# Patient Record
Sex: FEMALE | Race: WHITE | NOT HISPANIC OR LATINO | Employment: UNEMPLOYED | URBAN - METROPOLITAN AREA
[De-identification: names, ages, dates, MRNs, and addresses within clinical notes are randomized per-mention and may not be internally consistent; named-entity substitution may affect disease eponyms.]

---

## 2017-07-25 PROBLEM — D72.828 OTHER ELEVATED WHITE BLOOD CELL COUNT: Chronic | Status: ACTIVE | Noted: 2017-07-25

## 2018-10-26 ENCOUNTER — HOSPITAL ENCOUNTER (INPATIENT)
Facility: HOSPITAL | Age: 68
LOS: 18 days | Discharge: HOSPICE | DRG: 166 | End: 2018-11-14
Attending: EMERGENCY MEDICINE | Admitting: INTERNAL MEDICINE
Payer: COMMERCIAL

## 2018-10-26 DIAGNOSIS — D72.828 OTHER ELEVATED WHITE BLOOD CELL COUNT: Chronic | ICD-10-CM

## 2018-10-26 DIAGNOSIS — I10 ESSENTIAL HYPERTENSION: ICD-10-CM

## 2018-10-26 DIAGNOSIS — J90 PLEURAL EFFUSION: ICD-10-CM

## 2018-10-26 DIAGNOSIS — Z72.0 TOBACCO ABUSE: ICD-10-CM

## 2018-10-26 DIAGNOSIS — R06.2 WHEEZE: ICD-10-CM

## 2018-10-26 DIAGNOSIS — F41.1 GENERALIZED ANXIETY DISORDER: ICD-10-CM

## 2018-10-26 DIAGNOSIS — J18.9 PNEUMONIA OF LEFT LOWER LOBE DUE TO INFECTIOUS ORGANISM: Primary | ICD-10-CM

## 2018-10-26 DIAGNOSIS — E03.8 HYPOTHYROIDISM DUE TO HASHIMOTO'S THYROIDITIS: ICD-10-CM

## 2018-10-26 DIAGNOSIS — R93.89 ABNORMAL CXR: ICD-10-CM

## 2018-10-26 DIAGNOSIS — R06.02 SOB (SHORTNESS OF BREATH): ICD-10-CM

## 2018-10-26 DIAGNOSIS — E06.3 HYPOTHYROIDISM DUE TO HASHIMOTO'S THYROIDITIS: ICD-10-CM

## 2018-10-26 DIAGNOSIS — J96.01 ACUTE RESPIRATORY FAILURE WITH HYPOXIA: ICD-10-CM

## 2018-10-26 DIAGNOSIS — R63.0 ANOREXIA: ICD-10-CM

## 2018-10-26 LAB
BASOPHILS # BLD AUTO: 0 K/UL
BASOPHILS NFR BLD: 0 %
DIFFERENTIAL METHOD: ABNORMAL
EOSINOPHIL # BLD AUTO: 0 K/UL
EOSINOPHIL NFR BLD: 0.3 %
ERYTHROCYTE [DISTWIDTH] IN BLOOD BY AUTOMATED COUNT: 13.8 %
HCT VFR BLD AUTO: 51.7 %
HGB BLD-MCNC: 16.9 G/DL
LYMPHOCYTES # BLD AUTO: 2.1 K/UL
LYMPHOCYTES NFR BLD: 15 %
MCH RBC QN AUTO: 29.6 PG
MCHC RBC AUTO-ENTMCNC: 32.8 G/DL
MCV RBC AUTO: 90 FL
MONOCYTES # BLD AUTO: 0.6 K/UL
MONOCYTES NFR BLD: 4.4 %
NEUTROPHILS # BLD AUTO: 11.5 K/UL
NEUTROPHILS NFR BLD: 80.3 %
PLATELET # BLD AUTO: 347 K/UL
PMV BLD AUTO: 9 FL
RBC # BLD AUTO: 5.73 M/UL
WBC # BLD AUTO: 14.3 K/UL

## 2018-10-26 PROCEDURE — 25000242 PHARM REV CODE 250 ALT 637 W/ HCPCS: Performed by: EMERGENCY MEDICINE

## 2018-10-26 PROCEDURE — 27000221 HC OXYGEN, UP TO 24 HOURS

## 2018-10-26 PROCEDURE — 80053 COMPREHEN METABOLIC PANEL: CPT

## 2018-10-26 PROCEDURE — 96375 TX/PRO/DX INJ NEW DRUG ADDON: CPT

## 2018-10-26 PROCEDURE — 87040 BLOOD CULTURE FOR BACTERIA: CPT

## 2018-10-26 PROCEDURE — 93005 ELECTROCARDIOGRAM TRACING: CPT

## 2018-10-26 PROCEDURE — 85025 COMPLETE CBC W/AUTO DIFF WBC: CPT

## 2018-10-26 PROCEDURE — 93010 ELECTROCARDIOGRAM REPORT: CPT | Mod: ,,, | Performed by: INTERNAL MEDICINE

## 2018-10-26 PROCEDURE — 94640 AIRWAY INHALATION TREATMENT: CPT

## 2018-10-26 PROCEDURE — 36415 COLL VENOUS BLD VENIPUNCTURE: CPT

## 2018-10-26 PROCEDURE — 96365 THER/PROPH/DIAG IV INF INIT: CPT

## 2018-10-26 PROCEDURE — 99285 EMERGENCY DEPT VISIT HI MDM: CPT | Mod: 25

## 2018-10-26 PROCEDURE — 83605 ASSAY OF LACTIC ACID: CPT

## 2018-10-26 RX ORDER — IPRATROPIUM BROMIDE AND ALBUTEROL SULFATE 2.5; .5 MG/3ML; MG/3ML
3 SOLUTION RESPIRATORY (INHALATION)
Status: COMPLETED | OUTPATIENT
Start: 2018-10-26 | End: 2018-10-26

## 2018-10-26 RX ADMIN — IPRATROPIUM BROMIDE AND ALBUTEROL SULFATE 3 ML: .5; 3 SOLUTION RESPIRATORY (INHALATION) at 11:10

## 2018-10-27 PROBLEM — E03.8 HYPOTHYROIDISM DUE TO HASHIMOTO'S THYROIDITIS: Status: ACTIVE | Noted: 2018-10-27

## 2018-10-27 PROBLEM — F41.1 GENERALIZED ANXIETY DISORDER: Status: ACTIVE | Noted: 2018-10-27

## 2018-10-27 PROBLEM — I10 ESSENTIAL HYPERTENSION: Status: ACTIVE | Noted: 2018-10-27

## 2018-10-27 PROBLEM — E06.3 HYPOTHYROIDISM DUE TO HASHIMOTO'S THYROIDITIS: Status: ACTIVE | Noted: 2018-10-27

## 2018-10-27 PROBLEM — Z72.0 TOBACCO ABUSE: Status: ACTIVE | Noted: 2018-10-27

## 2018-10-27 PROBLEM — J96.01 ACUTE RESPIRATORY FAILURE WITH HYPOXIA: Status: ACTIVE | Noted: 2018-10-27

## 2018-10-27 PROBLEM — J18.9 PNEUMONIA OF LEFT LOWER LOBE DUE TO INFECTIOUS ORGANISM: Status: ACTIVE | Noted: 2018-10-27

## 2018-10-27 LAB
ALBUMIN SERPL BCP-MCNC: 3.1 G/DL
ALBUMIN SERPL BCP-MCNC: 3.8 G/DL
ALP SERPL-CCNC: 73 U/L
ALP SERPL-CCNC: 94 U/L
ALT SERPL W/O P-5'-P-CCNC: 23 U/L
ALT SERPL W/O P-5'-P-CCNC: 30 U/L
ANION GAP SERPL CALC-SCNC: 11 MMOL/L
ANION GAP SERPL CALC-SCNC: 13 MMOL/L
AST SERPL-CCNC: 23 U/L
AST SERPL-CCNC: 30 U/L
BACTERIA #/AREA URNS HPF: ABNORMAL /HPF
BASOPHILS # BLD AUTO: 0 K/UL
BASOPHILS NFR BLD: 0.2 %
BILIRUB SERPL-MCNC: 0.3 MG/DL
BILIRUB SERPL-MCNC: 0.5 MG/DL
BILIRUB UR QL STRIP: NEGATIVE
BUN SERPL-MCNC: 19 MG/DL
BUN SERPL-MCNC: 19 MG/DL
CALCIUM SERPL-MCNC: 10.2 MG/DL
CALCIUM SERPL-MCNC: 9.2 MG/DL
CHLORIDE SERPL-SCNC: 100 MMOL/L
CHLORIDE SERPL-SCNC: 96 MMOL/L
CLARITY UR: ABNORMAL
CO2 SERPL-SCNC: 25 MMOL/L
CO2 SERPL-SCNC: 30 MMOL/L
COLOR UR: YELLOW
CREAT SERPL-MCNC: 0.9 MG/DL
CREAT SERPL-MCNC: 1 MG/DL
DIFFERENTIAL METHOD: ABNORMAL
EOSINOPHIL # BLD AUTO: 0.1 K/UL
EOSINOPHIL NFR BLD: 0.6 %
ERYTHROCYTE [DISTWIDTH] IN BLOOD BY AUTOMATED COUNT: 13.6 %
EST. GFR  (AFRICAN AMERICAN): >60 ML/MIN/1.73 M^2
EST. GFR  (AFRICAN AMERICAN): >60 ML/MIN/1.73 M^2
EST. GFR  (NON AFRICAN AMERICAN): 57 ML/MIN/1.73 M^2
EST. GFR  (NON AFRICAN AMERICAN): >60 ML/MIN/1.73 M^2
ESTIMATED AVG GLUCOSE: 111 MG/DL
GLUCOSE SERPL-MCNC: 126 MG/DL
GLUCOSE SERPL-MCNC: 163 MG/DL
GLUCOSE UR QL STRIP: NEGATIVE
HBA1C MFR BLD HPLC: 5.5 %
HCT VFR BLD AUTO: 46 %
HGB BLD-MCNC: 15 G/DL
HGB UR QL STRIP: NEGATIVE
KETONES UR QL STRIP: NEGATIVE
LACTATE SERPL-SCNC: 1.7 MMOL/L
LACTATE SERPL-SCNC: 1.9 MMOL/L
LEUKOCYTE ESTERASE UR QL STRIP: ABNORMAL
LYMPHOCYTES # BLD AUTO: 2.4 K/UL
LYMPHOCYTES NFR BLD: 17.5 %
MAGNESIUM SERPL-MCNC: 1.7 MG/DL
MCH RBC QN AUTO: 29.4 PG
MCHC RBC AUTO-ENTMCNC: 32.5 G/DL
MCV RBC AUTO: 90 FL
MICROSCOPIC COMMENT: ABNORMAL
MONOCYTES # BLD AUTO: 0.9 K/UL
MONOCYTES NFR BLD: 6.5 %
NEUTROPHILS # BLD AUTO: 10.1 K/UL
NEUTROPHILS NFR BLD: 75.2 %
NITRITE UR QL STRIP: NEGATIVE
PH UR STRIP: 6 [PH] (ref 5–8)
PHOSPHATE SERPL-MCNC: 4.2 MG/DL
PLATELET # BLD AUTO: 305 K/UL
PMV BLD AUTO: 9 FL
POTASSIUM SERPL-SCNC: 3.7 MMOL/L
POTASSIUM SERPL-SCNC: 4.2 MMOL/L
PROCALCITONIN SERPL IA-MCNC: 0.03 NG/ML
PROT SERPL-MCNC: 6.3 G/DL
PROT SERPL-MCNC: 7.9 G/DL
PROT UR QL STRIP: NEGATIVE
RBC # BLD AUTO: 5.09 M/UL
RBC #/AREA URNS HPF: 1 /HPF (ref 0–4)
SODIUM SERPL-SCNC: 136 MMOL/L
SODIUM SERPL-SCNC: 139 MMOL/L
SP GR UR STRIP: 1.02 (ref 1–1.03)
SQUAMOUS #/AREA URNS HPF: 22 /HPF
URN SPEC COLLECT METH UR: ABNORMAL
UROBILINOGEN UR STRIP-ACNC: NEGATIVE EU/DL
WBC # BLD AUTO: 13.4 K/UL
WBC #/AREA URNS HPF: >100 /HPF (ref 0–5)

## 2018-10-27 PROCEDURE — S4991 NICOTINE PATCH NONLEGEND: HCPCS | Performed by: NURSE PRACTITIONER

## 2018-10-27 PROCEDURE — 63600175 PHARM REV CODE 636 W HCPCS: Performed by: NURSE PRACTITIONER

## 2018-10-27 PROCEDURE — 87088 URINE BACTERIA CULTURE: CPT

## 2018-10-27 PROCEDURE — 84100 ASSAY OF PHOSPHORUS: CPT

## 2018-10-27 PROCEDURE — 12000002 HC ACUTE/MED SURGE SEMI-PRIVATE ROOM

## 2018-10-27 PROCEDURE — 27000221 HC OXYGEN, UP TO 24 HOURS

## 2018-10-27 PROCEDURE — 36415 COLL VENOUS BLD VENIPUNCTURE: CPT

## 2018-10-27 PROCEDURE — 94640 AIRWAY INHALATION TREATMENT: CPT

## 2018-10-27 PROCEDURE — 25000003 PHARM REV CODE 250: Performed by: NURSE PRACTITIONER

## 2018-10-27 PROCEDURE — 25000242 PHARM REV CODE 250 ALT 637 W/ HCPCS: Performed by: INTERNAL MEDICINE

## 2018-10-27 PROCEDURE — 83735 ASSAY OF MAGNESIUM: CPT

## 2018-10-27 PROCEDURE — 25000003 PHARM REV CODE 250: Performed by: INTERNAL MEDICINE

## 2018-10-27 PROCEDURE — 83605 ASSAY OF LACTIC ACID: CPT

## 2018-10-27 PROCEDURE — 87086 URINE CULTURE/COLONY COUNT: CPT

## 2018-10-27 PROCEDURE — 85025 COMPLETE CBC W/AUTO DIFF WBC: CPT

## 2018-10-27 PROCEDURE — 83036 HEMOGLOBIN GLYCOSYLATED A1C: CPT

## 2018-10-27 PROCEDURE — 63600175 PHARM REV CODE 636 W HCPCS: Performed by: EMERGENCY MEDICINE

## 2018-10-27 PROCEDURE — 84145 PROCALCITONIN (PCT): CPT

## 2018-10-27 PROCEDURE — 80053 COMPREHEN METABOLIC PANEL: CPT

## 2018-10-27 PROCEDURE — 99223 1ST HOSP IP/OBS HIGH 75: CPT | Mod: ,,, | Performed by: INTERNAL MEDICINE

## 2018-10-27 PROCEDURE — 81000 URINALYSIS NONAUTO W/SCOPE: CPT

## 2018-10-27 RX ORDER — IPRATROPIUM BROMIDE AND ALBUTEROL SULFATE 2.5; .5 MG/3ML; MG/3ML
3 SOLUTION RESPIRATORY (INHALATION) EVERY 4 HOURS
Status: DISCONTINUED | OUTPATIENT
Start: 2018-10-27 | End: 2018-10-29

## 2018-10-27 RX ORDER — ENOXAPARIN SODIUM 100 MG/ML
40 INJECTION SUBCUTANEOUS EVERY 24 HOURS
Status: DISCONTINUED | OUTPATIENT
Start: 2018-10-27 | End: 2018-10-29

## 2018-10-27 RX ORDER — CEFTRIAXONE 1 G/50ML
1 INJECTION, SOLUTION INTRAVENOUS
Status: DISCONTINUED | OUTPATIENT
Start: 2018-10-28 | End: 2018-11-01

## 2018-10-27 RX ORDER — LEVOTHYROXINE SODIUM 50 UG/1
50 TABLET ORAL
Status: DISCONTINUED | OUTPATIENT
Start: 2018-10-27 | End: 2018-11-14 | Stop reason: HOSPADM

## 2018-10-27 RX ORDER — SODIUM CHLORIDE 0.9 % (FLUSH) 0.9 %
5 SYRINGE (ML) INJECTION
Status: DISCONTINUED | OUTPATIENT
Start: 2018-10-27 | End: 2018-11-14 | Stop reason: HOSPADM

## 2018-10-27 RX ORDER — IBUPROFEN 200 MG
24 TABLET ORAL
Status: DISCONTINUED | OUTPATIENT
Start: 2018-10-27 | End: 2018-11-14 | Stop reason: HOSPADM

## 2018-10-27 RX ORDER — PROPRANOLOL HYDROCHLORIDE 80 MG/1
80 TABLET ORAL 2 TIMES DAILY
Status: DISCONTINUED | OUTPATIENT
Start: 2018-10-27 | End: 2018-10-29

## 2018-10-27 RX ORDER — ONDANSETRON 2 MG/ML
4 INJECTION INTRAMUSCULAR; INTRAVENOUS EVERY 6 HOURS PRN
Status: DISCONTINUED | OUTPATIENT
Start: 2018-10-27 | End: 2018-11-14 | Stop reason: HOSPADM

## 2018-10-27 RX ORDER — GLUCAGON 1 MG
1 KIT INJECTION
Status: DISCONTINUED | OUTPATIENT
Start: 2018-10-27 | End: 2018-11-14 | Stop reason: HOSPADM

## 2018-10-27 RX ORDER — PANTOPRAZOLE SODIUM 40 MG/1
40 TABLET, DELAYED RELEASE ORAL DAILY
Status: DISCONTINUED | OUTPATIENT
Start: 2018-10-27 | End: 2018-11-14 | Stop reason: HOSPADM

## 2018-10-27 RX ORDER — IBUPROFEN 200 MG
1 TABLET ORAL DAILY
Status: DISCONTINUED | OUTPATIENT
Start: 2018-10-27 | End: 2018-11-14 | Stop reason: HOSPADM

## 2018-10-27 RX ORDER — IBUPROFEN 200 MG
16 TABLET ORAL
Status: DISCONTINUED | OUTPATIENT
Start: 2018-10-27 | End: 2018-11-14 | Stop reason: HOSPADM

## 2018-10-27 RX ORDER — AMITRIPTYLINE HYDROCHLORIDE 25 MG/1
25 TABLET, FILM COATED ORAL NIGHTLY
Status: DISCONTINUED | OUTPATIENT
Start: 2018-10-27 | End: 2018-11-14 | Stop reason: HOSPADM

## 2018-10-27 RX ORDER — SODIUM CHLORIDE 9 MG/ML
INJECTION, SOLUTION INTRAVENOUS CONTINUOUS
Status: DISCONTINUED | OUTPATIENT
Start: 2018-10-27 | End: 2018-10-27

## 2018-10-27 RX ORDER — ESCITALOPRAM OXALATE 10 MG/1
20 TABLET ORAL DAILY
Status: DISCONTINUED | OUTPATIENT
Start: 2018-10-27 | End: 2018-11-14 | Stop reason: HOSPADM

## 2018-10-27 RX ORDER — ACETAMINOPHEN 325 MG/1
650 TABLET ORAL EVERY 4 HOURS PRN
Status: DISCONTINUED | OUTPATIENT
Start: 2018-10-27 | End: 2018-11-14 | Stop reason: HOSPADM

## 2018-10-27 RX ORDER — IPRATROPIUM BROMIDE AND ALBUTEROL SULFATE 2.5; .5 MG/3ML; MG/3ML
3 SOLUTION RESPIRATORY (INHALATION) EVERY 4 HOURS
Status: DISCONTINUED | OUTPATIENT
Start: 2018-10-27 | End: 2018-10-27 | Stop reason: SDUPTHER

## 2018-10-27 RX ADMIN — IPRATROPIUM BROMIDE AND ALBUTEROL SULFATE 3 ML: .5; 3 SOLUTION RESPIRATORY (INHALATION) at 08:10

## 2018-10-27 RX ADMIN — PROPRANOLOL HYDROCHLORIDE 80 MG: 80 TABLET ORAL at 09:10

## 2018-10-27 RX ADMIN — AMITRIPTYLINE HYDROCHLORIDE 25 MG: 25 TABLET, FILM COATED ORAL at 10:10

## 2018-10-27 RX ADMIN — PANTOPRAZOLE SODIUM 40 MG: 40 TABLET, DELAYED RELEASE ORAL at 09:10

## 2018-10-27 RX ADMIN — AZITHROMYCIN MONOHYDRATE 500 MG: 500 INJECTION, POWDER, LYOPHILIZED, FOR SOLUTION INTRAVENOUS at 01:10

## 2018-10-27 RX ADMIN — NICOTINE 1 PATCH: 14 PATCH, EXTENDED RELEASE TRANSDERMAL at 09:10

## 2018-10-27 RX ADMIN — ONDANSETRON HYDROCHLORIDE 4 MG: 2 INJECTION INTRAMUSCULAR; INTRAVENOUS at 11:10

## 2018-10-27 RX ADMIN — IPRATROPIUM BROMIDE AND ALBUTEROL SULFATE 3 ML: .5; 3 SOLUTION RESPIRATORY (INHALATION) at 03:10

## 2018-10-27 RX ADMIN — PROPRANOLOL HYDROCHLORIDE 80 MG: 80 TABLET ORAL at 10:10

## 2018-10-27 RX ADMIN — ACETAMINOPHEN 650 MG: 325 TABLET, FILM COATED ORAL at 04:10

## 2018-10-27 RX ADMIN — IPRATROPIUM BROMIDE AND ALBUTEROL SULFATE 3 ML: .5; 3 SOLUTION RESPIRATORY (INHALATION) at 04:10

## 2018-10-27 RX ADMIN — CEFTRIAXONE 1 G: 1 INJECTION, SOLUTION INTRAVENOUS at 01:10

## 2018-10-27 RX ADMIN — LEVOTHYROXINE SODIUM 50 MCG: 50 TABLET ORAL at 08:10

## 2018-10-27 RX ADMIN — IPRATROPIUM BROMIDE AND ALBUTEROL SULFATE 3 ML: .5; 3 SOLUTION RESPIRATORY (INHALATION) at 12:10

## 2018-10-27 RX ADMIN — ENOXAPARIN SODIUM 40 MG: 100 INJECTION SUBCUTANEOUS at 04:10

## 2018-10-27 RX ADMIN — SODIUM CHLORIDE: 0.9 INJECTION, SOLUTION INTRAVENOUS at 02:10

## 2018-10-27 RX ADMIN — ESCITALOPRAM 20 MG: 10 TABLET, FILM COATED ORAL at 09:10

## 2018-10-27 NOTE — ASSESSMENT & PLAN NOTE
Presented with increased SOB x 3 weeks.  Oxygen saturation noted at 89%, recovered well with supplemental oxygen via NC.  Likely due to pneumonia of LLL.  Pt is a daily smoker.  Likely has COPD, although she is not currently treated with chronic meds.  Continue supplemental oxygen as needed.  Nebulizer treatments   Monitor and treat as clinically indicated.

## 2018-10-27 NOTE — ED PROVIDER NOTES
"Encounter Date: 10/26/2018    SCRIBE #1 NOTE: I, Antonino Wilson, am scribing for, and in the presence of, Dr. Huff.       History     Chief Complaint   Patient presents with    Shortness of Breath     worse x 3 days     Cough     x 3 days     Fatigue     feeling bad since pneumonia shot 1 month ago       Time seen by provider: 10:55 PM on 10/26/2018    Bertha Kelly is a 68 y.o. female who presents to the ED with complaints of difficulty breathing that began approximately one month ago and has been worsening in severity. Per patients spouse, she received a pneumonia shot one month ago and two days later she started getting sick. He states that it is now three weeks later and she is becoming weaker and weaker, began losing her voice two weeks ago, and started coughing up phlegm this week. Per patients daughter-in-law, the patient stays in the bed constantly and gets episodes of "the chills." The patients daughter adds that this week the she started having hallucinations and has been talking all night long. The patient denies any vomiting or pain. She adds that she has been having diarrhea, but contributes it to a hot dog at [employer]. The patient denies any other symptoms at this time. Patient has drug allergies to codeine and penicillins.       The history is provided by the patient, the spouse and a relative.     Review of patient's allergies indicates:   Allergen Reactions    Codeine Hives    Penicillins Hives     Past Medical History:   Diagnosis Date    Hypertension     Thyroid disease      Past Surgical History:   Procedure Laterality Date    EYE SURGERY      childhood     Family History   Problem Relation Age of Onset    Hypertension Father      Social History     Tobacco Use    Smoking status: Current Every Day Smoker     Packs/day: 0.50     Types: Cigarettes    Smokeless tobacco: Never Used   Substance Use Topics    Alcohol use: No     Frequency: Never    Drug use: No     Review of Systems "   Constitutional: Positive for fatigue. Negative for fever.   HENT: Positive for voice change. Negative for sore throat.    Respiratory: Positive for cough and shortness of breath.    Cardiovascular: Negative for chest pain.   Gastrointestinal: Positive for diarrhea. Negative for abdominal pain, nausea and vomiting.   Genitourinary: Negative for dysuria.   Musculoskeletal: Negative for back pain.   Skin: Negative for rash.   Neurological: Positive for weakness.   Hematological: Does not bruise/bleed easily.   Psychiatric/Behavioral: Positive for hallucinations.       Physical Exam     Initial Vitals [10/26/18 2105]   BP Pulse Resp Temp SpO2   (!) 151/72 84 20 97.6 °F (36.4 °C) (!) 93 %      MAP       --         Physical Exam    Nursing note and vitals reviewed.  Constitutional: She appears well-developed and well-nourished. She is not diaphoretic. No distress.   Hoarse voice noted.    HENT:   Head: Normocephalic and atraumatic.   Eyes: EOM are normal. Pupils are equal, round, and reactive to light.   Neck: Normal range of motion. Neck supple.   Cardiovascular: Normal rate, regular rhythm, normal heart sounds and intact distal pulses. Exam reveals no gallop and no friction rub.    No murmur heard.  Pulmonary/Chest: Tachypnea noted. No respiratory distress. She has decreased breath sounds. She has wheezes. She has no rhonchi. She has no rales.   Wheezes noted. Diminished lung sounds.   Abdominal: Soft. Bowel sounds are normal. There is no tenderness. There is no rebound and no guarding.   Musculoskeletal: Normal range of motion.   Neurological: She is alert and oriented to person, place, and time.   Skin: Skin is warm and dry. There is pallor.   Psychiatric: She has a normal mood and affect. Her behavior is normal. Judgment and thought content normal.         ED Course   Procedures  Labs Reviewed   CBC W/ AUTO DIFFERENTIAL - Abnormal; Notable for the following components:       Result Value    WBC 14.30 (*)     RBC  5.73 (*)     Hemoglobin 16.9 (*)     Hematocrit 51.7 (*)     MPV 9.0 (*)     Gran # (ANC) 11.5 (*)     Gran% 80.3 (*)     Lymph% 15.0 (*)     All other components within normal limits   COMPREHENSIVE METABOLIC PANEL - Abnormal; Notable for the following components:    CO2 30 (*)     Glucose 126 (*)     eGFR if non  57 (*)     All other components within normal limits   CULTURE, BLOOD   CULTURE, BLOOD   LACTIC ACID, PLASMA   URINALYSIS, REFLEX TO URINE CULTURE          Imaging Results          X-Ray Chest AP Portable (In process)                  Medical Decision Making:   History:   Old Medical Records: I decided to obtain old medical records.  Initial Assessment:   68-year-old female presented with a chief complaint of shortness of breath and a cough.  Differential Diagnosis:   Initial differential diagnosis included but not limited to Pneumonia, sepsis, COPD, and bronchitis.   Clinical Tests:   Lab Tests: Ordered and Reviewed  Radiological Study: Ordered and Reviewed  Medical Tests: Ordered and Reviewed  ED Management:  The patient was emergently evaluated in the emergency department, her evaluation was significant for an elderly female with abnormal lung sounds. The patient was aggressively treated with multiple respiratory nebulizer treatment, with improvement of symptoms.  The patient's chest x-ray was concerning for a left-sided pneumonia and pleural effusion per my independent interpretation.  The patient's labs were significant for an elevated white blood cell count and normal lactic acid level.  The patient was started on IV antibiotics and  because the patient was hypoxic, I believe she will require further IV antibiotic therapy.  I have discussed the case with the APC on call for the hospitalist service.  She has accepted the patient for admission.            Scribe Attestation:   Scribe #1: I performed the above scribed service and the documentation accurately describes the services I  performed. I attest to the accuracy of the note.           I, Dr. Betito Huff, personally performed the services described in this documentation. All medical record entries made by the scribe were at my direction and in my presence.  I have reviewed the chart and agree that the record reflects my personal performance and is accurate and complete. Betito Huff MD.  6:11 AM 10/27/2018       Clinical Impression:   The primary encounter diagnosis was Pneumonia of left lower lobe due to infectious organism. A diagnosis of SOB (shortness of breath) was also pertinent to this visit.                             Betito Huff MD  10/27/18 0613

## 2018-10-27 NOTE — PLAN OF CARE
10/27/18 0335   Patient Assessment/Suction   Level of Consciousness (AVPU) alert   Respiratory Effort Unlabored   Expansion/Accessory Muscles/Retractions expansion symmetric   All Lung Fields Breath Sounds diminished   Rhythm/Pattern, Respiratory tachypnea   Cough Frequency infrequent   Cough Type nonproductive   PRE-TX-O2-ETCO2   O2 Device (Oxygen Therapy) nasal cannula   Flow (L/min) 2   SpO2 (!) 94 %   Pulse 88   Resp 20   Aerosol Therapy   $ Aerosol Therapy Charges Aerosol Treatment   Respiratory Treatment Status given   SVN/Inhaler Treatment Route mask   Position During Treatment Sitting in bed   Patient Tolerance good   Post-Treatment   Post-treatment Heart Rate (beats/min) 88   Post-treatment Resp Rate (breaths/min) 20   All Fields Breath Sounds unchanged

## 2018-10-27 NOTE — SUBJECTIVE & OBJECTIVE
Past Medical History:   Diagnosis Date    Hypertension     Thyroid disease        Past Surgical History:   Procedure Laterality Date    EYE SURGERY      childhood       Review of patient's allergies indicates:   Allergen Reactions    Codeine Hives    Penicillins Hives       No current facility-administered medications on file prior to encounter.      No current outpatient medications on file prior to encounter.     Family History     Problem Relation (Age of Onset)    Hypertension Father        Tobacco Use    Smoking status: Current Every Day Smoker     Packs/day: 0.50     Types: Cigarettes    Smokeless tobacco: Never Used   Substance and Sexual Activity    Alcohol use: No     Frequency: Never    Drug use: No    Sexual activity: Not on file     Review of Systems   Constitutional: Positive for appetite change, chills, fatigue and unexpected weight change. Negative for fever.   HENT: Positive for congestion and sore throat.    Eyes: Negative for photophobia and visual disturbance.   Respiratory: Positive for cough and shortness of breath. Negative for wheezing.    Cardiovascular: Negative for chest pain and palpitations.   Gastrointestinal: Positive for diarrhea. Negative for abdominal pain, nausea and vomiting.   Endocrine: Negative for polyphagia and polyuria.   Genitourinary: Negative for difficulty urinating and dysuria.   Musculoskeletal: Negative for gait problem and joint swelling.   Skin: Negative for rash and wound.   Neurological: Negative for dizziness and light-headedness.   Hematological: Negative for adenopathy.   Psychiatric/Behavioral: Positive for hallucinations. Negative for agitation.   All other systems reviewed and are negative.    Objective:     Vital Signs (Most Recent):  Temp: 97.6 °F (36.4 °C) (10/26/18 2105)  Pulse: 71 (10/27/18 0131)  Resp: 20 (10/26/18 2322)  BP: (!) 134/59 (10/27/18 0131)  SpO2: 99 % (10/27/18 0131) Vital Signs (24h Range):  Temp:  [97.6 °F (36.4 °C)] 97.6 °F  (36.4 °C)  Pulse:  [71-84] 71  Resp:  [20-22] 20  SpO2:  [89 %-100 %] 99 %  BP: (110-184)/(59-77) 134/59     Weight: 54.4 kg (120 lb)  Body mass index is 21.26 kg/m².    Physical Exam   Constitutional: She is oriented to person, place, and time. She appears well-developed and well-nourished.   HENT:   Head: Normocephalic and atraumatic.   Eyes: Conjunctivae and EOM are normal. Pupils are equal, round, and reactive to light.   Neck: Normal range of motion. Neck supple. No JVD present.   Cardiovascular: Normal rate, regular rhythm, normal heart sounds and intact distal pulses.   Pulmonary/Chest: Tachypnea noted. She has decreased breath sounds in the left lower field.   Abdominal: Soft. Bowel sounds are normal. She exhibits no distension. There is no tenderness.   Genitourinary:   Genitourinary Comments: deferred   Musculoskeletal: Normal range of motion. She exhibits no edema.   Neurological: She is alert and oriented to person, place, and time.   Skin: Skin is warm and dry. Capillary refill takes 2 to 3 seconds.   Psychiatric: She has a normal mood and affect. Her behavior is normal. Judgment and thought content normal.   Vitals reviewed.        CRANIAL NERVES     CN III, IV, VI   Pupils are equal, round, and reactive to light.  Extraocular motions are normal.        Significant Labs:   CBC:   Recent Labs   Lab 10/26/18  2348   WBC 14.30*   HGB 16.9*   HCT 51.7*        CMP:   Recent Labs   Lab 10/26/18  2348      K 4.2   CL 96   CO2 30*   *   BUN 19   CREATININE 1.0   CALCIUM 10.2   PROT 7.9   ALBUMIN 3.8   BILITOT 0.5   ALKPHOS 94   AST 30   ALT 30   ANIONGAP 13   EGFRNONAA 57*     Lactic Acid:   Recent Labs   Lab 10/26/18  2348   LACTATE 1.7       Significant Imaging: CXR: I have reviewed all pertinent results/findings within the past 24 hours and my personal findings are:  LLL infiltrate

## 2018-10-27 NOTE — HPI
Bertha Kelly is a 67 y/o female with a PMHx of HTN and thyroid disease who presented to the ED today with c/o SOB which began 3 weeks ago and has progressively gotten worse.  Pt states that she becomes SOB with activity.  She reports that she received a pneumonia vaccine at her doctor's office about a month ago and began having some chest congestion and cough a few days later.  She is a current smoker of about 1/2 pack daily.  Reports increased fatigue and chills, but denies fever, chest pain, N/V, and dysuria.  Reports weight loss of a few pounds in the past 3 weeks due to poor appetite, but states that prior to this illness, weight was stable and she had a very good appetite.  Pt's CXR reveals a LLL pneumonia and was noted to be mildly hypoxic initially requiring supplemental oxygen.  She will be admitted to the service of hospital medicine for further treatment.

## 2018-10-27 NOTE — PLAN OF CARE
Discharge assessment completed by KARTHIK Nunez...WENDI Lund       10/27/18 1285   Discharge Assessment   Assessment Type Discharge Planning Assessment

## 2018-10-27 NOTE — PROGRESS NOTES
"Dr. Sagastume notified that patient reported that " I have not urinated since I've been here." Patient was encouraged to try to void. Patient used  ml output. Patient ambulated around unit 2 times. Patient bladder scanned 202 ml. Dr. Sagastume notified of scan results. No further orders at this time.   "

## 2018-10-27 NOTE — ASSESSMENT & PLAN NOTE
Presented with increased SOB x 3 weeks.  Oxygen saturation noted at 89%, recovered well with supplemental oxygen via NC.  Likely due to pneumonia of LLL.  Pt is a daily smoker.  Likely has COPD, although she is not currently treated with chronic meds.  Continue supplemental oxygen as needed.  Nebulizer treatments   Monitor and treat as clinically indicated.  Imaging as outpatient to exclude underlying neoplasm.

## 2018-10-27 NOTE — ASSESSMENT & PLAN NOTE
Presents with SOB; LLL pneumonia identified on CXR.    Initiate treatment regimen for CAP, which includes a beta lactam and macrolide.   IV Rocephin and IV azithromycin initiated in ED-continue  Nebulizer treatments

## 2018-10-27 NOTE — H&P
[facility] Hospital Medicine  History & Physical    Patient Name: Bertha Kelly  MRN: [id number]  Admission Date: 10/26/2018  Attending Physician: Vicki Suarez MD   Primary Care Provider: James Colon MD         Patient information was obtained from patient, spouse/SO and ER records.     Subjective:     Principal Problem:Acute respiratory failure with hypoxia    Chief Complaint:   Chief Complaint   Patient presents with    Shortness of Breath     worse x 3 days     Cough     x 3 days     Fatigue     feeling bad since pneumonia shot 1 month ago        HPI: Bertha Kelly is a 67 y/o female with a PMHx of HTN and thyroid disease who presented to the ED today with c/o SOB which began 3 weeks ago and has progressively gotten worse.  Pt states that she becomes SOB with activity.  She reports that she received a pneumonia vaccine at her doctor's office about a month ago and began having some chest congestion and cough a few days later.  She is a current smoker of about 1/2 pack daily.  Reports increased fatigue and chills, but denies fever, chest pain, N/V, and dysuria.  Reports weight loss of a few pounds in the past 3 weeks due to poor appetite, but states that prior to this illness, weight was stable and she had a very good appetite.  Pt's CXR reveals a LLL pneumonia and was noted to be mildly hypoxic initially requiring supplemental oxygen.  She will be admitted to the service of hospital medicine for further treatment.    Past Medical History:   Diagnosis Date    Hypertension     Thyroid disease        Past Surgical History:   Procedure Laterality Date    EYE SURGERY      childhood       Review of patient's allergies indicates:   Allergen Reactions    Codeine Hives    Penicillins Hives       No current facility-administered medications on file prior to encounter.      No current outpatient medications on file prior to encounter.     Family History     Problem Relation (Age of Onset)     Hypertension Father        Tobacco Use    Smoking status: Current Every Day Smoker     Packs/day: 0.50     Types: Cigarettes    Smokeless tobacco: Never Used   Substance and Sexual Activity    Alcohol use: No     Frequency: Never    Drug use: No    Sexual activity: Not on file     Review of Systems   Constitutional: Positive for appetite change, chills, fatigue and unexpected weight change. Negative for fever.   HENT: Positive for congestion and sore throat.    Eyes: Negative for photophobia and visual disturbance.   Respiratory: Positive for cough and shortness of breath. Negative for wheezing.    Cardiovascular: Negative for chest pain and palpitations.   Gastrointestinal: Positive for diarrhea. Negative for abdominal pain, nausea and vomiting.   Endocrine: Negative for polyphagia and polyuria.   Genitourinary: Negative for difficulty urinating and dysuria.   Musculoskeletal: Negative for gait problem and joint swelling.   Skin: Negative for rash and wound.   Neurological: Negative for dizziness and light-headedness.   Hematological: Negative for adenopathy.   Psychiatric/Behavioral: Positive for hallucinations. Negative for agitation.   All other systems reviewed and are negative.    Objective:     Vital Signs (Most Recent):  Temp: 97.6 °F (36.4 °C) (10/26/18 2105)  Pulse: 71 (10/27/18 0131)  Resp: 20 (10/26/18 2322)  BP: (!) 134/59 (10/27/18 0131)  SpO2: 99 % (10/27/18 0131) Vital Signs (24h Range):  Temp:  [97.6 °F (36.4 °C)] 97.6 °F (36.4 °C)  Pulse:  [71-84] 71  Resp:  [20-22] 20  SpO2:  [89 %-100 %] 99 %  BP: (110-184)/(59-77) 134/59     Weight: 54.4 kg (120 lb)  Body mass index is 21.26 kg/m².    Physical Exam   Constitutional: She is oriented to person, place, and time. She appears well-developed and well-nourished.   HENT:   Head: Normocephalic and atraumatic.   Eyes: Conjunctivae and EOM are normal. Pupils are equal, round, and reactive to light.   Neck: Normal range of motion. Neck supple. No  JVD present.   Cardiovascular: Normal rate, regular rhythm, normal heart sounds and intact distal pulses.   Pulmonary/Chest: Tachypnea noted. She has decreased breath sounds in the left lower field.   Abdominal: Soft. Bowel sounds are normal. She exhibits no distension. There is no tenderness.   Genitourinary:   Genitourinary Comments: deferred   Musculoskeletal: Normal range of motion. She exhibits no edema.   Neurological: She is alert and oriented to person, place, and time.   Skin: Skin is warm and dry. Capillary refill takes 2 to 3 seconds.   Psychiatric: She has a normal mood and affect. Her behavior is normal. Judgment and thought content normal.   Vitals reviewed.        CRANIAL NERVES     CN III, IV, VI   Pupils are equal, round, and reactive to light.  Extraocular motions are normal.        Significant Labs:   CBC:   Recent Labs   Lab 10/26/18  2348   WBC 14.30*   HGB 16.9*   HCT 51.7*        CMP:   Recent Labs   Lab 10/26/18  2348      K 4.2   CL 96   CO2 30*   *   BUN 19   CREATININE 1.0   CALCIUM 10.2   PROT 7.9   ALBUMIN 3.8   BILITOT 0.5   ALKPHOS 94   AST 30   ALT 30   ANIONGAP 13   EGFRNONAA 57*     Lactic Acid:   Recent Labs   Lab 10/26/18  2348   LACTATE 1.7       Significant Imaging: CXR: I have reviewed all pertinent results/findings within the past 24 hours and my personal findings are:  LLL infiltrate    Assessment/Plan:     * Acute respiratory failure with hypoxia    Presented with increased SOB x 3 weeks.  Oxygen saturation noted at 89%, recovered well with supplemental oxygen via NC.  Likely due to pneumonia of LLL.  Pt is a daily smoker.  Likely has COPD, although she is not currently treated with chronic meds.  Continue supplemental oxygen as needed.  Nebulizer treatments   Monitor and treat as clinically indicated.     Pneumonia of left lower lobe due to infectious organism    Presents with SOB; LLL pneumonia identified on CXR.    Initiate treatment regimen for CAP,  which includes a beta lactam and macrolide.   IV Rocephin and IV azithromycin initiated in ED-continue  Nebulizer treatments  Collect sputum sample.  Procalcitonin  Adjust treatment as per clinical response.         Tobacco abuse    Health hazards associated with cigarette smoking were reviewed with patient and cessation was encouraged. Nicotine replacement and counseling options were discussed.  Spent 3 minutes counseling on cessation, patient not ready to quit-- nicotine patch ordered.         Generalized anxiety disorder    Chronic; currently controlled.  Continue home regimen.       Essential hypertension    Chronic; stable.  Continue chronic meds.     Hypothyroidism due to Hashimoto's thyroiditis    Chronic; pt states controlled.  Continue levothyroxine.         VTE Risk Mitigation (From admission, onward)        Ordered     enoxaparin injection 40 mg  Daily      10/27/18 0207     IP VTE HIGH RISK PATIENT  Once      10/27/18 0207     Place EVELYNE hose  Until discontinued      10/27/18 0207     Place sequential compression device  Until discontinued      10/27/18 0207      Time spent seeing patient( greater than 1/2 spent in direct contact) : 50 minutes       CHUNG Morales  Department of Hospital Medicine   [facility]

## 2018-10-27 NOTE — PLAN OF CARE
SSC met with patient at bedside to complete discharge planning assessment.  Patient verified all demographic information on facesheet is correct.  Patient verified PCP is Dr. James Colon.  Patient verified primary health insurance is [employer].  Patient states she has not been admitted within the last 30 days.  Patient is independent and lives with spouse.  Patient do not drive.  Family and friends drive patient.  Patient with no home health or DME.  Patient with no POA or living will.  Patient uses any [facility] in [place], [place].  Patient is not dialysis nor on medication coumadin.  Patient has no financial issues at this time.              Discharge Planning Assessment    Patient Identification  Name: Bertha Kelly  Age: 68 y.o.   Gender: female.  Admitting Diagnosis: Acute respiratory failure with hypoxia  PCP: James Colon MD   PCP Address: [address] [place] [address] [address] [facility]  PCP Phone Number: [phone]     Telephone Contacts  Extended Emergency Contact Information  Primary Emergency Contact: RobinLuciay  Address: [address]  Home Phone: [phone]  Relation: Spouse    Alert/Orientation: yes xs 4  If patient is unable to sign for self, who is handling affairs?: spouse, Nikolas Kelly  Is this person the HPOA? no  Does patient have a living will? no     Arrived from: home  Lives with: spouse  Support System: family  Who do you want involved in your discharge planning? spouse  Are they available to help you at discharge? yes  Prior Level of Functioning: independent  Were you able to care for yourself at home? yes  Who assists with Medications, Meal Prep, Housekeeping, Laundry, Shopping, MD Appts?: self, family    Have you been in the hospital in the last 30 days?: no  If so, were you admitted for the same reason? n/a  If not, why were you in the hospital? n/a    Services received prior to admit:  none  DME used at home:  none    Capacity for self-care: independent  Services patient will need at discharge: none at this time  DME pt will need at discharge: none    Ph[facility] often [facility] [place] [place]  Able to afford meds?: yes    How do you get to your MD appointments / Do you have transportation or depend on someone else:  Family and friends  Are you taking Coumadin at home?:   no If so, who monitors your INR: n/a  Are you on Dialysis?:  no If so where do you attend dialysis: n/a    Community Resources & Referrals Needed: none      Discharge plan 1:  Home with family  Discharge plan 2:

## 2018-10-27 NOTE — SUBJECTIVE & OBJECTIVE
Interval History: Patient seen and examined. No acute events. Feels better this morning.     Review of Systems   Constitutional: Positive for fatigue. Negative for activity change, appetite change and fever.   HENT: Negative.    Eyes: Negative.    Respiratory: Positive for shortness of breath. Negative for chest tightness and wheezing.    Cardiovascular: Negative for chest pain, palpitations and leg swelling.   Gastrointestinal: Negative for abdominal distention, abdominal pain, blood in stool, diarrhea and vomiting.   Genitourinary: Negative for dysuria and hematuria.   Neurological: Negative for headaches.   Hematological: Negative for adenopathy.   Psychiatric/Behavioral: Negative for confusion.     Objective:     Vital Signs (Most Recent):  Temp: 98.3 °F (36.8 °C) (10/27/18 0213)  Pulse: 64 (10/27/18 0932)  Resp: 19 (10/27/18 0855)  BP: (!) 117/59 (10/27/18 0932)  SpO2: 97 % (10/27/18 0855) Vital Signs (24h Range):  Temp:  [97.6 °F (36.4 °C)-98.3 °F (36.8 °C)] 98.3 °F (36.8 °C)  Pulse:  [64-88] 64  Resp:  [18-22] 19  SpO2:  [89 %-100 %] 97 %  BP: (110-184)/(59-77) 117/59     Weight: 54.4 kg (120 lb)  Body mass index is 21.26 kg/m².  No intake or output data in the 24 hours ending 10/27/18 1020   Physical Exam   Constitutional: She is oriented to person, place, and time. She appears well-developed and well-nourished. No distress.   HENT:   Head: Normocephalic and atraumatic.   Eyes: Pupils are equal, round, and reactive to light.   Neck: Neck supple. No thyromegaly present.   Cardiovascular: Normal rate and regular rhythm. Exam reveals no gallop and no friction rub.   No murmur heard.  Pulmonary/Chest: Effort normal and breath sounds normal. No respiratory distress. She has no wheezes.   Abdominal: Soft. Bowel sounds are normal. She exhibits no distension. There is no tenderness. There is no guarding.   Musculoskeletal: Normal range of motion. She exhibits no edema.   Neurological: She is alert and oriented to  person, place, and time.   Skin: Skin is warm and dry. No erythema.   Psychiatric: She has a normal mood and affect.       Significant Labs:   CBC:   Recent Labs   Lab 10/26/18  2348 10/27/18  0502   WBC 14.30* 13.40*   HGB 16.9* 15.0   HCT 51.7* 46.0    305       Significant Imaging: I have reviewed all pertinent imaging results/findings within the past 24 hours.

## 2018-10-27 NOTE — PLAN OF CARE
Problem: Patient Care Overview  Goal: Plan of Care Review  Outcome: Ongoing (interventions implemented as appropriate)  Pt on 2L NC with 97% sats and Q4 duoneb treatments.

## 2018-10-27 NOTE — ASSESSMENT & PLAN NOTE
Presents with SOB; LLL pneumonia identified on CXR.    Initiate treatment regimen for CAP, which includes a beta lactam and macrolide.   IV Rocephin and IV azithromycin initiated in ED-continue  Nebulizer treatments  Collect sputum sample.  Procalcitonin  Adjust treatment as per clinical response.

## 2018-10-27 NOTE — PROGRESS NOTES
[facility] Hospital Medicine  Progress Note    Patient Name: Bertha Kelly  MRN: [id number]  Patient Class: IP- Inpatient   Admission Date: 10/26/2018  Length of Stay: 0 days  Attending Physician: Vicki Suarez MD  Primary Care Provider: James Colon MD        Subjective:     Principal Problem:Acute respiratory failure with hypoxia    HPI:  Bertha Kelly is a 67 y/o female with a PMHx of HTN and thyroid disease who presented to the ED today with c/o SOB which began 3 weeks ago and has progressively gotten worse.  Pt states that she becomes SOB with activity.  She reports that she received a pneumonia vaccine at her doctor's office about a month ago and began having some chest congestion and cough a few days later.  She is a current smoker of about 1/2 pack daily.  Reports increased fatigue and chills, but denies fever, chest pain, N/V, and dysuria.  Reports weight loss of a few pounds in the past 3 weeks due to poor appetite, but states that prior to this illness, weight was stable and she had a very good appetite.  Pt's CXR reveals a LLL pneumonia and was noted to be mildly hypoxic initially requiring supplemental oxygen.  She will be admitted to the service of hospital medicine for further treatment.    Hospital Course:  No notes on file    Interval History: Patient seen and examined. No acute events. Feels better this morning.     Review of Systems   Constitutional: Positive for fatigue. Negative for activity change, appetite change and fever.   HENT: Negative.    Eyes: Negative.    Respiratory: Positive for shortness of breath. Negative for chest tightness and wheezing.    Cardiovascular: Negative for chest pain, palpitations and leg swelling.   Gastrointestinal: Negative for abdominal distention, abdominal pain, blood in stool, diarrhea and vomiting.   Genitourinary: Negative for dysuria and hematuria.   Neurological: Negative for headaches.   Hematological: Negative for adenopathy.    Psychiatric/Behavioral: Negative for confusion.     Objective:     Vital Signs (Most Recent):  Temp: 98.3 °F (36.8 °C) (10/27/18 0213)  Pulse: 64 (10/27/18 0932)  Resp: 19 (10/27/18 0855)  BP: (!) 117/59 (10/27/18 0932)  SpO2: 97 % (10/27/18 0855) Vital Signs (24h Range):  Temp:  [97.6 °F (36.4 °C)-98.3 °F (36.8 °C)] 98.3 °F (36.8 °C)  Pulse:  [64-88] 64  Resp:  [18-22] 19  SpO2:  [89 %-100 %] 97 %  BP: (110-184)/(59-77) 117/59     Weight: 54.4 kg (120 lb)  Body mass index is 21.26 kg/m².  No intake or output data in the 24 hours ending 10/27/18 1020   Physical Exam   Constitutional: She is oriented to person, place, and time. She appears well-developed and well-nourished. No distress.   HENT:   Head: Normocephalic and atraumatic.   Eyes: Pupils are equal, round, and reactive to light.   Neck: Neck supple. No thyromegaly present.   Cardiovascular: Normal rate and regular rhythm. Exam reveals no gallop and no friction rub.   No murmur heard.  Pulmonary/Chest: Effort normal and breath sounds normal. No respiratory distress. She has no wheezes.   Abdominal: Soft. Bowel sounds are normal. She exhibits no distension. There is no tenderness. There is no guarding.   Musculoskeletal: Normal range of motion. She exhibits no edema.   Neurological: She is alert and oriented to person, place, and time.   Skin: Skin is warm and dry. No erythema.   Psychiatric: She has a normal mood and affect.       Significant Labs:   CBC:   Recent Labs   Lab 10/26/18  2348 10/27/18  0502   WBC 14.30* 13.40*   HGB 16.9* 15.0   HCT 51.7* 46.0    305       Significant Imaging: I have reviewed all pertinent imaging results/findings within the past 24 hours.    Assessment/Plan:      * Acute respiratory failure with hypoxia    Presented with increased SOB x 3 weeks.  Oxygen saturation noted at 89%, recovered well with supplemental oxygen via NC.  Likely due to pneumonia of LLL.  Pt is a daily smoker.  Likely has COPD, although she is not  currently treated with chronic meds.  Continue supplemental oxygen as needed.  Nebulizer treatments   Monitor and treat as clinically indicated.  Imaging as outpatient to exclude underlying neoplasm.      Tobacco abuse    Health hazards associated with cigarette smoking were reviewed with patient and cessation was encouraged. Nicotine replacement and counseling options were discussed.  Spent 3 minutes counseling on cessation, patient not ready to quit-- nicotine patch ordered.         Generalized anxiety disorder    Chronic; currently controlled.  Continue home regimen.       Essential hypertension    Chronic; stable.  Continue chronic meds.     Hypothyroidism due to Hashimoto's thyroiditis    Chronic; pt states controlled.  Continue levothyroxine.       Pneumonia of left lower lobe due to infectious organism    Presents with SOB; LLL pneumonia identified on CXR.    Initiate treatment regimen for CAP, which includes a beta lactam and macrolide.   IV Rocephin and IV azithromycin initiated in ED-continue  Nebulizer treatments             VTE Risk Mitigation (From admission, onward)        Ordered     enoxaparin injection 40 mg  Daily      10/27/18 0207     IP VTE HIGH RISK PATIENT  Once      10/27/18 0207     Place EVELYNE hose  Until discontinued      10/27/18 0207     Place sequential compression device  Until discontinued      10/27/18 0207              Keith Sagastume MD  Department of Hospital Medicine   [facility]

## 2018-10-28 LAB
ALBUMIN SERPL BCP-MCNC: 3 G/DL
ALP SERPL-CCNC: 72 U/L
ALT SERPL W/O P-5'-P-CCNC: 17 U/L
ANION GAP SERPL CALC-SCNC: 8 MMOL/L
AST SERPL-CCNC: 23 U/L
BASOPHILS # BLD AUTO: 0 K/UL
BASOPHILS NFR BLD: 0 %
BILIRUB SERPL-MCNC: 0.2 MG/DL
BUN SERPL-MCNC: 14 MG/DL
CALCIUM SERPL-MCNC: 9.1 MG/DL
CHLORIDE SERPL-SCNC: 99 MMOL/L
CO2 SERPL-SCNC: 26 MMOL/L
CREAT SERPL-MCNC: 0.9 MG/DL
DIFFERENTIAL METHOD: ABNORMAL
EOSINOPHIL # BLD AUTO: 0.2 K/UL
EOSINOPHIL NFR BLD: 1.6 %
ERYTHROCYTE [DISTWIDTH] IN BLOOD BY AUTOMATED COUNT: 13.2 %
EST. GFR  (AFRICAN AMERICAN): >60 ML/MIN/1.73 M^2
EST. GFR  (NON AFRICAN AMERICAN): >60 ML/MIN/1.73 M^2
GLUCOSE SERPL-MCNC: 120 MG/DL
HCT VFR BLD AUTO: 45.1 %
HGB BLD-MCNC: 15 G/DL
LYMPHOCYTES # BLD AUTO: 1.6 K/UL
LYMPHOCYTES NFR BLD: 16.6 %
MAGNESIUM SERPL-MCNC: 1.8 MG/DL
MCH RBC QN AUTO: 30.5 PG
MCHC RBC AUTO-ENTMCNC: 33.2 G/DL
MCV RBC AUTO: 92 FL
MONOCYTES # BLD AUTO: 0.1 K/UL
MONOCYTES NFR BLD: 1.4 %
NEUTROPHILS # BLD AUTO: 8 K/UL
NEUTROPHILS NFR BLD: 80.4 %
PHOSPHATE SERPL-MCNC: 4.3 MG/DL
PLATELET # BLD AUTO: 250 K/UL
PMV BLD AUTO: 9.7 FL
POTASSIUM SERPL-SCNC: 4.5 MMOL/L
PROT SERPL-MCNC: 6.1 G/DL
RBC # BLD AUTO: 4.9 M/UL
SODIUM SERPL-SCNC: 133 MMOL/L
WBC # BLD AUTO: 9.9 K/UL

## 2018-10-28 PROCEDURE — 80053 COMPREHEN METABOLIC PANEL: CPT

## 2018-10-28 PROCEDURE — 36415 COLL VENOUS BLD VENIPUNCTURE: CPT

## 2018-10-28 PROCEDURE — 25000003 PHARM REV CODE 250: Performed by: INTERNAL MEDICINE

## 2018-10-28 PROCEDURE — 94640 AIRWAY INHALATION TREATMENT: CPT

## 2018-10-28 PROCEDURE — 85025 COMPLETE CBC W/AUTO DIFF WBC: CPT

## 2018-10-28 PROCEDURE — 63600175 PHARM REV CODE 636 W HCPCS: Performed by: NURSE PRACTITIONER

## 2018-10-28 PROCEDURE — 25000242 PHARM REV CODE 250 ALT 637 W/ HCPCS: Performed by: INTERNAL MEDICINE

## 2018-10-28 PROCEDURE — 25000003 PHARM REV CODE 250: Performed by: NURSE PRACTITIONER

## 2018-10-28 PROCEDURE — 12000002 HC ACUTE/MED SURGE SEMI-PRIVATE ROOM

## 2018-10-28 PROCEDURE — 83735 ASSAY OF MAGNESIUM: CPT

## 2018-10-28 PROCEDURE — 27000221 HC OXYGEN, UP TO 24 HOURS

## 2018-10-28 PROCEDURE — 84100 ASSAY OF PHOSPHORUS: CPT

## 2018-10-28 PROCEDURE — 94761 N-INVAS EAR/PLS OXIMETRY MLT: CPT

## 2018-10-28 RX ADMIN — LEVOTHYROXINE SODIUM 50 MCG: 50 TABLET ORAL at 07:10

## 2018-10-28 RX ADMIN — IPRATROPIUM BROMIDE AND ALBUTEROL SULFATE 3 ML: .5; 3 SOLUTION RESPIRATORY (INHALATION) at 11:10

## 2018-10-28 RX ADMIN — IPRATROPIUM BROMIDE AND ALBUTEROL SULFATE 3 ML: .5; 3 SOLUTION RESPIRATORY (INHALATION) at 03:10

## 2018-10-28 RX ADMIN — IPRATROPIUM BROMIDE AND ALBUTEROL SULFATE 3 ML: .5; 3 SOLUTION RESPIRATORY (INHALATION) at 12:10

## 2018-10-28 RX ADMIN — ESCITALOPRAM 20 MG: 10 TABLET, FILM COATED ORAL at 08:10

## 2018-10-28 RX ADMIN — IPRATROPIUM BROMIDE AND ALBUTEROL SULFATE 3 ML: .5; 3 SOLUTION RESPIRATORY (INHALATION) at 07:10

## 2018-10-28 RX ADMIN — PROPRANOLOL HYDROCHLORIDE 80 MG: 80 TABLET ORAL at 09:10

## 2018-10-28 RX ADMIN — IPRATROPIUM BROMIDE AND ALBUTEROL SULFATE 3 ML: .5; 3 SOLUTION RESPIRATORY (INHALATION) at 04:10

## 2018-10-28 RX ADMIN — ENOXAPARIN SODIUM 40 MG: 100 INJECTION SUBCUTANEOUS at 04:10

## 2018-10-28 RX ADMIN — AZITHROMYCIN MONOHYDRATE 500 MG: 500 INJECTION, POWDER, LYOPHILIZED, FOR SOLUTION INTRAVENOUS at 02:10

## 2018-10-28 RX ADMIN — ONDANSETRON HYDROCHLORIDE 4 MG: 2 INJECTION INTRAMUSCULAR; INTRAVENOUS at 08:10

## 2018-10-28 RX ADMIN — AMITRIPTYLINE HYDROCHLORIDE 25 MG: 25 TABLET, FILM COATED ORAL at 09:10

## 2018-10-28 RX ADMIN — LORAZEPAM 1.5 MG: 1 TABLET ORAL at 04:10

## 2018-10-28 RX ADMIN — PANTOPRAZOLE SODIUM 40 MG: 40 TABLET, DELAYED RELEASE ORAL at 08:10

## 2018-10-28 RX ADMIN — PROPRANOLOL HYDROCHLORIDE 80 MG: 80 TABLET ORAL at 08:10

## 2018-10-28 RX ADMIN — ONDANSETRON HYDROCHLORIDE 4 MG: 2 INJECTION INTRAMUSCULAR; INTRAVENOUS at 04:10

## 2018-10-28 RX ADMIN — CEFTRIAXONE 1 G: 1 INJECTION, SOLUTION INTRAVENOUS at 12:10

## 2018-10-28 NOTE — SUBJECTIVE & OBJECTIVE
Interval History: patient seen and examined. Persistent hypoxemia    Review of Systems   Constitutional: Positive for fatigue. Negative for activity change, appetite change and fever.   HENT: Negative.    Eyes: Negative.    Respiratory: Positive for cough and shortness of breath. Negative for chest tightness and wheezing.    Cardiovascular: Negative for chest pain, palpitations and leg swelling.   Gastrointestinal: Negative for abdominal distention, abdominal pain, blood in stool, diarrhea and vomiting.   Genitourinary: Negative for dysuria and hematuria.   Neurological: Negative for headaches.   Hematological: Negative for adenopathy.   Psychiatric/Behavioral: Negative for confusion.     Objective:     Vital Signs (Most Recent):  Temp: 97.9 °F (36.6 °C) (10/28/18 0817)  Pulse: 86 (10/28/18 1131)  Resp: 14 (10/28/18 1131)  BP: (!) 125/59 (10/28/18 0817)  SpO2: (!) 68 %(O2 was off) (10/28/18 1131) Vital Signs (24h Range):  Temp:  [97.6 °F (36.4 °C)-98.3 °F (36.8 °C)] 97.9 °F (36.6 °C)  Pulse:  [60-86] 86  Resp:  [14-18] 14  SpO2:  [68 %-97 %] 68 %  BP: (111-145)/(59-70) 125/59     Weight: (137 lb per bed scale)  Body mass index is 21.26 kg/m².    Intake/Output Summary (Last 24 hours) at 10/28/2018 1414  Last data filed at 10/28/2018 0800  Gross per 24 hour   Intake 600 ml   Output 1275 ml   Net -675 ml      Physical Exam   Constitutional: She is oriented to person, place, and time. She appears well-developed and well-nourished. No distress.   HENT:   Head: Normocephalic and atraumatic.   Eyes: Pupils are equal, round, and reactive to light.   Neck: Neck supple. No thyromegaly present.   Cardiovascular: Normal rate and regular rhythm. Exam reveals no gallop and no friction rub.   No murmur heard.  Pulmonary/Chest: No respiratory distress. She has wheezes.   Abdominal: Soft. Bowel sounds are normal. She exhibits no distension. There is no tenderness. There is no guarding.   Musculoskeletal: Normal range of motion. She  exhibits no edema.   Neurological: She is alert and oriented to person, place, and time.   Skin: Skin is warm and dry. No erythema.   Psychiatric: She has a normal mood and affect.       Significant Labs:   CBC:   Recent Labs   Lab 10/26/18  2348 10/27/18  0502 10/28/18  0459   WBC 14.30* 13.40* 9.90   HGB 16.9* 15.0 15.0   HCT 51.7* 46.0 45.1    305 250       Significant Imaging: I have reviewed all pertinent imaging results/findings within the past 24 hours.

## 2018-10-28 NOTE — PROGRESS NOTES
10/28/18 0729   Patient Assessment/Suction   Level of Consciousness (AVPU) alert   All Lung Fields Breath Sounds diminished   Cough Type none   PRE-TX-O2-ETCO2   O2 Device (Oxygen Therapy) nasal cannula   $ Is the patient on Low Flow Oxygen? Yes   Flow (L/min) 2   SpO2 96 %   Pulse Oximetry Type Intermittent   $ Pulse Oximetry - Multiple Charge Pulse Oximetry - Multiple   Pulse 62   Resp 15   Aerosol Therapy   $ Aerosol Therapy Charges Aerosol Treatment   Respiratory Treatment Status given   SVN/Inhaler Treatment Route mask   Position During Treatment HOB at 30 degrees   Patient Tolerance good   Post-Treatment   Post-treatment Heart Rate (beats/min) 65   Post-treatment Resp Rate (breaths/min) 16   All Fields Breath Sounds unchanged

## 2018-10-28 NOTE — PLAN OF CARE
10/27/18 2012   Patient Assessment/Suction   Level of Consciousness (AVPU) alert   Respiratory Effort Normal;Unlabored   Expansion/Accessory Muscles/Retractions expansion symmetric;no retractions;no use of accessory muscles   All Lung Fields Breath Sounds diminished   PRE-TX-O2-ETCO2   O2 Device (Oxygen Therapy) nasal cannula   Flow (L/min) 2   Oxygen Concentration (%) 28   SpO2 (!) 94 %   Pulse Oximetry Type Intermittent   Pulse 64   Resp 18   Aerosol Therapy   $ Aerosol Therapy Charges Aerosol Treatment   Respiratory Treatment Status given   SVN/Inhaler Treatment Route mask;with oxygen   Position During Treatment HOB at 45 degrees   Patient Tolerance good   Post-Treatment   Post-treatment Heart Rate (beats/min) 71   Post-treatment Resp Rate (breaths/min) 18   All Fields Breath Sounds unchanged   Ready to Wean/Extubation Screen   FIO2<=50 (chart decimal) 0.28

## 2018-10-28 NOTE — PROGRESS NOTES
[facility] Hospital Medicine  Progress Note    Patient Name: Bertha Kelly  MRN: [id number]  Patient Class: IP- Inpatient   Admission Date: 10/26/2018  Length of Stay: 1 days  Attending Physician: Vicki Suarez MD  Primary Care Provider: James Colon MD        Subjective:     Principal Problem:Acute respiratory failure with hypoxia    HPI:  Bertha Kelly is a 69 y/o female with a PMHx of HTN and thyroid disease who presented to the ED today with c/o SOB which began 3 weeks ago and has progressively gotten worse.  Pt states that she becomes SOB with activity.  She reports that she received a pneumonia vaccine at her doctor's office about a month ago and began having some chest congestion and cough a few days later.  She is a current smoker of about 1/2 pack daily.  Reports increased fatigue and chills, but denies fever, chest pain, N/V, and dysuria.  Reports weight loss of a few pounds in the past 3 weeks due to poor appetite, but states that prior to this illness, weight was stable and she had a very good appetite.  Pt's CXR reveals a LLL pneumonia and was noted to be mildly hypoxic initially requiring supplemental oxygen.  She will be admitted to the service of hospital medicine for further treatment.    Hospital Course:  No notes on file    Interval History: patient seen and examined. Persistent hypoxemia    Review of Systems   Constitutional: Positive for fatigue. Negative for activity change, appetite change and fever.   HENT: Negative.    Eyes: Negative.    Respiratory: Positive for cough and shortness of breath. Negative for chest tightness and wheezing.    Cardiovascular: Negative for chest pain, palpitations and leg swelling.   Gastrointestinal: Negative for abdominal distention, abdominal pain, blood in stool, diarrhea and vomiting.   Genitourinary: Negative for dysuria and hematuria.   Neurological: Negative for headaches.   Hematological: Negative for adenopathy.    Psychiatric/Behavioral: Negative for confusion.     Objective:     Vital Signs (Most Recent):  Temp: 97.9 °F (36.6 °C) (10/28/18 0817)  Pulse: 86 (10/28/18 1131)  Resp: 14 (10/28/18 1131)  BP: (!) 125/59 (10/28/18 0817)  SpO2: (!) 68 %(O2 was off) (10/28/18 1131) Vital Signs (24h Range):  Temp:  [97.6 °F (36.4 °C)-98.3 °F (36.8 °C)] 97.9 °F (36.6 °C)  Pulse:  [60-86] 86  Resp:  [14-18] 14  SpO2:  [68 %-97 %] 68 %  BP: (111-145)/(59-70) 125/59     Weight: (137 lb per bed scale)  Body mass index is 21.26 kg/m².    Intake/Output Summary (Last 24 hours) at 10/28/2018 1414  Last data filed at 10/28/2018 0800  Gross per 24 hour   Intake 600 ml   Output 1275 ml   Net -675 ml      Physical Exam   Constitutional: She is oriented to person, place, and time. She appears well-developed and well-nourished. No distress.   HENT:   Head: Normocephalic and atraumatic.   Eyes: Pupils are equal, round, and reactive to light.   Neck: Neck supple. No thyromegaly present.   Cardiovascular: Normal rate and regular rhythm. Exam reveals no gallop and no friction rub.   No murmur heard.  Pulmonary/Chest: No respiratory distress. She has wheezes.   Abdominal: Soft. Bowel sounds are normal. She exhibits no distension. There is no tenderness. There is no guarding.   Musculoskeletal: Normal range of motion. She exhibits no edema.   Neurological: She is alert and oriented to person, place, and time.   Skin: Skin is warm and dry. No erythema.   Psychiatric: She has a normal mood and affect.       Significant Labs:   CBC:   Recent Labs   Lab 10/26/18  2348 10/27/18  0502 10/28/18  0459   WBC 14.30* 13.40* 9.90   HGB 16.9* 15.0 15.0   HCT 51.7* 46.0 45.1    305 250       Significant Imaging: I have reviewed all pertinent imaging results/findings within the past 24 hours.    Assessment/Plan:      * Acute respiratory failure with hypoxia    Presented with increased SOB x 3 weeks.  Oxygen saturation noted at 89%, recovered well with  supplemental oxygen via NC.  Likely due to pneumonia of LLL.  Pt is a daily smoker.  Likely has COPD, although she is not currently treated with chronic meds.  Continue supplemental oxygen as needed.  Nebulizer treatments   Monitor and treat as clinically indicated.  Imaging as outpatient to exclude underlying neoplasm.      Tobacco abuse    Health hazards associated with cigarette smoking were reviewed with patient and cessation was encouraged. Nicotine replacement and counseling options were discussed.  Spent 3 minutes counseling on cessation, patient not ready to quit-- nicotine patch ordered.         Generalized anxiety disorder    Chronic; currently controlled.  Continue home regimen.       Essential hypertension    Chronic; stable.  Continue chronic meds.     Hypothyroidism due to Hashimoto's thyroiditis    Chronic; pt states controlled.  Continue levothyroxine.       Pneumonia of left lower lobe due to infectious organism    Presents with SOB; LLL pneumonia identified on CXR.    Initiate treatment regimen for CAP, which includes a beta lactam and macrolide.   IV Rocephin and IV azithromycin initiated in ED-continue  Nebulizer treatments             VTE Risk Mitigation (From admission, onward)        Ordered     enoxaparin injection 40 mg  Daily      10/27/18 0207     IP VTE HIGH RISK PATIENT  Once      10/27/18 0207     Place EVELYNE hose  Until discontinued      10/27/18 0207     Place sequential compression device  Until discontinued      10/27/18 0207              Keith Sagastume MD  Department of Hospital Medicine   [facility]

## 2018-10-28 NOTE — PROGRESS NOTES
Pt states she feels her breathing has much improved, Pt remains free of falls, VS stable, RR even and unlabored, Abd non-distended, BS in all four quadrants, clear speech, makes needs known, bed in low position with wheels locked call light in reach, daughter at bedside, patient using bedside commode, able to transfer with asssitance

## 2018-10-28 NOTE — PLAN OF CARE
Problem: Patient Care Overview  Goal: Plan of Care Review  Outcome: Ongoing (interventions implemented as appropriate)  Patient AAOx4, VSS, afebrile this shift. PIV redressed, CDI, no redness or edema noted, flushed, infusing IV abx as scheduled. 02 PRN @ 2L. Lungs clear and diminished, no SOB, or difficulty breathing. POC reviewed with patient, open  Discussion facilitated, pt verbalized understanding. Comfort maintained throughout shift, no complaints of pain. Pt had good urine output last night. Ambulates to bathroom with stand by assist. SCDs and TEDs in place. Telemetry monitoring. Hourly rounding per unit protocol. Patient has remained free of falls, trauma, and injury this shift. Bed locked, in lowest position, bed alarm on, SR up x 2, call light within reach. Will continue to monitor, POC in progress.

## 2018-10-29 PROBLEM — J90 PLEURAL EFFUSION: Status: ACTIVE | Noted: 2018-10-29

## 2018-10-29 PROBLEM — R63.0 ANOREXIA: Status: ACTIVE | Noted: 2018-10-29

## 2018-10-29 PROBLEM — R06.02 SOB (SHORTNESS OF BREATH): Status: ACTIVE | Noted: 2018-10-29

## 2018-10-29 PROBLEM — R06.2 WHEEZE: Status: ACTIVE | Noted: 2018-10-29

## 2018-10-29 LAB
ALBUMIN SERPL BCP-MCNC: 2.7 G/DL
ALP SERPL-CCNC: 76 U/L
ALT SERPL W/O P-5'-P-CCNC: 18 U/L
ANION GAP SERPL CALC-SCNC: 9 MMOL/L
APPEARANCE FLD: NORMAL
AST SERPL-CCNC: 26 U/L
BACTERIA UR CULT: NORMAL
BASOPHILS # BLD AUTO: 0.1 K/UL
BASOPHILS NFR BLD: 0.6 %
BILIRUB SERPL-MCNC: 0.2 MG/DL
BODY FLD TYPE: NORMAL
BUN SERPL-MCNC: 8 MG/DL
CALCIUM SERPL-MCNC: 9.1 MG/DL
CHLORIDE SERPL-SCNC: 99 MMOL/L
CO2 SERPL-SCNC: 28 MMOL/L
COLOR FLD: YELLOW
CREAT SERPL-MCNC: 0.8 MG/DL
DIFFERENTIAL METHOD: ABNORMAL
EOSINOPHIL # BLD AUTO: 0.1 K/UL
EOSINOPHIL NFR BLD: 1.4 %
ERYTHROCYTE [DISTWIDTH] IN BLOOD BY AUTOMATED COUNT: 13.8 %
EST. GFR  (AFRICAN AMERICAN): >60 ML/MIN/1.73 M^2
EST. GFR  (NON AFRICAN AMERICAN): >60 ML/MIN/1.73 M^2
GLUCOSE FLD-MCNC: 107 MG/DL
GLUCOSE SERPL-MCNC: 131 MG/DL
HCT VFR BLD AUTO: 40.9 %
HGB BLD-MCNC: 13.3 G/DL
LYMPHOCYTES # BLD AUTO: 1.6 K/UL
LYMPHOCYTES NFR BLD: 16.6 %
LYMPHOCYTES NFR FLD MANUAL: 90 %
MAGNESIUM SERPL-MCNC: 1.9 MG/DL
MCH RBC QN AUTO: 29.9 PG
MCHC RBC AUTO-ENTMCNC: 32.5 G/DL
MCV RBC AUTO: 92 FL
MONOCYTES # BLD AUTO: 0.8 K/UL
MONOCYTES NFR BLD: 8.6 %
MONOS+MACROS NFR FLD MANUAL: 5 %
NEUTROPHILS # BLD AUTO: 6.8 K/UL
NEUTROPHILS NFR BLD: 72.8 %
NEUTROPHILS NFR FLD MANUAL: 5 %
PHOSPHATE SERPL-MCNC: 4.3 MG/DL
PLATELET # BLD AUTO: 255 K/UL
PMV BLD AUTO: 9.2 FL
POTASSIUM SERPL-SCNC: 4.2 MMOL/L
PROT FLD-MCNC: 3.1 G/DL
PROT SERPL-MCNC: 5.9 G/DL
RBC # BLD AUTO: 4.44 M/UL
SODIUM SERPL-SCNC: 136 MMOL/L
SPECIMEN SOURCE: NORMAL
SPECIMEN SOURCE: NORMAL
WBC # BLD AUTO: 9.4 K/UL
WBC # FLD: 1377 /CU MM

## 2018-10-29 PROCEDURE — 99900035 HC TECH TIME PER 15 MIN (STAT)

## 2018-10-29 PROCEDURE — 0W9B3ZX DRAINAGE OF LEFT PLEURAL CAVITY, PERCUTANEOUS APPROACH, DIAGNOSTIC: ICD-10-PCS | Performed by: INTERNAL MEDICINE

## 2018-10-29 PROCEDURE — 27000221 HC OXYGEN, UP TO 24 HOURS

## 2018-10-29 PROCEDURE — 27000646 HC AEROBIKA DEVICE

## 2018-10-29 PROCEDURE — 25000242 PHARM REV CODE 250 ALT 637 W/ HCPCS: Performed by: INTERNAL MEDICINE

## 2018-10-29 PROCEDURE — 99232 SBSQ HOSP IP/OBS MODERATE 35: CPT | Mod: ,,, | Performed by: INTERNAL MEDICINE

## 2018-10-29 PROCEDURE — 87106 FUNGI IDENTIFICATION YEAST: CPT

## 2018-10-29 PROCEDURE — 89051 BODY FLUID CELL COUNT: CPT

## 2018-10-29 PROCEDURE — 97162 PT EVAL MOD COMPLEX 30 MIN: CPT

## 2018-10-29 PROCEDURE — 32554 ASPIRATE PLEURA W/O IMAGING: CPT | Mod: LT,,, | Performed by: INTERNAL MEDICINE

## 2018-10-29 PROCEDURE — G8979 MOBILITY GOAL STATUS: HCPCS | Mod: CJ

## 2018-10-29 PROCEDURE — 88342 IMHCHEM/IMCYTCHM 1ST ANTB: CPT | Mod: 26,,, | Performed by: PATHOLOGY

## 2018-10-29 PROCEDURE — 87070 CULTURE OTHR SPECIMN AEROBIC: CPT | Mod: 59

## 2018-10-29 PROCEDURE — 36415 COLL VENOUS BLD VENIPUNCTURE: CPT

## 2018-10-29 PROCEDURE — 88112 CYTOPATH CELL ENHANCE TECH: CPT | Performed by: PATHOLOGY

## 2018-10-29 PROCEDURE — 83735 ASSAY OF MAGNESIUM: CPT

## 2018-10-29 PROCEDURE — G8978 MOBILITY CURRENT STATUS: HCPCS | Mod: CK

## 2018-10-29 PROCEDURE — 12000002 HC ACUTE/MED SURGE SEMI-PRIVATE ROOM

## 2018-10-29 PROCEDURE — 88104 CYTOPATH FL NONGYN SMEARS: CPT | Mod: 26,,, | Performed by: PATHOLOGY

## 2018-10-29 PROCEDURE — 88305 TISSUE EXAM BY PATHOLOGIST: CPT | Mod: 26,,, | Performed by: PATHOLOGY

## 2018-10-29 PROCEDURE — 99223 1ST HOSP IP/OBS HIGH 75: CPT | Mod: ,,, | Performed by: INTERNAL MEDICINE

## 2018-10-29 PROCEDURE — 80053 COMPREHEN METABOLIC PANEL: CPT

## 2018-10-29 PROCEDURE — 63600175 PHARM REV CODE 636 W HCPCS: Performed by: NURSE PRACTITIONER

## 2018-10-29 PROCEDURE — 85025 COMPLETE CBC W/AUTO DIFF WBC: CPT

## 2018-10-29 PROCEDURE — 87077 CULTURE AEROBIC IDENTIFY: CPT

## 2018-10-29 PROCEDURE — 94640 AIRWAY INHALATION TREATMENT: CPT

## 2018-10-29 PROCEDURE — 88341 IMHCHEM/IMCYTCHM EA ADD ANTB: CPT | Mod: 26,,, | Performed by: PATHOLOGY

## 2018-10-29 PROCEDURE — 97116 GAIT TRAINING THERAPY: CPT

## 2018-10-29 PROCEDURE — 84100 ASSAY OF PHOSPHORUS: CPT

## 2018-10-29 PROCEDURE — 63600175 PHARM REV CODE 636 W HCPCS: Performed by: INTERNAL MEDICINE

## 2018-10-29 PROCEDURE — 82945 GLUCOSE OTHER FLUID: CPT

## 2018-10-29 PROCEDURE — 87070 CULTURE OTHR SPECIMN AEROBIC: CPT

## 2018-10-29 PROCEDURE — 25000003 PHARM REV CODE 250: Performed by: NURSE PRACTITIONER

## 2018-10-29 PROCEDURE — 87186 SC STD MICRODIL/AGAR DIL: CPT

## 2018-10-29 PROCEDURE — 94761 N-INVAS EAR/PLS OXIMETRY MLT: CPT

## 2018-10-29 PROCEDURE — 94664 DEMO&/EVAL PT USE INHALER: CPT

## 2018-10-29 PROCEDURE — 25000003 PHARM REV CODE 250: Performed by: INTERNAL MEDICINE

## 2018-10-29 PROCEDURE — 84157 ASSAY OF PROTEIN OTHER: CPT

## 2018-10-29 PROCEDURE — 87205 SMEAR GRAM STAIN: CPT

## 2018-10-29 RX ORDER — KETOROLAC TROMETHAMINE 30 MG/ML
15 INJECTION, SOLUTION INTRAMUSCULAR; INTRAVENOUS EVERY 6 HOURS PRN
Status: DISCONTINUED | OUTPATIENT
Start: 2018-10-29 | End: 2018-10-31

## 2018-10-29 RX ORDER — IPRATROPIUM BROMIDE AND ALBUTEROL SULFATE 2.5; .5 MG/3ML; MG/3ML
3 SOLUTION RESPIRATORY (INHALATION) EVERY 8 HOURS
Status: DISCONTINUED | OUTPATIENT
Start: 2018-10-30 | End: 2018-11-10

## 2018-10-29 RX ORDER — METOPROLOL TARTRATE 50 MG/1
50 TABLET ORAL 2 TIMES DAILY
Status: DISCONTINUED | OUTPATIENT
Start: 2018-10-29 | End: 2018-11-14 | Stop reason: HOSPADM

## 2018-10-29 RX ORDER — ENOXAPARIN SODIUM 100 MG/ML
40 INJECTION SUBCUTANEOUS EVERY 24 HOURS
Status: DISCONTINUED | OUTPATIENT
Start: 2018-10-29 | End: 2018-11-04

## 2018-10-29 RX ADMIN — AZITHROMYCIN MONOHYDRATE 500 MG: 500 INJECTION, POWDER, LYOPHILIZED, FOR SOLUTION INTRAVENOUS at 02:10

## 2018-10-29 RX ADMIN — IPRATROPIUM BROMIDE AND ALBUTEROL SULFATE 3 ML: .5; 3 SOLUTION RESPIRATORY (INHALATION) at 03:10

## 2018-10-29 RX ADMIN — PANTOPRAZOLE SODIUM 40 MG: 40 TABLET, DELAYED RELEASE ORAL at 08:10

## 2018-10-29 RX ADMIN — IPRATROPIUM BROMIDE AND ALBUTEROL SULFATE 3 ML: .5; 3 SOLUTION RESPIRATORY (INHALATION) at 11:10

## 2018-10-29 RX ADMIN — IPRATROPIUM BROMIDE AND ALBUTEROL SULFATE 3 ML: .5; 3 SOLUTION RESPIRATORY (INHALATION) at 07:10

## 2018-10-29 RX ADMIN — CEFTRIAXONE 1 G: 1 INJECTION, SOLUTION INTRAVENOUS at 12:10

## 2018-10-29 RX ADMIN — IPRATROPIUM BROMIDE AND ALBUTEROL SULFATE 3 ML: .5; 3 SOLUTION RESPIRATORY (INHALATION) at 04:10

## 2018-10-29 RX ADMIN — IPRATROPIUM BROMIDE AND ALBUTEROL SULFATE 3 ML: .5; 3 SOLUTION RESPIRATORY (INHALATION) at 12:10

## 2018-10-29 RX ADMIN — ENOXAPARIN SODIUM 40 MG: 100 INJECTION SUBCUTANEOUS at 08:10

## 2018-10-29 RX ADMIN — LEVOTHYROXINE SODIUM 50 MCG: 50 TABLET ORAL at 06:10

## 2018-10-29 RX ADMIN — ACETAMINOPHEN 650 MG: 325 TABLET, FILM COATED ORAL at 06:10

## 2018-10-29 RX ADMIN — ESCITALOPRAM 20 MG: 10 TABLET, FILM COATED ORAL at 08:10

## 2018-10-29 RX ADMIN — PROPRANOLOL HYDROCHLORIDE 80 MG: 80 TABLET ORAL at 08:10

## 2018-10-29 RX ADMIN — ONDANSETRON HYDROCHLORIDE 4 MG: 2 INJECTION INTRAMUSCULAR; INTRAVENOUS at 04:10

## 2018-10-29 NOTE — PLAN OF CARE
Problem: Patient Care Overview  Goal: Plan of Care Review  Outcome: Outcome(s) achieved Date Met: 10/29/18  Patient AAOx4 for majority of the time, if O2 comes off in sleep or speaking with pt after she awakes she gets slightly confused and takes a little while to refocus. 02 PRN @ 3L, O2 sats dropped when removed, extension placed on BNC to reach the bathroom/bedside commode. All  Breath sounds diminished, LLL RLL expiratory wheezes, pt winded when speaking. Slightly hypotensive but what she has been running. Scattered BUE bruising, pale. New IV insertion, CDI, no redness or edema noted, flushed, infusing IV abx as scheduled. POC reviewed with patient, daughter, and , open  Discussion facilitated, pt verbalized understanding. Comfort maintained throughout shift, no complaints of pain. Nausea relief with PRN meds. Pt had good urine output last night.  SCDs and TEDs in place. Telemetry monitoring. Hourly rounding per unit protocol. Patient has remained free of falls, trauma, and injury this shift. Bed locked, in lowest position, bed alarm on, SR up x 2, call light within reach. Will continue to monitor, POC in progress.

## 2018-10-29 NOTE — PROCEDURES
10/29/2018    After review of cxr, exam, informed consent, time out- pt underwent left thoracentesis.  1500 cc clear jered fluid removed.  Pt c/o 4/10 chest pain at end of procedure - 30 cc air injected and pain resolved.      Fluid for protein, glucose, culture, cytology , and cell count.    Post cxr ordered.  Other than above pain, no complications and ebl 0.

## 2018-10-29 NOTE — PROGRESS NOTES
Progress Note  Hospital Medicine  Patient Name:Bertha Kelly  MRN:  [id number]  Patient Class: IP- Inpatient  Admit Date: 10/26/2018  Length of Stay: 2 days  Expected Discharge Date:   Attending Physician: Vicki Suarez MD  Primary Care Provider:  James Colon MD    SUBJECTIVE:     Principal Problem: Acute respiratory failure with hypoxia  Initial history of present illness: Bertha Kelly is a 67 y/o female with a PMHx of HTN and thyroid disease who presented to the ED today with c/o SOB which began 3 weeks ago and has progressively gotten worse.  Pt states that she becomes SOB with activity.  She reports that she received a pneumonia vaccine at her doctor's office about a month ago and began having some chest congestion and cough a few days later.  She is a current smoker of about 1/2 pack daily.  Reports increased fatigue and chills, but denies fever, chest pain, N/V, and dysuria.  Reports weight loss of a few pounds in the past 3 weeks due to poor appetite, but states that prior to this illness, weight was stable and she had a very good appetite.  Pt's CXR reveals a LLL pneumonia and was noted to be mildly hypoxic initially requiring supplemental oxygen.  She will be admitted to the service of hospital medicine for further treatment.    PMH/PSH/SH/FH/Meds: reviewed.    Symptoms/Review of Systems: Afebrile.  Patient is still with significant SOB, wheezing and yellow brown productive cough. No chest pain or headache, fever or abdominal pain.     Diet:  Adequate intake.    Activity level: Normal.    Pain:  Patient reports no pain.       OBJECTIVE:   Vital Signs (Most Recent):      Temp: 98.4 °F (36.9 °C) (10/29/18 0750)  Pulse: (!) 59 (10/29/18 0750)  Resp: 14 (10/29/18 0750)  BP: (!) 106/54 (10/29/18 0750)  SpO2: 97 % (10/29/18 0750)       Vital Signs Range (Last 24H):  Temp:  [97.2 °F (36.2 °C)-98.4 °F (36.9 °C)]   Pulse:  [54-88]   Resp:  [14-24]   BP: (104-116)/(52-59)   SpO2:  [68 %-100 %]     Weight: 59.5  kg (131 lb 4 oz)  Body mass index is 23.25 kg/m².    Intake/Output Summary (Last 24 hours) at 10/29/2018 0902  Last data filed at 10/29/2018 0600  Gross per 24 hour   Intake 450 ml   Output 350 ml   Net 100 ml     Physical Examination:  Constitutional: She is oriented to person, place, and time. She appears well-developed and well-nourished. No distress.   HENT:   Head: Normocephalic and atraumatic.   Eyes: Pupils are equal, round, and reactive to light.   Neck: Neck supple. No thyromegaly present.   Cardiovascular: Normal rate and regular rhythm. Exam reveals no gallop and no friction rub.   No murmur heard.  Pulmonary/Chest: No respiratory distress. She has wheezes.   Abdominal: Soft. Bowel sounds are normal. She exhibits no distension. There is no tenderness. There is no guarding.   Musculoskeletal: Normal range of motion. She exhibits no edema.   Neurological: She is alert and oriented to person, place, and time.   Skin: Skin is warm and dry. No erythema.   Psychiatric: She has a normal mood and affect.     CBC:  Recent Labs   Lab 10/27/18  0502 10/28/18  0459 10/29/18  0413   WBC 13.40* 9.90 9.40   RBC 5.09 4.90 4.44   HGB 15.0 15.0 13.3   HCT 46.0 45.1 40.9    250 255   MCV 90 92 92   MCH 29.4 30.5 29.9   MCHC 32.5 33.2 32.5   BMP  Recent Labs   Lab 10/27/18  0502 10/28/18  0459 10/29/18  0413   * 120* 131*    133* 136   K 3.7 4.5 4.2    99 99   CO2 25 26 28   BUN 19 14 8   CREATININE 0.9 0.9 0.8   CALCIUM 9.2 9.1 9.1   MG 1.7 1.8 1.9      Diagnostic Results:  Microbiology Results (last 7 days)     Procedure Component Value Units Date/Time    Blood culture x two cultures. Draw prior to antibiotics. [[id number]] Collected:  10/26/18 8731    Order Status:  Completed Specimen:  Blood from Peripheral, Right  Wrist Updated:  10/28/18 1212     Blood Culture, Routine No Growth to date     Blood Culture, Routine No Growth to date    Narrative:       Aerobic and anaerobic    Blood culture x two  cultures. Draw prior to antibiotics. [[id number]] Collected:  10/26/18 2348    Order Status:  Completed Specimen:  Blood from Peripheral, Left  Arm Updated:  10/28/18 1212     Blood Culture, Routine No Growth to date     Blood Culture, Routine No Growth to date    Narrative:       Aerobic and anaerobic    Urine culture [[id number]] Collected:  10/27/18 2330    Order Status:  No result Specimen:  Urine Updated:  10/27/18 2343    Culture, Respiratory with Gram Stain [[id number]]     Order Status:  No result Specimen:  Respiratory          CXR: The cardiomediastinal silhouette is normal in appearance.  No pulmonary vascular congestion appreciated. There is dense airspace consolidative change present at the left lung base and there is patchy segmental airspace opacity in the left mid and upper lung zones.  Differential considerations include pneumonia and aspiration.  Underlying pleural fluid cannot be excluded.  An underlying mass would be difficult to exclude.  Serial radiography will be necessary to ensure resolution and exclude an underlying lesion.  There is an age-indeterminate, possibly remote, right posterior 10th rib fracture.  Assessment/Plan:     * Acute respiratory failure with hypoxia     Presented with increased SOB x 3 weeks.  Oxygen saturation noted at 89%, recovered well with supplemental oxygen via NC.  Likely due to pneumonia of LLL.  Pt is a daily smoker.  Likely has COPD, although she is not currently treated with chronic meds.  Continue supplemental oxygen as needed.  Nebulizer treatments   Monitor and treat as clinically indicated.  Likely needs CT chest and pulmonologist evaluation; will proceed with Dr. Abdi's evaluation. May need left thoracentesis for empyema evaluation.      Tobacco abuse     Smoking cessation counseling performed. Dangers of cigarette smoking were reviewed with patient in detail and patient was encouraged to quit. Nicotine replacement options were discussed for > 3 minutes.       Generalized anxiety disorder     Chronic; currently controlled.  Continue home regimen.      Essential hypertension     Chronic; stable.  Continue chronic meds.      Hypothyroidism due to Hashimoto's thyroiditis     Chronic; pt states controlled.  Continue levothyroxine.      Pneumonia of left lower lobe due to infectious organism     Presents with SOB; LLL pneumonia identified on CXR.    Initiate treatment regimen for CAP, which includes a beta lactam and macrolide.   IV Rocephin and IV azithromycin.  Nebulizer treatments     Discussed with patient's .   VTE Risk Mitigation (From admission, onward)        Ordered     enoxaparin injection 40 mg  Daily      10/27/18 0207     IP VTE HIGH RISK PATIENT  Once      10/27/18 0207     Place EVELYNE hose  Until discontinued      10/27/18 0207     Place sequential compression device  Until discontinued      10/27/18 0207        Vicki Suarez MD  Department of Hospital Medicine   [facility]

## 2018-10-29 NOTE — PLAN OF CARE
10/29/18 0721   Patient Assessment/Suction   Level of Consciousness (AVPU) alert   Respiratory Effort Normal;Unlabored   ROCIO Breath Sounds diminished   LLL Breath Sounds diminished   RUL Breath Sounds diminished;crackles fine   RML Breath Sounds diminished;crackles fine   RLL Breath Sounds diminished;crackles fine   PRE-TX-O2-ETCO2   O2 Device (Oxygen Therapy) nasal cannula   $ Is the patient on Low Flow Oxygen? Yes   Flow (L/min) 3   Oxygen Concentration (%) 32   SpO2 (!) 93 %   Pulse Oximetry Type Intermittent   $ Pulse Oximetry - Multiple Charge Pulse Oximetry - Multiple   Pulse 60   Resp (!) 24   Aerosol Therapy   $ Aerosol Therapy Charges Aerosol Treatment   Respiratory Treatment Status given   SVN/Inhaler Treatment Route in-line   Position During Treatment HOB at 30 degrees   Patient Tolerance good   Post-Treatment   Post-treatment Heart Rate (beats/min) 64   Post-treatment Resp Rate (breaths/min) 16   All Fields Breath Sounds unchanged   Ready to Wean/Extubation Screen   FIO2<=50 (chart decimal) 0.32

## 2018-10-29 NOTE — PLAN OF CARE
10/29/18 1132   Patient Assessment/Suction   Cough Type good;loose;nonproductive   Vibratory PEP Therapy   $ Vibratory PEP Charges Aerobika Therapy   $ Vibratory PEP Equipment Aerobika Equipment   $ Vibratory PEP Tech Time Charges 15 min   Type flutter   Administration done with encouragement   Number of Repetitions 15   ISU and instruct with Aerobika device. Pt had some difficutly understanding how to use.

## 2018-10-29 NOTE — PLAN OF CARE
Problem: Physical Therapy Goal  Goal: Physical Therapy Goal  Goals to be met by: 2018     Patient will increase functional independence with mobility by performin. Supine to sit with Modified [place]  2. Sit to stand transfer with Contact Guard Assistance  3. Bed to chair transfer with Contact Guard Assistance using Rolling Walker  4. Gait  x 250 feet with Contact Guard Assistance using Rolling Walker.   5. Lower extremity exercise program x20 reps per handout, with assistance as needed    Outcome: Ongoing (interventions implemented as appropriate)  PT eval and treat completed. Pt ambulated short distance in room with O2. C/o fatigue

## 2018-10-29 NOTE — CONSULTS
"  10/29/2018      Admit Date: 10/26/2018  Bertha Kelly  New Patient Consult    Chief Complaint   Patient presents with    Shortness of Breath     worse x 3 days     Cough     x 3 days     Fatigue     feeling bad since pneumonia shot 1 month ago       History of Present Illness:  1/2 ppd smoker, uses lexapro/xanax/amitriptyline/thyroid -pt seen a month ago Dr Colon.  Go penumovax.  Then loss appetite,sob,vague weakness.  No fever/night sweats.  No resp meds nor 03 use.  Pt has had ,muffled voice/hoaresemess.   No pain.  No cough/hemoptysis.     Pt had similar symptoms 6 months ago prior to a cruise lasted a wk.  Resolved?      PFSH:  Past Medical History:   Diagnosis Date    Hypertension     Thyroid disease      Past Surgical History:   Procedure Laterality Date    EYE SURGERY      childhood     Social History     Tobacco Use    Smoking status: Current Every Day Smoker     Packs/day: 0.50     Types: Cigarettes    Smokeless tobacco: Never Used   Substance Use Topics    Alcohol use: No     Frequency: Never    Drug use: No     Family History   Problem Relation Age of Onset    Hypertension Father      Review of patient's allergies indicates:   Allergen Reactions    Codeine Hives    Penicillins Hives       Performance Status:Performance Status:The patient's activity level is no limits with regular activity.    Review of Systems:  a review of eleven systems covering constitutional, Psych, Eye, HEENT, Respiratory, Cardiac, GI, , Musculoskeletal, Endocrine, Dermatologicwas negative except the above mentioned abnormalities and for any pertinent findings as listed below: pertinent positive as above, rest is good         Exam:Comprehensive exam done. /81   Pulse 68   Temp 98.9 °F (37.2 °C)   Resp (!) 24   Ht 5' 3" (1.6 m)   Wt 59.5 kg (131 lb 4 oz)   SpO2 (!) 94%   Breastfeeding? No   BMI 23.25 kg/m²   Exam included Vitals as listed, and patient's appearance and affect and alertness and mood, " oral exam for yeast and hygiene and pharynx lesions and Mallapatti (M) score, neck with inspection for jvd and masses and thyroid abnormalities and lymph nodes (supraclavicular and infraclavicular nodes also examined and noted if abn), chest exam included symmetry and effort and fremitus and percussion and auscultation, cardiac exam included rhythm and gallops and murmur and rubs and jvd and edema, abdominal exam for mass and hepatosplenomegaly and tenderness and hernias and bowel sounds, Musculoskeletal exam with muscle tone and posture and mobility/gait and  strenght, and skin for rashes and cyanosis and pallor and turgor, extremity for clubbing.  Findings were normal except as listed below:  M2, slender, dull 2/3 up left, decreased fremitus left, faint insp exp wheeze, rest good, no clubbijng.    Radiographs reviewed: view by direct vision  lg left effusion.  No results found for this or any previous visit.]    Labs     Recent Labs   Lab 10/29/18  0413   WBC 9.40   HGB 13.3   HCT 40.9        Recent Labs   Lab 10/29/18  0413      K 4.2   CL 99   CO2 28   BUN 8   CREATININE 0.8   *   CALCIUM 9.1   MG 1.9   PHOS 4.3   AST 26   ALT 18   ALKPHOS 76   BILITOT 0.2   PROT 5.9*   ALBUMIN 2.7*   No results for input(s): PH, PCO2, PO2, HCO3 in the last 24 hours.  Microbiology Results (last 7 days)     Procedure Component Value Units Date/Time    Culture, Body Fluid (Aerobic) w/ GS [[id number]]     Order Status:  No result Specimen:  Body Fluid from Pleural Fluid     Culture, Respiratory with Gram Stain [[id number]] Collected:  10/29/18 1401    Order Status:  Sent Specimen:  Respiratory from Sputum, Expectorated Updated:  10/29/18 1401    Urine culture [[id number]] Collected:  10/27/18 2330    Order Status:  Completed Specimen:  Urine Updated:  10/29/18 1221     Urine Culture, Routine --     COAGULASE-NEGATIVE STAPHYLOCOCCUS SPECIES  10,000 - 49,999 cfu/ml  Susceptibility testing not routinely  performed.  No other significant isolate      Narrative:       Preferred Collection Type->Urine, Clean Catch    Blood culture x two cultures. Draw prior to antibiotics. [[id number]] Collected:  10/26/18 2348    Order Status:  Completed Specimen:  Blood from Peripheral, Left  Arm Updated:  10/29/18 1212     Blood Culture, Routine No Growth to date     Blood Culture, Routine No Growth to date     Blood Culture, Routine No Growth to date    Narrative:       Aerobic and anaerobic    Blood culture x two cultures. Draw prior to antibiotics. [[id number]] Collected:  10/26/18 2348    Order Status:  Completed Specimen:  Blood from Peripheral, Right  Wrist Updated:  10/29/18 1212     Blood Culture, Routine No Growth to date     Blood Culture, Routine No Growth to date     Blood Culture, Routine No Growth to date    Narrative:       Aerobic and anaerobic          Impression:  Active Hospital Problems    Diagnosis  POA    *Acute respiratory failure with hypoxia [J96.01]  Yes    Pleural effusion [J90]  Yes    Wheeze [R06.2]  Yes    Anorexia [R63.0]  Yes    SOB (shortness of breath) [R06.02]  Yes    Pneumonia of left lower lobe due to infectious organism [J18.1]  Yes    Hypothyroidism due to Hashimoto's thyroiditis [E03.8, E06.3]  Yes    Essential hypertension [I10]  Yes    Generalized anxiety disorder [F41.1]  Yes    Tobacco abuse [Z72.0]  Yes      Resolved Hospital Problems   No resolved problems to display.     Plan:   Needs thoracentesis, needs ct chest later.  Effusion large for para pneumonic alone.    Change inderal to propranolol.

## 2018-10-29 NOTE — PT/OT/SLP EVAL
Physical Therapy Evaluation    Patient Name:  Bertha Kelly   MRN:  [id number]    Recommendations:     Discharge Recommendations:  home health PT   Discharge Equipment Recommendations:     Barriers to discharge: None    Assessment:     Bertha Kelly is a 68 y.o. female admitted with a medical diagnosis of Acute respiratory failure with hypoxia.  She presents with the following impairments/functional limitations:  weakness, impaired endurance, impaired cardiopulmonary response to activity, impaired self care skills, gait instability . Pt presents with fatigue  With no energy. Pt ambulated short distance in room and bathroom. Pt to benefit from continued therapies .    Rehab Prognosis:  fair; patient would benefit from acute skilled PT services to address these deficits and reach maximum level of function.      Recent Surgery: * No surgery found *      Plan:     During this hospitalization, patient to be seen 6 x/week to address the above listed problems via gait training, therapeutic activities, therapeutic exercises  · Plan of Care Expires:  11/30/18   Plan of Care Reviewed with: patient    Subjective     Communicated with nurse Muñiz prior to session.  Patient found up to bathroom upon PT entry to room, agreeable to evaluation.    Pt had voiding problems this pm asking PT to turn on faucet  Stated not feeling well and not wanting to walk outside of room    Chief Complaint: stated of being tired  Patient comments/goals: walk tomorrow  Pain/Comfort:  · Pain Rating 1: 0/10    Patients cultural, spiritual, Adventist conflicts given the current situation:      Living Environment:  Home with family  Prior to admission, patients level of function was ambulatory but with increasing difficulty.  Patient has the following equipment: none.  DME owned (not currently used): .  Upon discharge, patient will have assistance from family.    Objective:     Patient found with: oxygen     General Precautions: Standard, fall,  respiratory   Orthopedic Precautions:N/A   Braces: N/A     Exams:  · RLE ROM: WFL  · RLE Strength: WFL  · LLE ROM: WFL  · LLE Strength: WFL    Functional Mobility:  · Bed Mobility:     · Scooting: stand by assistance  · Sit to Supine: stand by assistance  · Transfers:     · Sit to Stand:  contact guard assistance with no AD  · Toilet Transfer: contact guard assistance with  hand-held assist  using  Stand Pivot  · Gait: 30ft within room and to sink, has long O2 tubing    AM-PAC 6 CLICK MOBILITY  Total Score:16       Therapeutic Activities and Exercises:   bathroom use to void with set up help for hygiene care  To sink to wash hands  Back to bed post PT- educated to call for all OOB activities- RT at bedside for sputum sample    Patient left HOB elevated with all lines intact and call button in reach.    GOALS:   Multidisciplinary Problems     Physical Therapy Goals        Problem: Physical Therapy Goal    Goal Priority Disciplines Outcome Goal Variances Interventions   Physical Therapy Goal     PT, PT/OT Ongoing (interventions implemented as appropriate)     Description:  Goals to be met by: 2018     Patient will increase functional independence with mobility by performin. Supine to sit with Modified [place]  2. Sit to stand transfer with Contact Guard Assistance  3. Bed to chair transfer with Contact Guard Assistance using Rolling Walker  4. Gait  x 250 feet with Contact Guard Assistance using Rolling Walker.   5. Lower extremity exercise program x20 reps per handout, with assistance as needed                      History:     Past Medical History:   Diagnosis Date    Hypertension     Thyroid disease        Past Surgical History:   Procedure Laterality Date    EYE SURGERY      childhood       Clinical Decision Making:     History  Co-morbidities and personal factors that may impact the plan of care Examination  Body Structures and Functions, activity limitations and participation restrictions  that may impact the plan of care Clinical Presentation   Decision Making/ Complexity Score   Co-morbidities:   [] Time since onset of injury / illness / exacerbation  [] Status of current condition  []Patient's cognitive status and safety concerns    [] Multiple Medical Problems (see med hx)  Personal Factors:   [] Patient's age  [] Prior Level of function   [] Patient's home situation (environment and family support)  [] Patient's level of motivation  [] Expected progression of patient      HISTORY:(criteria)    [] 63386 - no personal factors/history    [] 25253 - has 1-2 personal factor/comorbidity     [] 86340 - has >3 personal factor/comorbidity     Body Regions:  [] Objective examination findings  [] Head     []  Neck  [] Trunk   [] Upper Extremity  [] Lower Extremity    Body Systems:  [] For communication ability, affect, cognition, language, and learning style: the assessment of the ability to make needs known, consciousness, orientation (person, place, and time), expected emotional /behavioral responses, and learning preferences (eg, learning barriers, education  needs)  [] For the neuromuscular system: a general assessment of gross coordinated movement (eg, balance, gait, locomotion, transfers, and transitions) and motor function  (motor control and motor learning)  [] For the musculoskeletal system: the assessment of gross symmetry, gross range of motion, gross strength, height, and weight  [] For the integumentary system: the assessment of pliability(texture), presence of scar formation, skin color, and skin integrity  [] For cardiovascular/pulmonary system: the assessment of heart rate, respiratory rate, blood pressure, and edema     Activity limitations:    [] Patient's cognitive status and saf ety concerns          [] Status of current condition      [] Weight bearing restriction  [] Cardiopulmunary Restriction    Participation Restrictions:   [] Goals and goal agreement with the patient     [] Rehab  potential (prognosis) and probable outcome      Examination of Body System: (criteria)    [] 12373 - addressing 1-2 elements    [] 74930 - addressing a total of 3 or more elements     [] 76148 -  Addressing a total of 4 or more elements         Clinical Presentation: (criteria)  Choose one     On examination of body system using standardized tests and measures patient presents with (CHOOSE ONE) elements from any of the following: body structures and functions, activity limitations, and/or participation restrictions.  Leading to a clinical presentation that is considered (CHOOSE ONE)                              Clinical Decision Making  (Eval Complexity):  Choose One     Time Tracking:     PT Received On: 10/29/18  PT Start Time: 1349     PT Stop Time: 1405  PT Total Time (min): 16 min     Billable Minutes: Evaluation 8 and Gait Training 8      Lora Fischer, PT  10/29/2018

## 2018-10-29 NOTE — PLAN OF CARE
10/28/18 1958   Patient Assessment/Suction   Level of Consciousness (AVPU) alert   Respiratory Effort Normal;Unlabored   Expansion/Accessory Muscles/Retractions expansion symmetric;no retractions;no use of accessory muscles   All Lung Fields Breath Sounds diminished   Cough Type nonproductive   PRE-TX-O2-ETCO2   O2 Device (Oxygen Therapy) nasal cannula   SpO2 (!) 92 %   Pulse Oximetry Type Intermittent   Pulse 70   Resp 20   Aerosol Therapy   $ Aerosol Therapy Charges Aerosol Treatment   Respiratory Treatment Status given   SVN/Inhaler Treatment Route mask   Position During Treatment HOB at 45 degrees   Patient Tolerance good   Post-Treatment   Post-treatment Heart Rate (beats/min) 75   Post-treatment Resp Rate (breaths/min) 18   All Fields Breath Sounds unchanged

## 2018-10-30 ENCOUNTER — CLINICAL SUPPORT (OUTPATIENT)
Dept: SMOKING CESSATION | Facility: CLINIC | Age: 68
End: 2018-10-30
Payer: COMMERCIAL

## 2018-10-30 DIAGNOSIS — F17.200 NICOTINE DEPENDENCE: Primary | ICD-10-CM

## 2018-10-30 LAB
ALBUMIN SERPL BCP-MCNC: 2.6 G/DL
ALP SERPL-CCNC: 80 U/L
ALT SERPL W/O P-5'-P-CCNC: 34 U/L
ANION GAP SERPL CALC-SCNC: 6 MMOL/L
AST SERPL-CCNC: 44 U/L
BASOPHILS # BLD AUTO: 0 K/UL
BASOPHILS NFR BLD: 0.1 %
BILIRUB SERPL-MCNC: 0.2 MG/DL
BUN SERPL-MCNC: 8 MG/DL
CALCIUM SERPL-MCNC: 9.1 MG/DL
CHLORIDE SERPL-SCNC: 95 MMOL/L
CO2 SERPL-SCNC: 36 MMOL/L
CREAT SERPL-MCNC: 0.7 MG/DL
DIFFERENTIAL METHOD: ABNORMAL
EOSINOPHIL # BLD AUTO: 0.1 K/UL
EOSINOPHIL NFR BLD: 0.9 %
ERYTHROCYTE [DISTWIDTH] IN BLOOD BY AUTOMATED COUNT: 13.9 %
EST. GFR  (AFRICAN AMERICAN): >60 ML/MIN/1.73 M^2
EST. GFR  (NON AFRICAN AMERICAN): >60 ML/MIN/1.73 M^2
GLUCOSE SERPL-MCNC: 88 MG/DL
HCT VFR BLD AUTO: 42.3 %
HGB BLD-MCNC: 13.5 G/DL
LYMPHOCYTES # BLD AUTO: 1.2 K/UL
LYMPHOCYTES NFR BLD: 10.7 %
MAGNESIUM SERPL-MCNC: 1.7 MG/DL
MCH RBC QN AUTO: 29.5 PG
MCHC RBC AUTO-ENTMCNC: 31.9 G/DL
MCV RBC AUTO: 93 FL
MONOCYTES # BLD AUTO: 1.1 K/UL
MONOCYTES NFR BLD: 9.6 %
NEUTROPHILS # BLD AUTO: 8.7 K/UL
NEUTROPHILS NFR BLD: 78.7 %
PATH INTERP FLD-IMP: NORMAL
PHOSPHATE SERPL-MCNC: 2.8 MG/DL
PLATELET # BLD AUTO: 229 K/UL
PMV BLD AUTO: 9.8 FL
POCT GLUCOSE: 140 MG/DL (ref 70–110)
POTASSIUM SERPL-SCNC: 3.8 MMOL/L
PROT SERPL-MCNC: 5.8 G/DL
RBC # BLD AUTO: 4.57 M/UL
SODIUM SERPL-SCNC: 137 MMOL/L
WBC # BLD AUTO: 11.1 K/UL

## 2018-10-30 PROCEDURE — 99232 SBSQ HOSP IP/OBS MODERATE 35: CPT | Mod: ,,, | Performed by: INTERNAL MEDICINE

## 2018-10-30 PROCEDURE — 63600175 PHARM REV CODE 636 W HCPCS: Performed by: NURSE PRACTITIONER

## 2018-10-30 PROCEDURE — 94761 N-INVAS EAR/PLS OXIMETRY MLT: CPT

## 2018-10-30 PROCEDURE — 25000003 PHARM REV CODE 250: Performed by: NURSE PRACTITIONER

## 2018-10-30 PROCEDURE — 12000002 HC ACUTE/MED SURGE SEMI-PRIVATE ROOM

## 2018-10-30 PROCEDURE — 80053 COMPREHEN METABOLIC PANEL: CPT

## 2018-10-30 PROCEDURE — 99900035 HC TECH TIME PER 15 MIN (STAT)

## 2018-10-30 PROCEDURE — 97530 THERAPEUTIC ACTIVITIES: CPT

## 2018-10-30 PROCEDURE — 25000003 PHARM REV CODE 250: Performed by: INTERNAL MEDICINE

## 2018-10-30 PROCEDURE — 97116 GAIT TRAINING THERAPY: CPT

## 2018-10-30 PROCEDURE — 85025 COMPLETE CBC W/AUTO DIFF WBC: CPT

## 2018-10-30 PROCEDURE — 94640 AIRWAY INHALATION TREATMENT: CPT

## 2018-10-30 PROCEDURE — 36415 COLL VENOUS BLD VENIPUNCTURE: CPT

## 2018-10-30 PROCEDURE — 94664 DEMO&/EVAL PT USE INHALER: CPT

## 2018-10-30 PROCEDURE — 25000242 PHARM REV CODE 250 ALT 637 W/ HCPCS: Performed by: INTERNAL MEDICINE

## 2018-10-30 PROCEDURE — 84100 ASSAY OF PHOSPHORUS: CPT

## 2018-10-30 PROCEDURE — 27000646 HC AEROBIKA DEVICE

## 2018-10-30 PROCEDURE — 99407 BEHAV CHNG SMOKING > 10 MIN: CPT | Mod: S$GLB,,, | Performed by: HOSPITALIST

## 2018-10-30 PROCEDURE — 27000221 HC OXYGEN, UP TO 24 HOURS

## 2018-10-30 PROCEDURE — 83735 ASSAY OF MAGNESIUM: CPT

## 2018-10-30 PROCEDURE — 63600175 PHARM REV CODE 636 W HCPCS: Performed by: INTERNAL MEDICINE

## 2018-10-30 RX ADMIN — ENOXAPARIN SODIUM 40 MG: 100 INJECTION SUBCUTANEOUS at 10:10

## 2018-10-30 RX ADMIN — LEVOTHYROXINE SODIUM 50 MCG: 50 TABLET ORAL at 06:10

## 2018-10-30 RX ADMIN — PANTOPRAZOLE SODIUM 40 MG: 40 TABLET, DELAYED RELEASE ORAL at 09:10

## 2018-10-30 RX ADMIN — IPRATROPIUM BROMIDE AND ALBUTEROL SULFATE 3 ML: .5; 3 SOLUTION RESPIRATORY (INHALATION) at 07:10

## 2018-10-30 RX ADMIN — METOPROLOL TARTRATE 50 MG: 50 TABLET ORAL at 09:10

## 2018-10-30 RX ADMIN — AMITRIPTYLINE HYDROCHLORIDE 25 MG: 25 TABLET, FILM COATED ORAL at 10:10

## 2018-10-30 RX ADMIN — ESCITALOPRAM 20 MG: 10 TABLET, FILM COATED ORAL at 09:10

## 2018-10-30 RX ADMIN — AZITHROMYCIN MONOHYDRATE 500 MG: 500 INJECTION, POWDER, LYOPHILIZED, FOR SOLUTION INTRAVENOUS at 02:10

## 2018-10-30 RX ADMIN — IPRATROPIUM BROMIDE AND ALBUTEROL SULFATE 3 ML: .5; 3 SOLUTION RESPIRATORY (INHALATION) at 03:10

## 2018-10-30 RX ADMIN — CEFTRIAXONE 1 G: 1 INJECTION, SOLUTION INTRAVENOUS at 01:10

## 2018-10-30 NOTE — PROGRESS NOTES
Progress Note  Hospital Medicine  Patient Name:Bertha Kelly  MRN:  [id number]  Patient Class: IP- Inpatient  Admit Date: 10/26/2018  Length of Stay: 3 days  Expected Discharge Date:   Attending Physician: Vicki Suarez MD  Primary Care Provider:  James Colon MD    SUBJECTIVE:     Principal Problem: Acute respiratory failure with hypoxia  Initial history of present illness: Bertha Kelly is a 69 y/o female with a PMHx of HTN and thyroid disease who presented to the ED today with c/o SOB which began 3 weeks ago and has progressively gotten worse.  Pt states that she becomes SOB with activity.  She reports that she received a pneumonia vaccine at her doctor's office about a month ago and began having some chest congestion and cough a few days later.  She is a current smoker of about 1/2 pack daily.  Reports increased fatigue and chills, but denies fever, chest pain, N/V, and dysuria.  Reports weight loss of a few pounds in the past 3 weeks due to poor appetite, but states that prior to this illness, weight was stable and she had a very good appetite.  Pt's CXR reveals a LLL pneumonia and was noted to be mildly hypoxic initially requiring supplemental oxygen.  She will be admitted to the service of hospital medicine for further treatment.    PMH/PSH/SH/FH/Meds: reviewed.    Symptoms/Review of Systems: Afebrile. Feeling better after 1500 cc thoracentesis. Reports improving cough and SOB. No chest pain or headache, fever or abdominal pain.     Diet:  Adequate intake.    Activity level: Normal.    Pain:  Patient reports no pain.       OBJECTIVE:   Vital Signs (Most Recent):      Temp: 98.3 °F (36.8 °C) (10/30/18 0816)  Pulse: 69 (10/30/18 0816)  Resp: 18 (10/30/18 0816)  BP: (!) 114/58 (10/30/18 0816)  SpO2: (!) 92 % (10/30/18 0816)       Vital Signs Range (Last 24H):  Temp:  [97.6 °F (36.4 °C)-98.9 °F (37.2 °C)]   Pulse:  [60-84]   Resp:  [14-24]   BP: ()/(43-81)   SpO2:  [71 %-96 %]     Weight: 58.6 kg (129  lb 4 oz)  Body mass index is 22.9 kg/m².    Intake/Output Summary (Last 24 hours) at 10/30/2018 0929  Last data filed at 10/30/2018 0600  Gross per 24 hour   Intake 855 ml   Output 1100 ml   Net -245 ml     Physical Examination:  Constitutional: She is oriented to person, place, and time. She appears well-developed and well-nourished. No distress.   HENT:   Head: Normocephalic and atraumatic.   Eyes: Pupils are equal, round, and reactive to light.   Neck: Neck supple. No thyromegaly present.   Cardiovascular: Normal rate and regular rhythm. Exam reveals no gallop and no friction rub.   No murmur heard.  Pulmonary/Chest: No respiratory distress. She has wheezes.   Abdominal: Soft. Bowel sounds are normal. She exhibits no distension. There is no tenderness. There is no guarding.   Musculoskeletal: Normal range of motion. She exhibits no edema.   Neurological: She is alert and oriented to person, place, and time.   Skin: Skin is warm and dry. No erythema.   Psychiatric: She has a normal mood and affect.     CBC:  Recent Labs   Lab 10/28/18  0459 10/29/18  0413 10/30/18  0723   WBC 9.90 9.40 11.10   RBC 4.90 4.44 4.57   HGB 15.0 13.3 13.5   HCT 45.1 40.9 42.3    255 229   MCV 92 92 93   MCH 30.5 29.9 29.5   MCHC 33.2 32.5 31.9*   BMP  Recent Labs   Lab 10/27/18  0502 10/28/18  0459 10/29/18  0413   * 120* 131*    133* 136   K 3.7 4.5 4.2    99 99   CO2 25 26 28   BUN 19 14 8   CREATININE 0.9 0.9 0.8   CALCIUM 9.2 9.1 9.1   MG 1.7 1.8 1.9      Diagnostic Results:  Microbiology Results (last 7 days)     Procedure Component Value Units Date/Time    Culture, Respiratory with Gram Stain [[id number]] Collected:  10/29/18 1401    Order Status:  Completed Specimen:  Respiratory from Sputum, Expectorated Updated:  10/30/18 0220     Gram Stain (Respiratory) <10 epithelial cells per low power field.     Gram Stain (Respiratory) Few WBC's     Gram Stain (Respiratory) Moderate yeast     Gram Stain  (Respiratory) Rare Gram positive rods    Culture, Body Fluid (Aerobic) w/ GS [[id number]] Collected:  10/29/18 1815    Order Status:  Completed Specimen:  Body Fluid from Pleural Fluid Updated:  10/30/18 0020     Gram Stain Result Cytospin indicates:      Many WBC's      No organisms seen    Urine culture [[id number]] Collected:  10/27/18 2330    Order Status:  Completed Specimen:  Urine Updated:  10/29/18 1221     Urine Culture, Routine --     COAGULASE-NEGATIVE STAPHYLOCOCCUS SPECIES  10,000 - 49,999 cfu/ml  Susceptibility testing not routinely performed.  No other significant isolate      Narrative:       Preferred Collection Type->Urine, Clean Catch    Blood culture x two cultures. Draw prior to antibiotics. [[id number]] Collected:  10/26/18 2348    Order Status:  Completed Specimen:  Blood from Peripheral, Left  Arm Updated:  10/29/18 1212     Blood Culture, Routine No Growth to date     Blood Culture, Routine No Growth to date     Blood Culture, Routine No Growth to date    Narrative:       Aerobic and anaerobic    Blood culture x two cultures. Draw prior to antibiotics. [[id number]] Collected:  10/26/18 2348    Order Status:  Completed Specimen:  Blood from Peripheral, Right  Wrist Updated:  10/29/18 1212     Blood Culture, Routine No Growth to date     Blood Culture, Routine No Growth to date     Blood Culture, Routine No Growth to date    Narrative:       Aerobic and anaerobic         CXR: The cardiomediastinal silhouette is normal in appearance.  No pulmonary vascular congestion appreciated. There is dense airspace consolidative change present at the left lung base and there is patchy segmental airspace opacity in the left mid and upper lung zones.  Differential considerations include pneumonia and aspiration.  Underlying pleural fluid cannot be excluded.  An underlying mass would be difficult to exclude.  Serial radiography will be necessary to ensure resolution and exclude an underlying lesion.  There is an  age-indeterminate, possibly remote, right posterior 10th rib fracture.  Assessment/Plan:     * Acute respiratory failure with hypoxia  Large left para-pneumonic effusion s/p thoracentesis     Presented with increased SOB x 3 weeks.  Oxygen saturation noted at 89%, recovered well with supplemental oxygen via NC.  Likely due to pneumonia of LLL.  Pt is a daily smoker.  Likely has COPD, although she is not currently treated with chronic meds.  Continue supplemental oxygen as needed.  Nebulizer treatments   Monitor and treat as clinically indicated.  Follow CT chest and Dr. Abdi's recommendations.      Tobacco abuse     Smoking cessation counseling performed. Dangers of cigarette smoking were reviewed with patient in detail and patient was encouraged to quit.      Generalized anxiety disorder     Chronic; currently controlled.  Continue home regimen.      Essential hypertension     Chronic; stable.  Continue chronic meds.      Hypothyroidism due to Hashimoto's thyroiditis     Chronic; pt states controlled.  Continue levothyroxine.      Pneumonia of left lower lobe due to infectious organism     Presents with SOB; LLL pneumonia identified on CXR.    Initiate treatment regimen for CAP, which includes a beta lactam and macrolide.   IV Rocephin and IV azithromycin.  Nebulizer treatments     Discussed with patient's .   VTE Risk Mitigation (From admission, onward)        Ordered     enoxaparin injection 40 mg  Daily      10/29/18 1231     IP VTE HIGH RISK PATIENT  Once      10/27/18 0207     Place EVELYNE hose  Until discontinued      10/27/18 0207     Place sequential compression device  Until discontinued      10/27/18 0207        Vicki Suarez MD  Department of Hospital Medicine   [facility]

## 2018-10-30 NOTE — PLAN OF CARE
Problem: Physical Therapy Goal  Goal: Physical Therapy Goal  Goals to be met by: 2018     Patient will increase functional independence with mobility by performin. Supine to sit with Modified [place]  2. Sit to stand transfer with Contact Guard Assistance  3. Bed to chair transfer with Contact Guard Assistance using Rolling Walker  4. Gait  x 250 feet with Contact Guard Assistance using Rolling Walker.   5. Lower extremity exercise program x20 reps per handout, with assistance as needed     Outcome: Ongoing (interventions implemented as appropriate)  PT for bed mobility, transfer training, gait training and therex

## 2018-10-30 NOTE — PLAN OF CARE
Problem: Patient Care Overview  Goal: Plan of Care Review  Outcome: Ongoing (interventions implemented as appropriate)  Patient AAOx4 for majority of the time, if O2 comes off in sleep or speaking with pt after she awakes she gets slightly confused and takes a little while to refocus. 02 PRN @ 3L, O2 sats dropped when removed. All  Breath sounds diminished, pt winded when speaking, mumbles when speaking, hard to understand. Pt hypotensive beginning of shift, notified on call NP/Chelsea Suit. Held meds, NP aware. Scattered BUE bruising, pale. PIV flushed, CDI, no redness or edema noted, infusing IV abx as scheduled. POC reviewed with patient and son, open  Discussion facilitated, pt verbalized understanding. Comfort maintained throughout shift, mild complaints of pain, relief with PO PRN meds. Pt up to bedside commode, unable to urinate, bladder scan showed 400 mL, notified NP/Chelsea Suit, new orders for [place]. Insertions of [place], 1100 clear yellow, sediment UO. SCDs and TEDs in place. Telemetry monitoring. Hourly rounding per unit protocol. Patient has remained free of falls, trauma, and injury this shift. Bed locked, in lowest position, bed alarm on, SR up x 2, call light within reach. Will continue to monitor, POC in progress.

## 2018-10-30 NOTE — PROGRESS NOTES
Patient educated on smoking cessation trust patient signed up at this time patient smokes 2 packs a day

## 2018-10-30 NOTE — PT/OT/SLP PROGRESS
Physical Therapy Treatment    Patient Name:  Bertha Kelly   MRN:  [id number]    Recommendations:     Discharge Recommendations:  home health PT       Assessment:     Bertha Kelly is a 68 y.o. female admitted with a medical diagnosis of Acute respiratory failure with hypoxia.  She presents with the following impairments/functional limitations:  weakness, impaired endurance, impaired functional mobilty, gait instability, impaired balance, decreased safety awareness, impaired cardiopulmonary response to activity .    Rehab Prognosis:  good; patient would benefit from acute skilled PT services to address these deficits and reach maximum level of function.      Recent Surgery: * No surgery found *      Plan:     During this hospitalization, patient to be seen 6 x/week to address the above listed problems via gait training, therapeutic activities, therapeutic exercises  · Plan of Care Expires:  11/30/18   Plan of Care Reviewed with: patient, spouse    Subjective     Communicated with nurse Muñiz prior to session.  Patient found supine upon PT entry to room, agreeable to treatment.      Chief Complaint: fatigue  Patient comments/goals: wants to get well, but also wants to rest  Pain/Comfort:  · Pain Rating 1: (did not rate, reported pain likely 2' laying in bed)  · Location - Orientation 1: generalized  · Location 1: back  · Pain Addressed 1: Reposition, Distraction, Cessation of Activity    Patients cultural, spiritual, [employer]ian conflicts given the current situation:      Objective:     Patient found with: oxygen, SCD, telemetry, bed alarm     General Precautions: Standard, fall, respiratory   Orthopedic Precautions:N/A   Braces: N/A     Functional Mobility:  · Bed Mobility:     · Scooting: contact guard assistance  · Supine to Sit: minimum assistance    · Transfers:     · Sit to Stand:  minimum assistance with rolling walker  · Bed to Chair: minimum assistance with  rolling walker  using  Stand Pivot and cueing  for tech/safety    · Gait: 65' with CGA-Min A using RW. Pt ambulated on RA with no c/o SOB. Notable mild LOB posteriorly but pt able to recover with min A      Therapeutic Activities and Exercises:   pt assisted to bedside chair post gait. O2 donned    Seated AP, LAQ. Pt instructed to perform throughout the day.      Patient left up in chair with all lines intact, call button in reach and spouse present and nursing notified..    GOALS:   Multidisciplinary Problems     Physical Therapy Goals        Problem: Physical Therapy Goal    Goal Priority Disciplines Outcome Goal Variances Interventions   Physical Therapy Goal     PT, PT/OT Ongoing (interventions implemented as appropriate)     Description:  Goals to be met by: 2018     Patient will increase functional independence with mobility by performin. Supine to sit with Modified [place]  2. Sit to stand transfer with Contact Guard Assistance  3. Bed to chair transfer with Contact Guard Assistance using Rolling Walker  4. Gait  x 250 feet with Contact Guard Assistance using Rolling Walker.   5. Lower extremity exercise program x20 reps per handout, with assistance as needed                      Time Tracking:     PT Received On: 10/30/18  PT Start Time: 1437     PT Stop Time: 1456  PT Total Time (min): 19 min     Billable Minutes: Gait Training 9 and Therapeutic Activity 10    Treatment Type: Treatment  PT/PTA: PTA     PTA Visit Number: 1     Kyra Houston, CE  10/30/2018

## 2018-10-30 NOTE — PROGRESS NOTES
Progress Note  PULMONARY    Admit Date: 10/26/2018   10/30/2018      SUBJECTIVE:     Oct 30, breathing better, some appetite.      PFSH and Allergies reviewed.    OBJECTIVE:     Vitals (Most recent):  Vitals:    10/30/18 1600   BP:    Pulse: 67   Resp:    Temp:        Vitals (24 hour range):  Temp:  [97.6 °F (36.4 °C)-99.5 °F (37.5 °C)]   Pulse:  [60-84]   Resp:  [14-20]   BP: ()/(43-65)   SpO2:  [71 %-95 %]       Intake/Output Summary (Last 24 hours) at 10/30/2018 1800  Last data filed at 10/30/2018 0600  Gross per 24 hour   Intake 375 ml   Output 1100 ml   Net -725 ml          Physical Exam:  The patient's neuro status (alertness,orientation,cognitive function,motor skills,), pharyngeal exam (oral lesions, hygiene, abn dentition,), Neck (jvd,mass,thyroid,nodes in neck and above/below clavicle),RESPIRATORY(symmetry,effort,fremitus,percussion,auscultation),  Cor(rhythm,heart tones including gallops,perfusion,edema)ABD(distention,hepatic&splenomegaly,tenderness,masses), Skin(rash,cyanosis),Psyc(affect,judgement,).  Exam negative except for these pertinent findings:    Left dullness nearly 2/3up now, good bs, no edema nor distress    Radiographs reviewed: view by direct vision  Ct bilat effusion, dense left lower lung.    Results for orders placed during the hospital encounter of 10/26/18   X-Ray Chest 1 View    Narrative EXAMINATION:  XR CHEST 1 VIEW    CLINICAL HISTORY:  post thoracentesis;    TECHNIQUE:  Single frontal view of the chest was performed.    COMPARISON:  Chest radiograph 10/29/2018; CT dated 10/30/2018    FINDINGS:  Bibasilar airspace disease.  Normal size heart. Indistinct pulmonary vasculature. Blunted costophrenic angles.  Left apical pneumothorax.      Impression 1. Post thoracentesis with reduction in size of left pleural effusion and development of apical pneumothorax.  This is more conspicuous on subsequent CT.  2. Small right pleural effusion.  3. Bibasilar airspace disease which could  reflect atelectasis, pneumonia, edema.      Electronically signed by: Demar Ann  Date:    10/30/2018  Time:    10:33   ]    Labs     Recent Labs   Lab 10/30/18  0723   WBC 11.10   HGB 13.5   HCT 42.3        Recent Labs   Lab 10/30/18  0723      K 3.8   CL 95   CO2 36*   BUN 8   CREATININE 0.7   GLU 88   CALCIUM 9.1   MG 1.7   PHOS 2.8   AST 44*   ALT 34   ALKPHOS 80   BILITOT 0.2   PROT 5.8*   ALBUMIN 2.6*   No results for input(s): PH, PCO2, PO2, HCO3 in the last 24 hours.  Microbiology Results (last 7 days)     Procedure Component Value Units Date/Time    Blood culture x two cultures. Draw prior to antibiotics. [[id number]] Collected:  10/26/18 2348    Order Status:  Completed Specimen:  Blood from Peripheral, Left  Arm Updated:  10/30/18 1212     Blood Culture, Routine No Growth to date     Blood Culture, Routine No Growth to date     Blood Culture, Routine No Growth to date     Blood Culture, Routine No Growth to date    Narrative:       Aerobic and anaerobic    Blood culture x two cultures. Draw prior to antibiotics. [[id number]] Collected:  10/26/18 2348    Order Status:  Completed Specimen:  Blood from Peripheral, Right  Wrist Updated:  10/30/18 1212     Blood Culture, Routine No Growth to date     Blood Culture, Routine No Growth to date     Blood Culture, Routine No Growth to date     Blood Culture, Routine No Growth to date    Narrative:       Aerobic and anaerobic    Culture, Respiratory with Gram Stain [[id number]] Collected:  10/29/18 1401    Order Status:  Completed Specimen:  Respiratory from Sputum, Expectorated Updated:  10/30/18 0220     Gram Stain (Respiratory) <10 epithelial cells per low power field.     Gram Stain (Respiratory) Few WBC's     Gram Stain (Respiratory) Moderate yeast     Gram Stain (Respiratory) Rare Gram positive rods    Culture, Body Fluid (Aerobic) w/ GS [[id number]] Collected:  10/29/18 1815    Order Status:  Completed Specimen:  Body Fluid from Pleural Fluid  Updated:  10/30/18 0020     Gram Stain Result Cytospin indicates:      Many WBC's      No organisms seen    Urine culture [[id number]] Collected:  10/27/18 2330    Order Status:  Completed Specimen:  Urine Updated:  10/29/18 1221     Urine Culture, Routine --     COAGULASE-NEGATIVE STAPHYLOCOCCUS SPECIES  10,000 - 49,999 cfu/ml  Susceptibility testing not routinely performed.  No other significant isolate      Narrative:       Preferred Collection Type->Urine, Clean Catch          Impression:  Active Hospital Problems    Diagnosis  POA    *Acute respiratory failure with hypoxia [J96.01]  Yes    Pleural effusion [J90]  Yes    Wheeze [R06.2]  Yes    Anorexia [R63.0]  Yes    SOB (shortness of breath) [R06.02]  Yes    Pneumonia of left lower lobe due to infectious organism [J18.1]  Yes    Hypothyroidism due to Hashimoto's thyroiditis [E03.8, E06.3]  Yes    Essential hypertension [I10]  Yes    Generalized anxiety disorder [F41.1]  Yes    Tobacco abuse [Z72.0]  Yes      Resolved Hospital Problems   No resolved problems to display.               Plan:     Oct 30, fluid was lymph predominate exudate.  Cancer concerning.  F/u cxr am - if fluid building up would recommend thoracoscopic procedure with Dr Jones.  Cytology pending.  procalcitonin was very low.  Check d dimer and bnp am.  Discussed with pt and family.                                    .

## 2018-10-31 LAB
ALBUMIN SERPL BCP-MCNC: 2.5 G/DL
ALP SERPL-CCNC: 85 U/L
ALT SERPL W/O P-5'-P-CCNC: 59 U/L
ANION GAP SERPL CALC-SCNC: 9 MMOL/L
AST SERPL-CCNC: 60 U/L
BASOPHILS # BLD AUTO: 0.1 K/UL
BASOPHILS NFR BLD: 0.7 %
BILIRUB SERPL-MCNC: 0.3 MG/DL
BNP SERPL-MCNC: 287 PG/ML
BUN SERPL-MCNC: 6 MG/DL
CALCIUM SERPL-MCNC: 9.3 MG/DL
CHLORIDE SERPL-SCNC: 95 MMOL/L
CO2 SERPL-SCNC: 33 MMOL/L
CREAT SERPL-MCNC: 0.8 MG/DL
D DIMER PPP IA.FEU-MCNC: 0.39 MG/L FEU
DIFFERENTIAL METHOD: ABNORMAL
EOSINOPHIL # BLD AUTO: 0.3 K/UL
EOSINOPHIL NFR BLD: 3 %
ERYTHROCYTE [DISTWIDTH] IN BLOOD BY AUTOMATED COUNT: 13.7 %
EST. GFR  (AFRICAN AMERICAN): >60 ML/MIN/1.73 M^2
EST. GFR  (NON AFRICAN AMERICAN): >60 ML/MIN/1.73 M^2
GLUCOSE SERPL-MCNC: 99 MG/DL
HCT VFR BLD AUTO: 42.4 %
HGB BLD-MCNC: 13.8 G/DL
LYMPHOCYTES # BLD AUTO: 1.5 K/UL
LYMPHOCYTES NFR BLD: 15.4 %
MAGNESIUM SERPL-MCNC: 1.8 MG/DL
MCH RBC QN AUTO: 29.5 PG
MCHC RBC AUTO-ENTMCNC: 32.5 G/DL
MCV RBC AUTO: 91 FL
MONOCYTES # BLD AUTO: 1 K/UL
MONOCYTES NFR BLD: 10.4 %
NEUTROPHILS # BLD AUTO: 6.8 K/UL
NEUTROPHILS NFR BLD: 70.5 %
PHOSPHATE SERPL-MCNC: 3 MG/DL
PLATELET # BLD AUTO: 210 K/UL
PMV BLD AUTO: 9.1 FL
POTASSIUM SERPL-SCNC: 4.3 MMOL/L
PROT SERPL-MCNC: 6.1 G/DL
RBC # BLD AUTO: 4.67 M/UL
SODIUM SERPL-SCNC: 137 MMOL/L
WBC # BLD AUTO: 9.6 K/UL

## 2018-10-31 PROCEDURE — 25000003 PHARM REV CODE 250: Performed by: INTERNAL MEDICINE

## 2018-10-31 PROCEDURE — 85025 COMPLETE CBC W/AUTO DIFF WBC: CPT

## 2018-10-31 PROCEDURE — 94640 AIRWAY INHALATION TREATMENT: CPT

## 2018-10-31 PROCEDURE — 85379 FIBRIN DEGRADATION QUANT: CPT

## 2018-10-31 PROCEDURE — 84100 ASSAY OF PHOSPHORUS: CPT

## 2018-10-31 PROCEDURE — 63600175 PHARM REV CODE 636 W HCPCS: Performed by: INTERNAL MEDICINE

## 2018-10-31 PROCEDURE — 94664 DEMO&/EVAL PT USE INHALER: CPT

## 2018-10-31 PROCEDURE — 63600175 PHARM REV CODE 636 W HCPCS: Performed by: NURSE PRACTITIONER

## 2018-10-31 PROCEDURE — 94761 N-INVAS EAR/PLS OXIMETRY MLT: CPT

## 2018-10-31 PROCEDURE — 99232 SBSQ HOSP IP/OBS MODERATE 35: CPT | Mod: ,,, | Performed by: INTERNAL MEDICINE

## 2018-10-31 PROCEDURE — 36415 COLL VENOUS BLD VENIPUNCTURE: CPT

## 2018-10-31 PROCEDURE — 25500020 PHARM REV CODE 255: Performed by: SPECIALIST

## 2018-10-31 PROCEDURE — 25000003 PHARM REV CODE 250: Performed by: NURSE PRACTITIONER

## 2018-10-31 PROCEDURE — 80053 COMPREHEN METABOLIC PANEL: CPT

## 2018-10-31 PROCEDURE — 25000242 PHARM REV CODE 250 ALT 637 W/ HCPCS: Performed by: INTERNAL MEDICINE

## 2018-10-31 PROCEDURE — 83735 ASSAY OF MAGNESIUM: CPT

## 2018-10-31 PROCEDURE — 83880 ASSAY OF NATRIURETIC PEPTIDE: CPT

## 2018-10-31 PROCEDURE — 12000002 HC ACUTE/MED SURGE SEMI-PRIVATE ROOM

## 2018-10-31 PROCEDURE — 99900035 HC TECH TIME PER 15 MIN (STAT)

## 2018-10-31 PROCEDURE — 27000221 HC OXYGEN, UP TO 24 HOURS

## 2018-10-31 RX ORDER — FUROSEMIDE 10 MG/ML
20 INJECTION INTRAMUSCULAR; INTRAVENOUS ONCE
Status: COMPLETED | OUTPATIENT
Start: 2018-10-31 | End: 2018-10-31

## 2018-10-31 RX ORDER — HYDROCODONE BITARTRATE AND ACETAMINOPHEN 7.5; 325 MG/1; MG/1
1 TABLET ORAL ONCE
Status: COMPLETED | OUTPATIENT
Start: 2018-10-31 | End: 2018-10-31

## 2018-10-31 RX ADMIN — IPRATROPIUM BROMIDE AND ALBUTEROL SULFATE 3 ML: .5; 3 SOLUTION RESPIRATORY (INHALATION) at 11:10

## 2018-10-31 RX ADMIN — IPRATROPIUM BROMIDE AND ALBUTEROL SULFATE 3 ML: .5; 3 SOLUTION RESPIRATORY (INHALATION) at 07:10

## 2018-10-31 RX ADMIN — METOPROLOL TARTRATE 50 MG: 50 TABLET ORAL at 08:10

## 2018-10-31 RX ADMIN — ENOXAPARIN SODIUM 40 MG: 100 INJECTION SUBCUTANEOUS at 09:10

## 2018-10-31 RX ADMIN — KETOROLAC TROMETHAMINE 15 MG: 30 INJECTION, SOLUTION INTRAMUSCULAR at 12:10

## 2018-10-31 RX ADMIN — FUROSEMIDE: 10 INJECTION, SOLUTION INTRAMUSCULAR; INTRAVENOUS at 02:10

## 2018-10-31 RX ADMIN — ACETAMINOPHEN 650 MG: 325 TABLET, FILM COATED ORAL at 08:10

## 2018-10-31 RX ADMIN — LEVOTHYROXINE SODIUM 50 MCG: 50 TABLET ORAL at 05:10

## 2018-10-31 RX ADMIN — CEFTRIAXONE 1 G: 1 INJECTION, SOLUTION INTRAVENOUS at 12:10

## 2018-10-31 RX ADMIN — SULFUR HEXAFLUORIDE 2.4 ML: KIT at 02:10

## 2018-10-31 RX ADMIN — METOPROLOL TARTRATE 50 MG: 50 TABLET ORAL at 09:10

## 2018-10-31 RX ADMIN — IPRATROPIUM BROMIDE AND ALBUTEROL SULFATE 3 ML: .5; 3 SOLUTION RESPIRATORY (INHALATION) at 03:10

## 2018-10-31 RX ADMIN — PANTOPRAZOLE SODIUM 40 MG: 40 TABLET, DELAYED RELEASE ORAL at 08:10

## 2018-10-31 RX ADMIN — AMITRIPTYLINE HYDROCHLORIDE 25 MG: 25 TABLET, FILM COATED ORAL at 09:10

## 2018-10-31 RX ADMIN — HYDROCODONE BITARTRATE AND ACETAMINOPHEN 1 TABLET: 7.5; 325 TABLET ORAL at 10:10

## 2018-10-31 RX ADMIN — IPRATROPIUM BROMIDE AND ALBUTEROL SULFATE 3 ML: .5; 3 SOLUTION RESPIRATORY (INHALATION) at 12:10

## 2018-10-31 RX ADMIN — ESCITALOPRAM 20 MG: 10 TABLET, FILM COATED ORAL at 08:10

## 2018-10-31 NOTE — PT/OT/SLP PROGRESS
Physical Therapy      Patient Name:  Bertha Kelly   MRN:  [id number]    Patient not seen today secondary to Patient fatigue (sleeping for 1st attempt, spouse present requesting to let pt rest), Nursing care, Unavailable (getting echo). Will follow-up .    Kyra Houston, PTA

## 2018-10-31 NOTE — PT/OT/SLP PROGRESS
Occupational Therapy      Patient Name:  Bertha Kelly   MRN:  [id number]    Patient not seen today secondary to Patient fatigue, Patient unwilling to participate(Family requested OT wait till tomorrow and pt was asleep and then later pt requested to wait until tomorrow for OT evaluation.). Will follow-up 11/1/18.    ROLANDO Alvarado  10/31/2018

## 2018-10-31 NOTE — PROGRESS NOTES
10/31/18 0724   Patient Assessment/Suction   Level of Consciousness (AVPU) alert   Respiratory Effort Normal;Unlabored   All Lung Fields Breath Sounds diminished   PRE-TX-O2-ETCO2   O2 Device (Oxygen Therapy) nasal cannula w/ humidification   Flow (L/min) 4   SpO2 97 %   Pulse Oximetry Type Intermittent   Pulse 79   Resp 18   Aerosol Therapy   $ Aerosol Therapy Charges Aerosol Treatment   Respiratory Treatment Status given   SVN/Inhaler Treatment Route mask   Position During Treatment HOB at 90 degrees   Patient Tolerance good   Post-Treatment   Post-treatment Heart Rate (beats/min) 80   Post-treatment Resp Rate (breaths/min) 18   All Fields Breath Sounds aeration increased   Vibratory PEP Therapy   $ Vibratory PEP Charges Aerobika Therapy   $ Vibratory PEP Tech Time Charges 15 min   Type Oscillating PEP Therapy   Administration done with encouragement   Number of Repetitions 10   Duo Q8, Acapella Q4w/a, NC 4L, vitals as charted, tolerated tx well.

## 2018-10-31 NOTE — PROGRESS NOTES
Progress Note  PULMONARY    Admit Date: 10/26/2018   10/31/2018      SUBJECTIVE:     Oct 30, breathing better, some appetite.  Oct 31, no c/o, tried pull out lines last pm, denies sob.        PFSH and Allergies reviewed.    OBJECTIVE:     Vitals (Most recent):  Vitals:    10/31/18 1130   BP: (!) 101/52   Pulse: 61   Resp: 18   Temp: 97.6 °F (36.4 °C)       Vitals (24 hour range):  Temp:  [9.3 °F (-12.6 °C)-99.7 °F (37.6 °C)]   Pulse:  [61-88]   Resp:  [14-20]   BP: (101-128)/(52-65)   SpO2:  [91 %-97 %]       Intake/Output Summary (Last 24 hours) at 10/31/2018 1217  Last data filed at 10/31/2018 0557  Gross per 24 hour   Intake 590 ml   Output 1225 ml   Net -635 ml          Physical Exam:  The patient's neuro status (alertness,orientation,cognitive function,motor skills,), pharyngeal exam (oral lesions, hygiene, abn dentition,), Neck (jvd,mass,thyroid,nodes in neck and above/below clavicle),RESPIRATORY(symmetry,effort,fremitus,percussion,auscultation),  Cor(rhythm,heart tones including gallops,perfusion,edema)ABD(distention,hepatic&splenomegaly,tenderness,masses), Skin(rash,cyanosis),Psyc(affect,judgement,).  Exam negative except for these pertinent findings:    Left dullness  2/3up now, good bs, no edema nor distress    Radiographs reviewed: view by direct vision  Ct bilat effusion, dense left lower lung.  cxr looks like approaching pre tap left effusion    Results for orders placed during the hospital encounter of 10/26/18   X-Ray Chest 1 View    Narrative EXAMINATION:  XR CHEST 1 VIEW    CLINICAL HISTORY:  post thoracentesis;    TECHNIQUE:  Single frontal view of the chest was performed.    COMPARISON:  Chest radiograph 10/29/2018; CT dated 10/30/2018    FINDINGS:  Bibasilar airspace disease.  Normal size heart. Indistinct pulmonary vasculature. Blunted costophrenic angles.  Left apical pneumothorax.      Impression 1. Post thoracentesis with reduction in size of left pleural effusion and development of apical  pneumothorax.  This is more conspicuous on subsequent CT.  2. Small right pleural effusion.  3. Bibasilar airspace disease which could reflect atelectasis, pneumonia, edema.      Electronically signed by: Demar Ann  Date:    10/30/2018  Time:    10:33   ]    Labs     Recent Labs   Lab 10/31/18  0609   WBC 9.60   HGB 13.8   HCT 42.4      Electronically reviewed and signed by:   Ursula Chow MD   Signed on 10/30/18 at 13:20   Pathologist review of pleural fluid cell count:   The cytospin shows a predominance of mature appearing lymphocytes   with rare neutrophils and eosinophils seen in the background as well   as an occasional atypical mesothelial cell, favor reactive.     Correlate clinically.  Recent Labs   Lab 10/31/18  0609      K 4.3   CL 95   CO2 33*   BUN 6*   CREATININE 0.8   GLU 99   CALCIUM 9.3   MG 1.8   PHOS 3.0   AST 60*   ALT 59*   ALKPHOS 85   BILITOT 0.3   PROT 6.1   ALBUMIN 2.5*   *   No results for input(s): PH, PCO2, PO2, HCO3 in the last 24 hours.  Microbiology Results (last 7 days)     Procedure Component Value Units Date/Time    Blood culture x two cultures. Draw prior to antibiotics. [[id number]] Collected:  10/26/18 2348    Order Status:  Completed Specimen:  Blood from Peripheral, Left  Arm Updated:  10/31/18 1212     Blood Culture, Routine No Growth to date     Blood Culture, Routine No Growth to date     Blood Culture, Routine No Growth to date     Blood Culture, Routine No Growth to date     Blood Culture, Routine No Growth to date    Narrative:       Aerobic and anaerobic    Blood culture x two cultures. Draw prior to antibiotics. [[id number]] Collected:  10/26/18 2348    Order Status:  Completed Specimen:  Blood from Peripheral, Right  Wrist Updated:  10/31/18 1212     Blood Culture, Routine No Growth to date     Blood Culture, Routine No Growth to date     Blood Culture, Routine No Growth to date     Blood Culture, Routine No Growth to date     Blood  Culture, Routine No Growth to date    Narrative:       Aerobic and anaerobic    Culture, Respiratory with Gram Stain [[id number]] Collected:  10/29/18 1401    Order Status:  Completed Specimen:  Respiratory from Sputum, Expectorated Updated:  10/31/18 1116     Respiratory Culture --     YEAST   Moderate  Identification pending       Respiratory Culture --     GRAM NEGATIVE [place]  Rare  Identification and susceptibility pending       Gram Stain (Respiratory) <10 epithelial cells per low power field.     Gram Stain (Respiratory) Few WBC's     Gram Stain (Respiratory) Moderate yeast     Gram Stain (Respiratory) Rare Gram positive rods    Culture, Body Fluid (Aerobic) w/ GS [[id number]] Collected:  10/29/18 1815    Order Status:  Completed Specimen:  Body Fluid from Pleural Fluid Updated:  10/31/18 0730     AEROBIC CULTURE - FLUID No growth     Gram Stain Result Cytospin indicates:      Many WBC's      No organisms seen    Urine culture [[id number]] Collected:  10/27/18 2330    Order Status:  Completed Specimen:  Urine Updated:  10/29/18 1221     Urine Culture, Routine --     COAGULASE-NEGATIVE STAPHYLOCOCCUS SPECIES  10,000 - 49,999 cfu/ml  Susceptibility testing not routinely performed.  No other significant isolate      Narrative:       Preferred Collection Type->Urine, Clean Catch          Impression:  Active Hospital Problems    Diagnosis  POA    *Acute respiratory failure with hypoxia [J96.01]  Yes    Pleural effusion [J90]  Yes    Wheeze [R06.2]  Yes    Anorexia [R63.0]  Yes    SOB (shortness of breath) [R06.02]  Yes    Pneumonia of left lower lobe due to infectious organism [J18.1]  Yes    Hypothyroidism due to Hashimoto's thyroiditis [E03.8, E06.3]  Yes    Essential hypertension [I10]  Yes    Generalized anxiety disorder [F41.1]  Yes    Tobacco abuse [Z72.0]  Yes      Resolved Hospital Problems   No resolved problems to display.               Plan:     Oct 30, fluid was lymph predominate exudate.  Cancer  concerning.  F/u cxr am - if fluid building up would recommend thoracoscopic procedure with Dr Jones.  Cytology pending.  procalcitonin was very low.  Check d dimer and bnp am.  Discussed with pt and family.  Oct 31, bnp up, cytospin cells not felt malignant - cytology pending.  D dimer good.  cxr left effusion increasing. Dose lasix, check echo.  May need Dr Jones for procedure?  F/u cxr am to decide.                                      .

## 2018-10-31 NOTE — PLAN OF CARE
Problem: Patient Care Overview  Goal: Plan of Care Review  Outcome: Ongoing (interventions implemented as appropriate)  Pt has remained free from injury this shift, she has avasys at bedside for added safety and family stays with her most of the time.   Pt is AAO to self and place.  RT giving treatments as needed.  No elevated temps today, labs are being monitored for changes.  She is assisted with adl's and repositioned for safety.  Encouraged to call for assistance or needs and call light is in reach.

## 2018-10-31 NOTE — PLAN OF CARE
Problem: Patient Care Overview  Goal: Plan of Care Review  Outcome: Revised  Awake, alert, and oriented. De Anda catheter patent, draining clear, yellow urine. PIV clean dry and intact. VS stable. Remains afebrile throughout shift. Comfort level established. Patient ambulated w/ PT today; 30 minutes up in chair. Telemetry monitored. Bed in low position, brakes locked, side rails up x 2, call light w/in reach. Verbalized understanding of POC. Open communication facilitated. Will continue to monitor.

## 2018-10-31 NOTE — PLAN OF CARE
10/31/18 0006   Patient Assessment/Suction   Level of Consciousness (AVPU) alert   Respiratory Effort Normal;Unlabored   Expansion/Accessory Muscles/Retractions no use of accessory muscles   All Lung Fields Breath Sounds diminished   Rhythm/Pattern, Respiratory depth regular;pattern regular;unlabored   PRE-TX-O2-ETCO2   O2 Device (Oxygen Therapy) nasal cannula   $ Is the patient on Low Flow Oxygen? Yes   Flow (L/min) 4   SpO2 (!) 94 %   Pulse Oximetry Type Intermittent   $ Pulse Oximetry - Multiple Charge Pulse Oximetry - Multiple   Pulse 73   Resp 18   Aerosol Therapy   $ Aerosol Therapy Charges Aerosol Treatment   Respiratory Treatment Status given   SVN/Inhaler Treatment Route mask   Position During Treatment HOB at 45 degrees   Patient Tolerance good   Post-Treatment   Post-treatment Heart Rate (beats/min) 78   Post-treatment Resp Rate (breaths/min) 20   All Fields Breath Sounds aeration increased   Vibratory PEP Therapy   $ Vibratory PEP Charges Aerobika Therapy   $ Vibratory PEP Tech Time Charges 15 min   Type Oscillating PEP Therapy   Administration done with encouragement   Number of Repetitions 10

## 2018-10-31 NOTE — PLAN OF CARE
Problem: Patient Care Overview  Goal: Plan of Care Review  Outcome: Ongoing (interventions implemented as appropriate)  Patient AAOx4 for majority of the time, if O2 comes off in sleep or speaking with pt after she awakes she gets slightly confused and takes a little while to refocus. 02 PRN @ 4L, O2 sats dropped when removed. All  Breath sounds diminished with inspiratory and expiratory breathing, pt winded when speaking, mumbles when speaking, hard to understand. Breathing treatments received, and acapella breathing technique performed, cough is weak and loose, nonproductive.  Pt hypotensive, held BP med. Scattered BUE bruising, pale. New PIV inserted, CDI, no redness or edema noted, infusing IV abx as scheduled. POC reviewed with patient and daughter, open  Discussion facilitated, pt verbalized understanding. Comfort maintained throughout shift, complaints of pain, relief with PRN meds. De Anda draining, clear yellow UO. SCDs and TEDs in place, removed/replaced. Telemetry monitoring-NSR. Hourly rounding per unit protocol. Patient has remained free of falls, trauma, and injury this shift. Bed locked, in lowest position, bed alarm on, SR up x 2, call light within reach. Will continue to monitor, POC in progress.

## 2018-10-31 NOTE — PROGRESS NOTES
Progress Note  Hospital Medicine  Patient Name:Bertha Kelly  MRN:  [id number]  Patient Class: IP- Inpatient  Admit Date: 10/26/2018  Length of Stay: 4 days  Expected Discharge Date:   Attending Physician: Vicki Suarez MD  Primary Care Provider:  James Colon MD    SUBJECTIVE:     Principal Problem: Acute respiratory failure with hypoxia  Initial history of present illness: Bertha Kelly is a 69 y/o female with a PMHx of HTN and thyroid disease who presented to the ED today with c/o SOB which began 3 weeks ago and has progressively gotten worse.  Pt states that she becomes SOB with activity.  She reports that she received a pneumonia vaccine at her doctor's office about a month ago and began having some chest congestion and cough a few days later.  She is a current smoker of about 1/2 pack daily.  Reports increased fatigue and chills, but denies fever, chest pain, N/V, and dysuria.  Reports weight loss of a few pounds in the past 3 weeks due to poor appetite, but states that prior to this illness, weight was stable and she had a very good appetite.  Pt's CXR reveals a LLL pneumonia and was noted to be mildly hypoxic initially requiring supplemental oxygen.  She will be admitted to the service of hospital medicine for further treatment.    PMH/PSH/SH/FH/Meds: reviewed.    Symptoms/Review of Systems: Afebrile. Feeling better after 1500 cc thoracentesis. Reports improving cough and SOB. No chest pain or headache, fever or abdominal pain.     Diet:  Adequate intake.    Activity level: Normal.    Pain:  Patient reports no pain.       OBJECTIVE:   Vital Signs (Most Recent):      Temp: 99.7 °F (37.6 °C) (10/31/18 0837)  Pulse: 88 (10/31/18 0759)  Resp: 18 (10/31/18 0759)  BP: (!) 128/59 (10/31/18 0759)  SpO2: (!) 91 % (10/31/18 0759)       Vital Signs Range (Last 24H):  Temp:  [9.3 °F (-12.6 °C)-99.7 °F (37.6 °C)]   Pulse:  [67-88]   Resp:  [14-20]   BP: (104-128)/(52-65)   SpO2:  [91 %-97 %]     Weight: 59 kg (130  lb 1 oz)  Body mass index is 23.04 kg/m².    Intake/Output Summary (Last 24 hours) at 10/31/2018 0953  Last data filed at 10/31/2018 0557  Gross per 24 hour   Intake 980 ml   Output 1225 ml   Net -245 ml     Physical Examination:  Constitutional: She is oriented to person, place, and time. She appears well-developed and well-nourished. No distress.   HENT:   Head: Normocephalic and atraumatic.   Eyes: Pupils are equal, round, and reactive to light.   Neck: Neck supple. No thyromegaly present.   Cardiovascular: Normal rate and regular rhythm. Exam reveals no gallop and no friction rub.   No murmur heard.  Pulmonary/Chest: No respiratory distress. She has wheezes.   Abdominal: Soft. Bowel sounds are normal. She exhibits no distension. There is no tenderness. There is no guarding.   Musculoskeletal: Normal range of motion. She exhibits no edema.   Neurological: She is alert and oriented to person, place, and time.   Skin: Skin is warm and dry. No erythema.   Psychiatric: She has a normal mood and affect.     CBC:  Recent Labs   Lab 10/29/18  0413 10/30/18  0723 10/31/18  0609   WBC 9.40 11.10 9.60   RBC 4.44 4.57 4.67   HGB 13.3 13.5 13.8   HCT 40.9 42.3 42.4    229 210   MCV 92 93 91   MCH 29.9 29.5 29.5   MCHC 32.5 31.9* 32.5   BMP  Recent Labs   Lab 10/29/18  0413 10/30/18  0723 10/31/18  0609   * 88 99    137 137   K 4.2 3.8 4.3   CL 99 95 95   CO2 28 36* 33*   BUN 8 8 6*   CREATININE 0.8 0.7 0.8   CALCIUM 9.1 9.1 9.3   MG 1.9 1.7 1.8      Diagnostic Results:  Microbiology Results (last 7 days)     Procedure Component Value Units Date/Time    Culture, Body Fluid (Aerobic) w/ GS [[id number]] Collected:  10/29/18 1815    Order Status:  Completed Specimen:  Body Fluid from Pleural Fluid Updated:  10/31/18 0730     AEROBIC CULTURE - FLUID No growth     Gram Stain Result Cytospin indicates:      Many WBC's      No organisms seen    Blood culture x two cultures. Draw prior to antibiotics. [[id number]]  Collected:  10/26/18 2348    Order Status:  Completed Specimen:  Blood from Peripheral, Left  Arm Updated:  10/30/18 1212     Blood Culture, Routine No Growth to date     Blood Culture, Routine No Growth to date     Blood Culture, Routine No Growth to date     Blood Culture, Routine No Growth to date    Narrative:       Aerobic and anaerobic    Blood culture x two cultures. Draw prior to antibiotics. [[id number]] Collected:  10/26/18 2348    Order Status:  Completed Specimen:  Blood from Peripheral, Right  Wrist Updated:  10/30/18 1212     Blood Culture, Routine No Growth to date     Blood Culture, Routine No Growth to date     Blood Culture, Routine No Growth to date     Blood Culture, Routine No Growth to date    Narrative:       Aerobic and anaerobic    Culture, Respiratory with Gram Stain [[id number]] Collected:  10/29/18 1401    Order Status:  Completed Specimen:  Respiratory from Sputum, Expectorated Updated:  10/30/18 0220     Gram Stain (Respiratory) <10 epithelial cells per low power field.     Gram Stain (Respiratory) Few WBC's     Gram Stain (Respiratory) Moderate yeast     Gram Stain (Respiratory) Rare Gram positive rods    Urine culture [[id number]] Collected:  10/27/18 2330    Order Status:  Completed Specimen:  Urine Updated:  10/29/18 1221     Urine Culture, Routine --     COAGULASE-NEGATIVE STAPHYLOCOCCUS SPECIES  10,000 - 49,999 cfu/ml  Susceptibility testing not routinely performed.  No other significant isolate      Narrative:       Preferred Collection Type->Urine, Clean Catch         CXR: The cardiomediastinal silhouette is normal in appearance.  No pulmonary vascular congestion appreciated. There is dense airspace consolidative change present at the left lung base and there is patchy segmental airspace opacity in the left mid and upper lung zones.  Differential considerations include pneumonia and aspiration.  Underlying pleural fluid cannot be excluded.  An underlying mass would be difficult to  exclude.  Serial radiography will be necessary to ensure resolution and exclude an underlying lesion.  There is an age-indeterminate, possibly remote, right posterior 10th rib fracture.    CXR:  Unchanged small left apical pneumothorax. Unchanged moderate left and small right pleural effusions and multifocal airspace disease.    CT chest:  Small left pneumothorax status post preceding left thoracentesis.  Less than 10%.  Moderate left and small right pleural effusions and moderate left lung and mild right lower lobe consolidation/atelectasis.  Enlarged prevascular mediastinal lymph node.  Additional nodular opacities at the cardiac apex which may represent additional prominent lymph nodes, or possibly pleural based nodules.  Moderate emphysema.  A distinct lung mass is not visible however follow-up to resolution of the pulmonary consolidation is recommended to evaluate for on the underlying lung mass, considering the enlarged mediastinal lymph node.  Further evaluation with CT chest with contrast may also be useful if the patient can receive IV contrast. 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    Assessment/Plan:     * Acute respiratory failure with hypoxia  Large left para-pneumonic effusion s/p thoracentesis     Presented with increased SOB x 3 weeks.  Oxygen saturation noted at 89%, recovered well with supplemental oxygen via NC.  Likely due to pneumonia of LLL.  Pt is a daily smoker.  Likely has COPD, although she is not currently treated with chronic meds.  Continue supplemental oxygen as needed.  Nebulizer treatments   Monitor and treat as clinically indicated.  Follow Dr. Abdi's recommendations. Obtain 2 D ECHO and consult cardiologist for CHF evaluation.      Tobacco abuse     Smoking cessation counseling performed. Dangers of cigarette smoking were  reviewed with patient in detail and patient was encouraged to quit.      Generalized anxiety disorder     Chronic; currently controlled.  Continue home regimen.      Essential hypertension     Chronic; stable.  Continue chronic meds.      Hypothyroidism due to Hashimoto's thyroiditis     Chronic; pt states controlled.  Continue levothyroxine.      Pneumonia of left lower lobe due to infectious organism     Presents with SOB; LLL pneumonia identified on CXR.    Initiate treatment regimen for CAP, which includes a beta lactam and macrolide.   IV Rocephin and IV azithromycin.  Nebulizer treatments     Discussed with patient's .   VTE Risk Mitigation (From admission, onward)        Ordered     enoxaparin injection 40 mg  Daily      10/29/18 1231     IP VTE HIGH RISK PATIENT  Once      10/27/18 0207     Place EVELYNE hose  Until discontinued      10/27/18 0207     Place sequential compression device  Until discontinued      10/27/18 0207        Vicki Suarez MD  Department of Hospital Medicine   [facility]

## 2018-11-01 LAB
ALBUMIN SERPL BCP-MCNC: 2.3 G/DL
ALP SERPL-CCNC: 112 U/L
ALT SERPL W/O P-5'-P-CCNC: 109 U/L
ANION GAP SERPL CALC-SCNC: 8 MMOL/L
AORTIC ROOT ANNULUS: 2.11 CM
AORTIC VALVE CUSP SEPERATION: 1.59 CM
AST SERPL-CCNC: 145 U/L
AV MEAN GRADIENT: 2.4 MMHG
AV PEAK GRADIENT: 4.14 MMHG
AV VALVE AREA: 2.77 CM2
BACTERIA BLD CULT: NORMAL
BACTERIA BLD CULT: NORMAL
BACTERIA SPEC AEROBE CULT: NORMAL
BACTERIA SPEC AEROBE CULT: NORMAL
BASOPHILS # BLD AUTO: 0 K/UL
BASOPHILS NFR BLD: 0.3 %
BILIRUB SERPL-MCNC: 0.4 MG/DL
BSA FOR ECHO PROCEDURE: 1.56 M2
BUN SERPL-MCNC: 8 MG/DL
CALCIUM SERPL-MCNC: 9.6 MG/DL
CHLORIDE SERPL-SCNC: 91 MMOL/L
CO2 SERPL-SCNC: 38 MMOL/L
CREAT SERPL-MCNC: 0.7 MG/DL
CV ECHO LV RWT: 0.46 CM
DIFFERENTIAL METHOD: ABNORMAL
DOP CALC AO PEAK VEL: 1.02 M/S
DOP CALC AO VTI: 21.97 CM
DOP CALC LVOT AREA: 3.08 CM2
DOP CALC LVOT DIAMETER: 1.98 CM
DOP CALC LVOT STROKE VOLUME: 60.81 CM3
DOP CALCLVOT PEAK VEL VTI: 19.76 CM
E WAVE DECELERATION TIME: 195.85 MSEC
E/A RATIO: 1.13
E/E' RATIO: 13.06
ECHO LV POSTERIOR WALL: 0.76 CM (ref 0.6–1.1)
EOSINOPHIL # BLD AUTO: 0.2 K/UL
EOSINOPHIL NFR BLD: 2.7 %
ERYTHROCYTE [DISTWIDTH] IN BLOOD BY AUTOMATED COUNT: 13.7 %
EST. GFR  (AFRICAN AMERICAN): >60 ML/MIN/1.73 M^2
EST. GFR  (NON AFRICAN AMERICAN): >60 ML/MIN/1.73 M^2
FRACTIONAL SHORTENING: 34 % (ref 28–44)
GLUCOSE SERPL-MCNC: 107 MG/DL
GRAM STN SPEC: NORMAL
HAV IGM SERPL QL IA: NEGATIVE
HBV CORE IGM SERPL QL IA: NEGATIVE
HBV SURFACE AG SERPL QL IA: NEGATIVE
HCT VFR BLD AUTO: 42.5 %
HCV AB SERPL QL IA: NEGATIVE
HGB BLD-MCNC: 13.9 G/DL
INTERVENTRICULAR SEPTUM: 0.77 CM (ref 0.6–1.1)
IVRT: 0.1 MSEC
LEFT ATRIUM SIZE: 3.32 CM
LEFT INTERNAL DIMENSION IN SYSTOLE: 2.19 CM (ref 2.1–4)
LEFT VENTRICLE MASS INDEX: 41.7 G/M2
LEFT VENTRICULAR INTERNAL DIMENSION IN DIASTOLE: 3.32 CM (ref 3.5–6)
LEFT VENTRICULAR MASS: 65.07 G
LV LATERAL E/E' RATIO: 12.33
LV SEPTAL E/E' RATIO: 13.88
LYMPHOCYTES # BLD AUTO: 1.2 K/UL
LYMPHOCYTES NFR BLD: 13.7 %
MAGNESIUM SERPL-MCNC: 1.7 MG/DL
MCH RBC QN AUTO: 29.7 PG
MCHC RBC AUTO-ENTMCNC: 32.7 G/DL
MCV RBC AUTO: 91 FL
MONOCYTES # BLD AUTO: 0.8 K/UL
MONOCYTES NFR BLD: 8.6 %
MV PEAK A VEL: 0.98 M/S
MV PEAK E VEL: 1.11 M/S
MV STENOSIS PRESSURE HALF TIME: 56.8 MS
MV VALVE AREA P 1/2 METHOD: 3.87 CM2
NEUTROPHILS # BLD AUTO: 6.7 K/UL
NEUTROPHILS NFR BLD: 74.7 %
PHOSPHATE SERPL-MCNC: 3.7 MG/DL
PLATELET # BLD AUTO: 225 K/UL
PMV BLD AUTO: 9.2 FL
POTASSIUM SERPL-SCNC: 4.2 MMOL/L
PROT SERPL-MCNC: 6 G/DL
PULM VEIN A" WAVE DURATION": 83 MSEC
PV PEAK GRADIENT: 2.39 MMHG
PV PEAK VELOCITY: 0.77 CM/S
RBC # BLD AUTO: 4.67 M/UL
RIGHT VENTRICULAR END-DIASTOLIC DIMENSION: 2.48 CM
SODIUM SERPL-SCNC: 137 MMOL/L
TDI LATERAL: 0.09
TDI SEPTAL: 0.08
TDI: 0.09
TRICUSPID ANNULAR PLANE SYSTOLIC EXCURSION: 2.59 CM
WBC # BLD AUTO: 8.9 K/UL

## 2018-11-01 PROCEDURE — 94640 AIRWAY INHALATION TREATMENT: CPT

## 2018-11-01 PROCEDURE — 25000003 PHARM REV CODE 250: Performed by: INTERNAL MEDICINE

## 2018-11-01 PROCEDURE — 63600175 PHARM REV CODE 636 W HCPCS: Performed by: INTERNAL MEDICINE

## 2018-11-01 PROCEDURE — 97535 SELF CARE MNGMENT TRAINING: CPT

## 2018-11-01 PROCEDURE — 97165 OT EVAL LOW COMPLEX 30 MIN: CPT

## 2018-11-01 PROCEDURE — G8988 SELF CARE GOAL STATUS: HCPCS | Mod: CJ

## 2018-11-01 PROCEDURE — 25000242 PHARM REV CODE 250 ALT 637 W/ HCPCS: Performed by: INTERNAL MEDICINE

## 2018-11-01 PROCEDURE — 94761 N-INVAS EAR/PLS OXIMETRY MLT: CPT

## 2018-11-01 PROCEDURE — 99232 SBSQ HOSP IP/OBS MODERATE 35: CPT | Mod: ,,, | Performed by: INTERNAL MEDICINE

## 2018-11-01 PROCEDURE — 36415 COLL VENOUS BLD VENIPUNCTURE: CPT

## 2018-11-01 PROCEDURE — 25000003 PHARM REV CODE 250: Performed by: NURSE PRACTITIONER

## 2018-11-01 PROCEDURE — G8987 SELF CARE CURRENT STATUS: HCPCS | Mod: CK

## 2018-11-01 PROCEDURE — 94664 DEMO&/EVAL PT USE INHALER: CPT

## 2018-11-01 PROCEDURE — 12000002 HC ACUTE/MED SURGE SEMI-PRIVATE ROOM

## 2018-11-01 PROCEDURE — 27000646 HC AEROBIKA DEVICE

## 2018-11-01 PROCEDURE — 27000221 HC OXYGEN, UP TO 24 HOURS

## 2018-11-01 PROCEDURE — 99900035 HC TECH TIME PER 15 MIN (STAT)

## 2018-11-01 PROCEDURE — 80053 COMPREHEN METABOLIC PANEL: CPT

## 2018-11-01 PROCEDURE — 85025 COMPLETE CBC W/AUTO DIFF WBC: CPT

## 2018-11-01 PROCEDURE — 84100 ASSAY OF PHOSPHORUS: CPT

## 2018-11-01 PROCEDURE — 83735 ASSAY OF MAGNESIUM: CPT

## 2018-11-01 PROCEDURE — 80074 ACUTE HEPATITIS PANEL: CPT

## 2018-11-01 RX ORDER — ESCITALOPRAM OXALATE 20 MG/1
20 TABLET ORAL DAILY
COMMUNITY

## 2018-11-01 RX ORDER — FUROSEMIDE 10 MG/ML
20 INJECTION INTRAMUSCULAR; INTRAVENOUS ONCE
Status: COMPLETED | OUTPATIENT
Start: 2018-11-01 | End: 2018-11-01

## 2018-11-01 RX ORDER — AMITRIPTYLINE HYDROCHLORIDE 25 MG/1
25 TABLET, FILM COATED ORAL NIGHTLY
COMMUNITY

## 2018-11-01 RX ORDER — LORAZEPAM 1 MG/1
1 TABLET ORAL EVERY 12 HOURS PRN
COMMUNITY

## 2018-11-01 RX ORDER — LEVOTHYROXINE SODIUM 50 UG/1
50 TABLET ORAL
COMMUNITY

## 2018-11-01 RX ORDER — CEFTRIAXONE 1 G/50ML
1 INJECTION, SOLUTION INTRAVENOUS
Status: DISCONTINUED | OUTPATIENT
Start: 2018-11-01 | End: 2018-11-04

## 2018-11-01 RX ORDER — PROPRANOLOL HYDROCHLORIDE 80 MG/1
80 TABLET ORAL 2 TIMES DAILY
Status: ON HOLD | COMMUNITY
End: 2018-11-13 | Stop reason: HOSPADM

## 2018-11-01 RX ADMIN — METOPROLOL TARTRATE 50 MG: 50 TABLET ORAL at 09:11

## 2018-11-01 RX ADMIN — ESCITALOPRAM 20 MG: 10 TABLET, FILM COATED ORAL at 09:11

## 2018-11-01 RX ADMIN — CEFTRIAXONE 1 G: 1 INJECTION, SOLUTION INTRAVENOUS at 03:11

## 2018-11-01 RX ADMIN — IPRATROPIUM BROMIDE AND ALBUTEROL SULFATE 3 ML: .5; 3 SOLUTION RESPIRATORY (INHALATION) at 07:11

## 2018-11-01 RX ADMIN — METOPROLOL TARTRATE 50 MG: 50 TABLET ORAL at 10:11

## 2018-11-01 RX ADMIN — AMITRIPTYLINE HYDROCHLORIDE 25 MG: 25 TABLET, FILM COATED ORAL at 10:11

## 2018-11-01 RX ADMIN — IPRATROPIUM BROMIDE AND ALBUTEROL SULFATE 3 ML: .5; 3 SOLUTION RESPIRATORY (INHALATION) at 11:11

## 2018-11-01 RX ADMIN — PANTOPRAZOLE SODIUM 40 MG: 40 TABLET, DELAYED RELEASE ORAL at 09:11

## 2018-11-01 RX ADMIN — LEVOTHYROXINE SODIUM 50 MCG: 50 TABLET ORAL at 06:11

## 2018-11-01 RX ADMIN — ENOXAPARIN SODIUM 40 MG: 100 INJECTION SUBCUTANEOUS at 10:11

## 2018-11-01 RX ADMIN — FUROSEMIDE 20 MG: 10 INJECTION, SOLUTION INTRAMUSCULAR; INTRAVENOUS at 06:11

## 2018-11-01 RX ADMIN — IPRATROPIUM BROMIDE AND ALBUTEROL SULFATE 3 ML: .5; 3 SOLUTION RESPIRATORY (INHALATION) at 03:11

## 2018-11-01 NOTE — PLAN OF CARE
Problem: Patient Care Overview  Goal: Plan of Care Review  Disoriented to place and situation. Good comfort level established. POC reviewed with pt and spouse verbalized understanding. Remains on 3.5-4L o2, o2 sat between 91%-93%, poor appetite, res treatments, pt up with PT/OT. Up to shower today. Denies SOB Bed locked and in low posiyion, call light within reach, will continue to monitor.

## 2018-11-01 NOTE — PLAN OF CARE
Problem: Patient Care Overview  Goal: Plan of Care Review  Outcome: Ongoing (interventions implemented as appropriate)  Kristi aerosl tx well O2 on @ 4L decreased to 3L sats 98%  Unable to do Aerobika properly

## 2018-11-01 NOTE — PT/OT/SLP EVAL
"Occupational Therapy   Evaluation    Name: Bertha Kelly  MRN: [id number]  Admitting Diagnosis:  Acute respiratory failure with hypoxia      Recommendations:     Discharge Recommendations: home health PT, home health OT  Discharge Equipment Recommendations:  rollator  Barriers to discharge:  Decreased caregiver support    History:     Occupational Profile:  Living Environment: Pt lives with  and 31 y/o dtr in [facility] with 2 MARIA FERNANDA. Pt's  work 12 hr days. Pt's daughter works during the day and is home in the late afternoon.  Previous level of function: Pt was independent with ADLs and mobility.  Equipment Used at Home:  shower chair  Assistance upon Discharge: Pt will receive assistance from family, but does not have assistance in morning and afternoon.    Past Medical History:   Diagnosis Date    Hypertension     Thyroid disease          Past Surgical History:   Procedure Laterality Date    EYE SURGERY      childhood       Subjective     Chief Complaint: fatigue  Patient/Family Comments/goals: "I feel a little tired, but I'm okay."    Pain/Comfort:  Pain Rating 1: 0/10  Pain Rating Post-Intervention 1: 0/10    Patients cultural, spiritual, Caodaism conflicts given the current situation:      Objective:     Communicated with: nurse Linder prior to session.  Patient found all lines intact, call button in reach and  present and oxygen, telemetry, montgomery catheter upon OT entry to room.    General Precautions: Standard, fall, respiratory   Orthopedic Precautions:N/A   Braces: N/A     Occupational Performance:    Bed Mobility:    · Patient completed Scooting/Bridging with stand by assistance  · Patient completed Supine to Sit with stand by assistance  · Patient completed Sit to Supine with stand by assistance    Functional Mobility/Transfers:  · Patient completed Sit <> Stand Transfer with contact guard assistance and minimum assistance  with  hand-held assist   · Patient completed Toilet Transfer Stand " Pivot technique with contact guard assistance and minimum assistance with  hand-held assist  · Functional Mobility: Pt stood and took ~6 steps to BSC near bedside with CGA > Min A with HHA. Noted posterior and L lateral LOB requiring min A to regain balance.     Activities of Daily Living:  · Lower Body Dressing: stand by assistance to don/doff socks at EOB.    Cognitive/Visual Perceptual:  Cognitive/Psychosocial Skills:     -       Oriented to: Person, Place and Time   -       Follows Commands/attention:Follows multistep  commands  -       Communication: clear/fluent  -       Safety awareness/insight to disability: impaired   -       Mood/Affect/Coping skills/emotional control: Appropriate to situation and Cooperative  Visual/Perceptual:      -Intact     Physical Exam:  Balance:    -       Static/Dynamic Standing Bal - Fair minus  Postural examination/scapula alignment:    -       Rounded shoulders  -       Forward head  Upper Extremity Range of Motion:     -       Right Upper Extremity: WFL   -       Left Upper Extremity: WFL   Upper Extremity Strength:    -       Right Upper Extremity: WFL  -       Left Upper Extremity: WFL    Strength:    -       Right Upper Extremity: WFL  -       Left Upper Extremity: WFL   Fine Motor Coordination:    -       Intact  Gross motor coordination:   WFL    AMPAC 6 Click ADL:  AMPAC Total Score: 19    Treatment & Education:  OT educated patient on energy conservation techniques to reduce demand on cardiovascular system with performance of ADL tasks. OT issued an instructional handout to patient & focused on activity pacing, environmental modifications, work simplification & use of adaptive equipment to reduce fatigue & SOB.      OT educated patient on safety with functional mobility with cues for hand placement & sequencing.     Education:    Patient left HOB elevated with call button in reach, nurse notified and  present    Assessment:     Bertha Kelly is a 68 y.o.  "female with a medical diagnosis of Acute respiratory failure with hypoxia.  She presents with the following performance deficits affecting function: weakness, impaired endurance, impaired cardiopulmonary response to activity, impaired self care skills, gait instability, impaired balance, impaired functional mobilty.      Rehab Prognosis: Good; patient would benefit from acute skilled OT services to address these deficits and reach maximum level of function.         Clinical Decision Makin.  OT Low:  "Pt evaluation falls under low complexity for evaluation coding due to performance deficits noted in 1-3 areas as stated above and 0 co-morbities affecting current functional status. Data obtained from problem focused assessments. No modifications or assistance was required for completion of evaluation. Only brief occupational profile and history review completed."     Plan:     Patient to be seen 3 x/week to address the above listed problems via self-care/home management, therapeutic activities, therapeutic exercises  · Plan of Care Expires: 11/15/18  · Plan of Care Reviewed with: patient, spouse    This Plan of care has been discussed with the patient who was involved in its development and understands and is in agreement with the identified goals and treatment plan    GOALS:   Multidisciplinary Problems     Occupational Therapy Goals        Problem: Occupational Therapy Goal    Goal Priority Disciplines Outcome Interventions   Occupational Therapy Goal     OT, PT/OT Ongoing (interventions implemented as appropriate)    Description:  Goals to be met by: 11/15/2018    Patient will increase functional independence with ADLs by performing:    LE Dressing with Modified [place].  Grooming while standing at sink with Supervision.  Toileting from toilet with Modified Prentiss for hygiene and clothing management.   Supine to sit with Modified Prentiss.  Toilet transfer to toilet with Supervision.  BUE " Exercises x10 with Blaine.                      Time Tracking:     OT Date of Treatment: 11/01/18  OT Start Time: 1131  OT Stop Time: 1158  OT Total Time (min): 27 min    Billable Minutes:Evaluation 10  Self Care/Home Management 17    Behzad Davis, OT  11/1/2018

## 2018-11-01 NOTE — PLAN OF CARE
Problem: Patient Care Overview  Goal: Plan of Care Review  Patient sats 95% on 4lpm/Aerobika done 10bpm/not in any distress

## 2018-11-01 NOTE — PT/OT/SLP PROGRESS
"Physical Therapy      Patient Name:  Bertha Kelly   MRN:  [id number]    Patient not seen today secondary to Patient unwilling to participate, Patient fatigue, Toileting. Upon 1st attempt, pt just got finished with toileting, showering and just got back in bed. Pt unwilling to participate 2' fatigue. Pt also stated "I'll walk tomorrow." upon 2nd attempt in PM, pt remained unwilling 2' fatigue. Will follow-up .    Kyra Houston, PTA    "

## 2018-11-01 NOTE — PROGRESS NOTES
Progress Note  Hospital Medicine  Patient Name:Bertha Kelly  MRN:  [id number]  Patient Class: IP- Inpatient  Admit Date: 10/26/2018  Length of Stay: 5 days  Expected Discharge Date:   Attending Physician: Vicki Suarez MD  Primary Care Provider:  James Colon MD    SUBJECTIVE:     Principal Problem: Acute respiratory failure with hypoxia  Initial history of present illness: Bertha Kelly is a 67 y/o female with a PMHx of HTN and thyroid disease who presented to the ED today with c/o SOB which began 3 weeks ago and has progressively gotten worse.  Pt states that she becomes SOB with activity.  She reports that she received a pneumonia vaccine at her doctor's office about a month ago and began having some chest congestion and cough a few days later.  She is a current smoker of about 1/2 pack daily.  Reports increased fatigue and chills, but denies fever, chest pain, N/V, and dysuria.  Reports weight loss of a few pounds in the past 3 weeks due to poor appetite, but states that prior to this illness, weight was stable and she had a very good appetite.  Pt's CXR reveals a LLL pneumonia and was noted to be mildly hypoxic initially requiring supplemental oxygen.  She will be admitted to the service of hospital medicine for further treatment.    PMH/PSH/SH/FH/Meds: reviewed.    Symptoms/Review of Systems: Afebrile. Feeling better after 1500 cc thoracentesis. Reports improving cough and SOB. No chest pain or headache, fever or abdominal pain.     Diet:  Adequate intake.    Activity level: Normal.    Pain:  Patient reports no pain.       OBJECTIVE:   Vital Signs (Most Recent):      Temp: 98.7 °F (37.1 °C) (10/31/18 2336)  Pulse: 64 (11/01/18 0725)  Resp: 14 (11/01/18 0725)  BP: (!) 113/53 (10/31/18 2336)  SpO2: 97 % (11/01/18 0725)       Vital Signs Range (Last 24H):  Temp:  [97 °F (36.1 °C)-98.7 °F (37.1 °C)]   Pulse:  [61-87]   Resp:  [14-18]   BP: (101-134)/(52-65)   SpO2:  [94 %-97 %]     Weight: 59 kg (130 lb 1  oz)  Body mass index is 23.04 kg/m².    Intake/Output Summary (Last 24 hours) at 11/1/2018 0857  Last data filed at 10/31/2018 1800  Gross per 24 hour   Intake 480 ml   Output 1650 ml   Net -1170 ml     Physical Examination:  Constitutional: She is oriented to person, place, and time. She appears well-developed and well-nourished. No distress.   HENT:   Head: Normocephalic and atraumatic.   Eyes: Pupils are equal, round, and reactive to light.   Neck: Neck supple. No thyromegaly present.   Cardiovascular: Normal rate and regular rhythm. Exam reveals no gallop and no friction rub.   No murmur heard.  Pulmonary/Chest: No respiratory distress. She has wheezes.   Abdominal: Soft. Bowel sounds are normal. She exhibits no distension. There is no tenderness. There is no guarding.   Musculoskeletal: Normal range of motion. She exhibits no edema.   Neurological: She is alert and oriented to person, place, and time.   Skin: Skin is warm and dry. No erythema.   Psychiatric: She has a normal mood and affect.     CBC:  Recent Labs   Lab 10/30/18  0723 10/31/18  0609 11/01/18  0456   WBC 11.10 9.60 8.90   RBC 4.57 4.67 4.67   HGB 13.5 13.8 13.9   HCT 42.3 42.4 42.5    210 225   MCV 93 91 91   MCH 29.5 29.5 29.7   MCHC 31.9* 32.5 32.7   BMP  Recent Labs   Lab 10/30/18  0723 10/31/18  0609 11/01/18  0456   GLU 88 99 107    137 137   K 3.8 4.3 4.2   CL 95 95 91*   CO2 36* 33* 38*   BUN 8 6* 8   CREATININE 0.7 0.8 0.7   CALCIUM 9.1 9.3 9.6   MG 1.7 1.8 1.7      Diagnostic Results:  Microbiology Results (last 7 days)     Procedure Component Value Units Date/Time    Culture, Respiratory with Gram Stain [[id number]] Collected:  10/29/18 1401    Order Status:  Completed Specimen:  Respiratory from Sputum, Expectorated Updated:  10/31/18 1506     Respiratory Culture --     CANDIDA ALBICANS  Moderate       Respiratory Culture --     STENOTROPHOMONAS (X.) MALTOPHILIA  Rare  Susceptibility pending       Gram Stain (Respiratory)  <10 epithelial cells per low power field.     Gram Stain (Respiratory) Few WBC's     Gram Stain (Respiratory) Moderate yeast     Gram Stain (Respiratory) Rare Gram positive rods    Blood culture x two cultures. Draw prior to antibiotics. [[id number]] Collected:  10/26/18 2348    Order Status:  Completed Specimen:  Blood from Peripheral, Left  Arm Updated:  10/31/18 1212     Blood Culture, Routine No Growth to date     Blood Culture, Routine No Growth to date     Blood Culture, Routine No Growth to date     Blood Culture, Routine No Growth to date     Blood Culture, Routine No Growth to date    Narrative:       Aerobic and anaerobic    Blood culture x two cultures. Draw prior to antibiotics. [[id number]] Collected:  10/26/18 2348    Order Status:  Completed Specimen:  Blood from Peripheral, Right  Wrist Updated:  10/31/18 1212     Blood Culture, Routine No Growth to date     Blood Culture, Routine No Growth to date     Blood Culture, Routine No Growth to date     Blood Culture, Routine No Growth to date     Blood Culture, Routine No Growth to date    Narrative:       Aerobic and anaerobic    Culture, Body Fluid (Aerobic) w/ GS [[id number]] Collected:  10/29/18 1815    Order Status:  Completed Specimen:  Body Fluid from Pleural Fluid Updated:  10/31/18 0730     AEROBIC CULTURE - FLUID No growth     Gram Stain Result Cytospin indicates:      Many WBC's      No organisms seen    Urine culture [[id number]] Collected:  10/27/18 2330    Order Status:  Completed Specimen:  Urine Updated:  10/29/18 1221     Urine Culture, Routine --     COAGULASE-NEGATIVE STAPHYLOCOCCUS SPECIES  10,000 - 49,999 cfu/ml  Susceptibility testing not routinely performed.  No other significant isolate      Narrative:       Preferred Collection Type->Urine, Clean Catch         CXR: The cardiomediastinal silhouette is normal in appearance.  No pulmonary vascular congestion appreciated. There is dense airspace consolidative change present at the left  lung base and there is patchy segmental airspace opacity in the left mid and upper lung zones.  Differential considerations include pneumonia and aspiration.  Underlying pleural fluid cannot be excluded.  An underlying mass would be difficult to exclude.  Serial radiography will be necessary to ensure resolution and exclude an underlying lesion.  There is an age-indeterminate, possibly remote, right posterior 10th rib fracture.    CXR:  Unchanged small left apical pneumothorax. Unchanged moderate left and small right pleural effusions and multifocal airspace disease.    CT chest:  Small left pneumothorax status post preceding left thoracentesis.  Less than 10%.  Moderate left and small right pleural effusions and moderate left lung and mild right lower lobe consolidation/atelectasis.  Enlarged prevascular mediastinal lymph node.  Additional nodular opacities at the cardiac apex which may represent additional prominent lymph nodes, or possibly pleural based nodules.  Moderate emphysema.  A distinct lung mass is not visible however follow-up to resolution of the pulmonary consolidation is recommended to evaluate for on the underlying lung mass, considering the enlarged mediastinal lymph node.  Further evaluation with CT chest with contrast may also be useful if the patient can receive IV contrast. 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    CXR: Unchanged small left apical pneumothorax. Unchanged moderate left and small right pleural effusions and multifocal airspace disease.    CXR: Continued dense infiltrate in the left mid and lower lung field with atelectasis and moderate to large left pleural effusion which obscures the heart size.  Small infiltrate at the right lung base and small right pleural effusion as well    Assessment/Plan:     * Acute respiratory  failure with hypoxia  Large left para-pneumonic effusion s/p thoracentesis     Presented with increased SOB x 3 weeks.  Oxygen saturation noted at 89%, recovered well with supplemental oxygen via NC.  Likely due to pneumonia of LLL.  Pt is a daily smoker.  Likely has COPD, although she is not currently treated with chronic meds.  Continue supplemental oxygen as needed.  Nebulizer treatments   Monitor and treat as clinically indicated.  Follow Dr. Abdi's recommendations. Follow 2 D ECHO; await cardiologist evaluation.      Tobacco abuse     Smoking cessation counseling performed. Dangers of cigarette smoking were reviewed with patient in detail and patient was encouraged to quit.      Generalized anxiety disorder     Chronic; currently controlled.  Continue home regimen.      Essential hypertension     Chronic; stable.  Continue chronic meds.      Hypothyroidism due to Hashimoto's thyroiditis     Chronic; pt states controlled.  Continue levothyroxine.      Pneumonia of left lower lobe due to infectious organism     Presents with SOB; LLL pneumonia identified on CXR.    Initiate treatment regimen for CAP, which includes a beta lactam and macrolide.   IV Rocephin and IV azithromycin.  Nebulizer treatments     Discussed with patient's .   VTE Risk Mitigation (From admission, onward)        Ordered     enoxaparin injection 40 mg  Daily      10/29/18 1231     IP VTE HIGH RISK PATIENT  Once      10/27/18 0207     Place EVELYNE hose  Until discontinued      10/27/18 0207     Place sequential compression device  Until discontinued      10/27/18 0207        Vicki Suarez MD  Department of Hospital Medicine   [facility]

## 2018-11-01 NOTE — PLAN OF CARE
Problem: Patient Care Overview  Goal: Plan of Care Review  Outcome: Ongoing (interventions implemented as appropriate)  Patient alert and oriented to self and place, VSS. Patient showing intermittent confusion.  O2 @  4L.  De Anda cath draining clear yellow urine. Avasys in use.  Family at bedside. Pt verbalized understanding of POC. Q2hr rounding done during shift to promote patient safety. Patient free from falls and injury during shift.  Bed in lowest position, brakes locked, and call light within reach.  Will continue to monitor.

## 2018-11-01 NOTE — CONSULTS
HISTORY OF PRESENT ILLNESS:  This 68-year-old patient was admitted with symptoms   of increasing shortness of breath for the past several days and weeks.  The   patient's condition has deteriorated gradually and now she gets dyspnea with   mild activity.  She denies any orthopnea or paroxysmal nocturnal dyspnea.  The   patient has no chest pain, fever, chills, cough or hemoptysis reported.  No   prior history of heart disease.  She was apparently evaluated by Dr. Gunn 2   or 3 years ago and the workup was negative.  She is under care of Dr. Colon for   chronic hypertension and COPD.    PAST MEDICAL HISTORY:  Past history of thyroid disease.  The patient was on a   [place] cruise in June 2017, and she has been caught sick, but apparently   recovered well.    FAMILY HISTORY:  Positive for hypertension.    SOCIAL HISTORY:  The patient is a chronic smoker.  She does not drink.    ALLERGIES:  CODEINE AND PENICILLIN.    HOME MEDICATIONS:  None listed.    REVIEW OF SYSTEMS:  CENTRAL NERVOUS SYSTEM:  Negative for TIA or headaches.  GASTROINTESTINAL AND GENITOURINARY:  Unremarkable.    PHYSICAL EXAMINATION:  GENERAL:  Shows a thin built female patient, in no acute distress.  VITAL SIGNS:  Temperature 98, pulse 70 per minute and regular, O2 sat 94% on   room air.  HEENT:  Normocephalic, sclerae nonicteric.  NECK:  Supple.  There is no jugular venous distention.  Carotids 1+ without   bruit.  CARDIAC:  Regular rhythm.  Heart sounds distant, no murmur or gallop heard.  LUNGS:  Reveal diminished breath sounds bilaterally.  ABDOMEN:  Soft, nontender, bowel sounds present.  EXTREMITIES:  No edema of feet noted.    EKG shows sinus rhythm with generalized nonspecific ST-T changes.  Poor R-wave   progression in precordial leads.    Chest x-ray shows cardiomegaly with bilateral pleural effusion, mostly on the   left side.    LABORATORY DATA:  WBC 9.6, hemoglobin 13.8, hematocrit 210, D-dimer 0.39, sodium   137, potassium 4.3,  chloride 95, , magnesium 1.8, alkaline phosphatase   81, total protein 6.1, albumin 2.5, AST 60, ALT 59, BUN 6, creatinine 0.8.    IMPRESSION:  1.  Symptoms of progressive dyspnea and effort intolerance, bilateral pleural   effusions, cardiac decompensation.  2.  Chronic obstructive pulmonary disease.  3.  Hypertension.    PLAN:  The patient apparently underwent  thoracentesis.  The fluid analysis is pending.    She is feeling  better  Echocardiogram is pending.  Further recommendations depend   on that.            /amy [id number] micaela(s)        /JOANNA  dd: 10/31/2018 20:05:57 (CDT)  td: 10/31/2018 23:32:37 (CDT)  Doc ID   #[id number]  Job ID #[id number]    CC:

## 2018-11-01 NOTE — PROGRESS NOTES
Progress Note  PULMONARY    Admit Date: 10/26/2018   11/01/2018      SUBJECTIVE:     Oct 30, breathing better, some appetite.  Oct 31, no c/o, tried pull out lines last pm, denies sob.  Nov 1, no c/o, says breathing and eating ok?      PFSH and Allergies reviewed.    OBJECTIVE:     Vitals (Most recent):  Vitals:    11/01/18 1556   BP:    Pulse: 87   Resp: 16   Temp:        Vitals (24 hour range):  Temp:  [97.4 °F (36.3 °C)-99.1 °F (37.3 °C)]   Pulse:  [63-87]   Resp:  [14-20]   BP: (111-130)/(53-61)   SpO2:  [91 %-97 %]       Intake/Output Summary (Last 24 hours) at 11/1/2018 1729  Last data filed at 10/31/2018 1800  Gross per 24 hour   Intake 240 ml   Output 1650 ml   Net -1410 ml          Physical Exam:  The patient's neuro status (alertness,orientation,cognitive function,motor skills,), pharyngeal exam (oral lesions, hygiene, abn dentition,), Neck (jvd,mass,thyroid,nodes in neck and above/below clavicle),RESPIRATORY(symmetry,effort,fremitus,percussion,auscultation),  Cor(rhythm,heart tones including gallops,perfusion,edema)ABD(distention,hepatic&splenomegaly,tenderness,masses), Skin(rash,cyanosis),Psyc(affect,judgement,).  Exam negative except for these pertinent findings:    Left dullness  2/3up  , good bs, no edema nor distress    Radiographs reviewed: view by direct vision  Ct bilat effusion, dense left lower lung.  cxr looks like pre tap left effusion    Results for orders placed during the hospital encounter of 10/26/18   X-Ray Chest 1 View    Narrative EXAMINATION:  XR CHEST 1 VIEW    CLINICAL HISTORY:  post thoracentesis;    TECHNIQUE:  Single frontal view of the chest was performed.    COMPARISON:  Chest radiograph 10/29/2018; CT dated 10/30/2018    FINDINGS:  Bibasilar airspace disease.  Normal size heart. Indistinct pulmonary vasculature. Blunted costophrenic angles.  Left apical pneumothorax.      Impression 1. Post thoracentesis with reduction in size of left pleural effusion and development of apical  pneumothorax.  This is more conspicuous on subsequent CT.  2. Small right pleural effusion.  3. Bibasilar airspace disease which could reflect atelectasis, pneumonia, edema.      Electronically signed by: Demar Ann  Date:    10/30/2018  Time:    10:33   ]    Labs     Recent Labs   Lab 11/01/18  0456   WBC 8.90   HGB 13.9   HCT 42.5      Electronically reviewed and signed by:   Ursula Chow MD   Signed on 10/30/18 at 13:20   Pathologist review of pleural fluid cell count:   The cytospin shows a predominance of mature appearing lymphocytes   with rare neutrophils and eosinophils seen in the background as well   as an occasional atypical mesothelial cell, favor reactive.     Correlate clinically.  Recent Labs   Lab 11/01/18 0456      K 4.2   CL 91*   CO2 38*   BUN 8   CREATININE 0.7      CALCIUM 9.6   MG 1.7   PHOS 3.7   *   *   ALKPHOS 112   BILITOT 0.4   PROT 6.0   ALBUMIN 2.3*   No results for input(s): PH, PCO2, PO2, HCO3 in the last 24 hours.  Microbiology Results (last 7 days)     Procedure Component Value Units Date/Time    Culture, Respiratory with Gram Stain [[id number]]  (Susceptibility) Collected:  10/29/18 1401    Order Status:  Completed Specimen:  Respiratory from Sputum, Expectorated Updated:  11/01/18 1313     Respiratory Culture --     CANDIDA ALBICANS  Moderate       Respiratory Culture --     STENOTROPHOMONAS (X.) MALTOPHILIA  Rare       Gram Stain (Respiratory) <10 epithelial cells per low power field.     Gram Stain (Respiratory) Few WBC's     Gram Stain (Respiratory) Moderate yeast     Gram Stain (Respiratory) Rare Gram positive rods    Blood culture x two cultures. Draw prior to antibiotics. [[id number]] Collected:  10/26/18 2678    Order Status:  Completed Specimen:  Blood from Peripheral, Left  Arm Updated:  11/01/18 1212     Blood Culture, Routine No growth after 5 days.    Narrative:       Aerobic and anaerobic    Blood culture x two cultures.  Draw prior to antibiotics. [[id number]] Collected:  10/26/18 2348    Order Status:  Completed Specimen:  Blood from Peripheral, Right  Wrist Updated:  11/01/18 1212     Blood Culture, Routine No growth after 5 days.    Narrative:       Aerobic and anaerobic    Culture, Body Fluid (Aerobic) w/ GS [[id number]] Collected:  10/29/18 1815    Order Status:  Completed Specimen:  Body Fluid from Pleural Fluid Updated:  10/31/18 0730     AEROBIC CULTURE - FLUID No growth     Gram Stain Result Cytospin indicates:      Many WBC's      No organisms seen    Urine culture [[id number]] Collected:  10/27/18 2330    Order Status:  Completed Specimen:  Urine Updated:  10/29/18 1221     Urine Culture, Routine --     COAGULASE-NEGATIVE STAPHYLOCOCCUS SPECIES  10,000 - 49,999 cfu/ml  Susceptibility testing not routinely performed.  No other significant isolate      Narrative:       Preferred Collection Type->Urine, Clean Catch          Impression:  Active Hospital Problems    Diagnosis  POA    *Acute respiratory failure with hypoxia [J96.01]  Yes    Pleural effusion [J90]  Yes    Wheeze [R06.2]  Yes    Anorexia [R63.0]  Yes    SOB (shortness of breath) [R06.02]  Yes    Pneumonia of left lower lobe due to infectious organism [J18.1]  Yes    Hypothyroidism due to Hashimoto's thyroiditis [E03.8, E06.3]  Yes    Essential hypertension [I10]  Yes    Generalized anxiety disorder [F41.1]  Yes    Tobacco abuse [Z72.0]  Yes      Resolved Hospital Problems   No resolved problems to display.               Plan:     Oct 30, fluid was lymph predominate exudate.  Cancer concerning.  F/u cxr am - if fluid building up would recommend thoracoscopic procedure with Dr Jones.  Cytology pending.  procalcitonin was very low.  Check d dimer and bnp am.  Discussed with pt and family.  Oct 31, bnp up, cytospin cells not felt malignant - cytology pending.  D dimer good.  cxr left effusion increasing. Dose lasix, check echo.  May need Dr Jones for  procedure?  F/u cxr am to decide.    Nov 1, echo na, cxr worsening - fluid rapidly returning.  S. Maltophilia in sputum but procalcitonin was low.  Cytology na.  Discussed with Dr Suarez.      Pt maybe a little better but is non ambulatory and frail ( too frail to do therapy).  Will dose lasix 20, ask Dr Jones to see.  Pt wanting to go home????    lft sl up and co2 rising.                                .

## 2018-11-01 NOTE — PLAN OF CARE
Problem: Patient Care Overview  Goal: Plan of Care Review  Patient aerosol Q8 given via msk tolerated well sats 96% on 4lpm

## 2018-11-01 NOTE — PLAN OF CARE
Problem: Occupational Therapy Goal  Goal: Occupational Therapy Goal  Goals to be met by: 11/15/2018    Patient will increase functional independence with ADLs by performing:    LE Dressing with Modified [place].  Grooming while standing at sink with Supervision.  Toileting from toilet with Modified [place] for hygiene and clothing management.   Supine to sit with Modified [place].  Toilet transfer to toilet with Supervision.  BUE Exercises x10 with [place].    Outcome: Ongoing (interventions implemented as appropriate)  OT evaluation completed today. Goals & care plan established.    Behzad Davis, OT  11/1/2018

## 2018-11-02 ENCOUNTER — OUTSIDE PLACE OF SERVICE (OUTPATIENT)
Dept: PULMONOLOGY | Facility: CLINIC | Age: 68
End: 2018-11-02
Payer: COMMERCIAL

## 2018-11-02 LAB
ALBUMIN SERPL BCP-MCNC: 2.4 G/DL
ALP SERPL-CCNC: 130 U/L
ALT SERPL W/O P-5'-P-CCNC: 109 U/L
ANION GAP SERPL CALC-SCNC: 13 MMOL/L
AST SERPL-CCNC: 103 U/L
BASOPHILS # BLD AUTO: 0 K/UL
BASOPHILS NFR BLD: 0.3 %
BILIRUB SERPL-MCNC: 0.3 MG/DL
BUN SERPL-MCNC: 9 MG/DL
CALCIUM SERPL-MCNC: 9.7 MG/DL
CHLORIDE SERPL-SCNC: 88 MMOL/L
CO2 SERPL-SCNC: 35 MMOL/L
CREAT SERPL-MCNC: 0.7 MG/DL
DIFFERENTIAL METHOD: ABNORMAL
EOSINOPHIL # BLD AUTO: 0.2 K/UL
EOSINOPHIL NFR BLD: 1.8 %
ERYTHROCYTE [DISTWIDTH] IN BLOOD BY AUTOMATED COUNT: 13.4 %
EST. GFR  (AFRICAN AMERICAN): >60 ML/MIN/1.73 M^2
EST. GFR  (NON AFRICAN AMERICAN): >60 ML/MIN/1.73 M^2
GLUCOSE SERPL-MCNC: 98 MG/DL
HCT VFR BLD AUTO: 42.7 %
HGB BLD-MCNC: 14.1 G/DL
LYMPHOCYTES # BLD AUTO: 1.2 K/UL
LYMPHOCYTES NFR BLD: 12.5 %
MAGNESIUM SERPL-MCNC: 1.8 MG/DL
MCH RBC QN AUTO: 29.7 PG
MCHC RBC AUTO-ENTMCNC: 32.9 G/DL
MCV RBC AUTO: 90 FL
MONOCYTES # BLD AUTO: 0.9 K/UL
MONOCYTES NFR BLD: 9.4 %
NEUTROPHILS # BLD AUTO: 7.4 K/UL
NEUTROPHILS NFR BLD: 76 %
PHOSPHATE SERPL-MCNC: 3.7 MG/DL
PLATELET # BLD AUTO: 273 K/UL
PMV BLD AUTO: 9.4 FL
POTASSIUM SERPL-SCNC: 3.8 MMOL/L
PROT SERPL-MCNC: 6.4 G/DL
RBC # BLD AUTO: 4.73 M/UL
SODIUM SERPL-SCNC: 136 MMOL/L
WBC # BLD AUTO: 9.7 K/UL

## 2018-11-02 PROCEDURE — 99900035 HC TECH TIME PER 15 MIN (STAT)

## 2018-11-02 PROCEDURE — 85025 COMPLETE CBC W/AUTO DIFF WBC: CPT

## 2018-11-02 PROCEDURE — 99232 SBSQ HOSP IP/OBS MODERATE 35: CPT | Mod: ,,, | Performed by: INTERNAL MEDICINE

## 2018-11-02 PROCEDURE — 25000003 PHARM REV CODE 250: Performed by: NURSE PRACTITIONER

## 2018-11-02 PROCEDURE — 12000002 HC ACUTE/MED SURGE SEMI-PRIVATE ROOM

## 2018-11-02 PROCEDURE — 94640 AIRWAY INHALATION TREATMENT: CPT

## 2018-11-02 PROCEDURE — 25000003 PHARM REV CODE 250: Performed by: INTERNAL MEDICINE

## 2018-11-02 PROCEDURE — 36415 COLL VENOUS BLD VENIPUNCTURE: CPT

## 2018-11-02 PROCEDURE — 25000242 PHARM REV CODE 250 ALT 637 W/ HCPCS: Performed by: INTERNAL MEDICINE

## 2018-11-02 PROCEDURE — 94664 DEMO&/EVAL PT USE INHALER: CPT

## 2018-11-02 PROCEDURE — 83735 ASSAY OF MAGNESIUM: CPT

## 2018-11-02 PROCEDURE — 27000221 HC OXYGEN, UP TO 24 HOURS

## 2018-11-02 PROCEDURE — 84100 ASSAY OF PHOSPHORUS: CPT

## 2018-11-02 PROCEDURE — 94761 N-INVAS EAR/PLS OXIMETRY MLT: CPT

## 2018-11-02 PROCEDURE — 80053 COMPREHEN METABOLIC PANEL: CPT

## 2018-11-02 PROCEDURE — 97530 THERAPEUTIC ACTIVITIES: CPT

## 2018-11-02 PROCEDURE — 63600175 PHARM REV CODE 636 W HCPCS: Performed by: INTERNAL MEDICINE

## 2018-11-02 RX ORDER — FUROSEMIDE 20 MG/1
20 TABLET ORAL DAILY
Status: DISCONTINUED | OUTPATIENT
Start: 2018-11-02 | End: 2018-11-14 | Stop reason: HOSPADM

## 2018-11-02 RX ADMIN — LEVOTHYROXINE SODIUM 50 MCG: 50 TABLET ORAL at 06:11

## 2018-11-02 RX ADMIN — ACETAMINOPHEN 650 MG: 325 TABLET, FILM COATED ORAL at 04:11

## 2018-11-02 RX ADMIN — ENOXAPARIN SODIUM 40 MG: 100 INJECTION SUBCUTANEOUS at 09:11

## 2018-11-02 RX ADMIN — IPRATROPIUM BROMIDE AND ALBUTEROL SULFATE 3 ML: .5; 3 SOLUTION RESPIRATORY (INHALATION) at 03:11

## 2018-11-02 RX ADMIN — CEFTRIAXONE 1 G: 1 INJECTION, SOLUTION INTRAVENOUS at 03:11

## 2018-11-02 RX ADMIN — METOPROLOL TARTRATE 50 MG: 50 TABLET ORAL at 09:11

## 2018-11-02 RX ADMIN — FUROSEMIDE 20 MG: 20 TABLET ORAL at 10:11

## 2018-11-02 RX ADMIN — PANTOPRAZOLE SODIUM 40 MG: 40 TABLET, DELAYED RELEASE ORAL at 09:11

## 2018-11-02 RX ADMIN — LORAZEPAM 1.5 MG: 1 TABLET ORAL at 09:11

## 2018-11-02 RX ADMIN — IPRATROPIUM BROMIDE AND ALBUTEROL SULFATE 3 ML: .5; 3 SOLUTION RESPIRATORY (INHALATION) at 07:11

## 2018-11-02 RX ADMIN — AMITRIPTYLINE HYDROCHLORIDE 25 MG: 25 TABLET, FILM COATED ORAL at 09:11

## 2018-11-02 RX ADMIN — ESCITALOPRAM 20 MG: 10 TABLET, FILM COATED ORAL at 09:11

## 2018-11-02 NOTE — PHYSICIAN QUERY
PT Name: Bertha Kelly  MR #: [id number]    Physician Query Form - Pathology Findings Clarification     CDS/: Shiloh Burnham               Contact information:[email or web address]  This form is a permanent document in the medical record.     Query Date: November 2, 2018      By submitting this query, we are merely seeking further clarification of documentation.  Please utilize your independent clinical judgment when addressing the question(s) below.      The medical record contains the following:     Findings Supporting Clinical Information Location in Medical Record   primary lung adenocarcinoma The cytopathologic features and immunohistochemistry profile of the malignant cells seen in the left pleural fluid are consistent with a primary lung adenocarcinoma Path report 10/29     Please document the clinical significance of the Pathologists findings of _primary lung adenocarcinoma_.          [  x] I agree with the Pathology Findings        [  ] I do not agree with the Pathology Findings        [  ] Clinically Insignificant        [  ] Clinically Undetermined        [  ] Other/Clarification of Findings: ______________________________________________    Please document in your progress notes daily for the duration of treatment until resolved and include in your discharge summary.

## 2018-11-02 NOTE — PROGRESS NOTES
Progress Note  Hospital Medicine  Patient Name:Bertha Kelly  MRN:  [id number]  Patient Class: IP- Inpatient  Admit Date: 10/26/2018  Length of Stay: 6 days  Expected Discharge Date:   Attending Physician: Vicki Suarez MD  Primary Care Provider:  James Colon MD    SUBJECTIVE:     Principal Problem: Acute respiratory failure with hypoxia  Initial history of present illness: Bertha Kelly is a 69 y/o female with a PMHx of HTN and thyroid disease who presented to the ED today with c/o SOB which began 3 weeks ago and has progressively gotten worse.  Pt states that she becomes SOB with activity.  She reports that she received a pneumonia vaccine at her doctor's office about a month ago and began having some chest congestion and cough a few days later.  She is a current smoker of about 1/2 pack daily.  Reports increased fatigue and chills, but denies fever, chest pain, N/V, and dysuria.  Reports weight loss of a few pounds in the past 3 weeks due to poor appetite, but states that prior to this illness, weight was stable and she had a very good appetite.  Pt's CXR reveals a LLL pneumonia and was noted to be mildly hypoxic initially requiring supplemental oxygen.  She will be admitted to the service of hospital medicine for further treatment.    PMH/PSH/SH/FH/Meds: reviewed.    Symptoms/Review of Systems: Afebrile. Patient denied any complaints however, pleural effusion re-accumulating. Reports improving cough and SOB. No chest pain or headache, fever or abdominal pain.     Diet:  Adequate intake.    Activity level: Up with assistance  Pain:  Patient reports no pain.       OBJECTIVE:   Vital Signs (Most Recent):      Temp: 99.4 °F (37.4 °C) (11/02/18 0816)  Pulse: 81 (11/02/18 0816)  Resp: 17 (11/02/18 0816)  BP: (!) 122/57 (11/02/18 0816)  SpO2: (!) 94 % (11/02/18 0816)       Vital Signs Range (Last 24H):  Temp:  [98.4 °F (36.9 °C)-99.4 °F (37.4 °C)]   Pulse:  [71-87]   Resp:  [16-20]   BP: (111-131)/(51-63)   SpO2:   [92 %-95 %]     Weight: 57.3 kg (126 lb 5.2 oz)  Body mass index is 22.38 kg/m².    Intake/Output Summary (Last 24 hours) at 11/2/2018 0921  Last data filed at 11/2/2018 0600  Gross per 24 hour   Intake 820 ml   Output 1600 ml   Net -780 ml     Physical Examination:  Constitutional: She is oriented to person, place, and time. She appears well-developed and well-nourished. No distress.   HENT:   Head: Normocephalic and atraumatic.   Eyes: Pupils are equal, round, and reactive to light.   Neck: Neck supple. No thyromegaly present.   Cardiovascular: Normal rate and regular rhythm. Exam reveals no gallop and no friction rub.   No murmur heard.  Pulmonary/Chest: No respiratory distress. She has wheezes.   Abdominal: Soft. Bowel sounds are normal. She exhibits no distension. There is no tenderness. There is no guarding.   Musculoskeletal: Normal range of motion. She exhibits no edema.   Neurological: She is alert and oriented to person, place, and time.   Skin: Skin is warm and dry. No erythema.   Psychiatric: She has a normal mood and affect.     CBC:  Recent Labs   Lab 10/31/18  0609 11/01/18  0456 11/02/18  0535   WBC 9.60 8.90 9.70   RBC 4.67 4.67 4.73   HGB 13.8 13.9 14.1   HCT 42.4 42.5 42.7    225 273   MCV 91 91 90   MCH 29.5 29.7 29.7   MCHC 32.5 32.7 32.9   BMP  Recent Labs   Lab 10/31/18  0609 11/01/18  0456 11/02/18  0535   GLU 99 107 98    137 136   K 4.3 4.2 3.8   CL 95 91* 88*   CO2 33* 38* 35*   BUN 6* 8 9   CREATININE 0.8 0.7 0.7   CALCIUM 9.3 9.6 9.7   MG 1.8 1.7 1.8      Diagnostic Results:  Microbiology Results (last 7 days)     Procedure Component Value Units Date/Time    Culture, Respiratory with Gram Stain [[id number]]  (Susceptibility) Collected:  10/29/18 1401    Order Status:  Completed Specimen:  Respiratory from Sputum, Expectorated Updated:  11/01/18 1313     Respiratory Culture --     CANDIDA ALBICANS  Moderate       Respiratory Culture --     STENOTROPHOMONAS (X.)  MALTOPHILIA  Rare       Gram Stain (Respiratory) <10 epithelial cells per low power field.     Gram Stain (Respiratory) Few WBC's     Gram Stain (Respiratory) Moderate yeast     Gram Stain (Respiratory) Rare Gram positive rods    Blood culture x two cultures. Draw prior to antibiotics. [[id number]] Collected:  10/26/18 2348    Order Status:  Completed Specimen:  Blood from Peripheral, Left  Arm Updated:  11/01/18 1212     Blood Culture, Routine No growth after 5 days.    Narrative:       Aerobic and anaerobic    Blood culture x two cultures. Draw prior to antibiotics. [[id number]] Collected:  10/26/18 2348    Order Status:  Completed Specimen:  Blood from Peripheral, Right  Wrist Updated:  11/01/18 1212     Blood Culture, Routine No growth after 5 days.    Narrative:       Aerobic and anaerobic    Culture, Body Fluid (Aerobic) w/ GS [[id number]] Collected:  10/29/18 1815    Order Status:  Completed Specimen:  Body Fluid from Pleural Fluid Updated:  10/31/18 0730     AEROBIC CULTURE - FLUID No growth     Gram Stain Result Cytospin indicates:      Many WBC's      No organisms seen    Urine culture [[id number]] Collected:  10/27/18 2330    Order Status:  Completed Specimen:  Urine Updated:  10/29/18 1221     Urine Culture, Routine --     COAGULASE-NEGATIVE STAPHYLOCOCCUS SPECIES  10,000 - 49,999 cfu/ml  Susceptibility testing not routinely performed.  No other significant isolate      Narrative:       Preferred Collection Type->Urine, Clean Catch         CXR: The cardiomediastinal silhouette is normal in appearance.  No pulmonary vascular congestion appreciated. There is dense airspace consolidative change present at the left lung base and there is patchy segmental airspace opacity in the left mid and upper lung zones.  Differential considerations include pneumonia and aspiration.  Underlying pleural fluid cannot be excluded.  An underlying mass would be difficult to exclude.  Serial radiography will be necessary to ensure  resolution and exclude an underlying lesion.  There is an age-indeterminate, possibly remote, right posterior 10th rib fracture.    CXR:  Unchanged small left apical pneumothorax. Unchanged moderate left and small right pleural effusions and multifocal airspace disease.    CT chest:  Small left pneumothorax status post preceding left thoracentesis.  Less than 10%.  Moderate left and small right pleural effusions and moderate left lung and mild right lower lobe consolidation/atelectasis.  Enlarged prevascular mediastinal lymph node.  Additional nodular opacities at the cardiac apex which may represent additional prominent lymph nodes, or possibly pleural based nodules.  Moderate emphysema.  A distinct lung mass is not visible however follow-up to resolution of the pulmonary consolidation is recommended to evaluate for on the underlying lung mass, considering the enlarged mediastinal lymph node.  Further evaluation with CT chest with contrast may also be useful if the patient can receive IV contrast. 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    CXR: Unchanged small left apical pneumothorax. Unchanged moderate left and small right pleural effusions and multifocal airspace disease.    CXR: Continued dense infiltrate in the left mid and lower lung field with atelectasis and moderate to large left pleural effusion which obscures the heart size.  Small infiltrate at the right lung base and small right pleural effusion as well.    ECHO:  · Left ventricle ejection fraction is at 60%  · No wall motion abnormalities.  · Grade I (mild) left ventricular diastolic dysfunction consistent with impaired relaxation.  · LA pressure is normal.  · Mitral valve is mildly sclerotic  · There is a moderate left pleural effusion.  · Technically very difficult study    Assessment/Plan:     *  Acute respiratory failure with hypoxia  Large left para-pneumonic effusion s/p thoracentesis     Presented with increased SOB x 3 weeks.  Oxygen saturation noted at 89%, recovered well with supplemental oxygen via NC.  Likely due to pneumonia of LLL.  Pt is a daily smoker.  Likely has COPD, although she is not currently treated with chronic meds. Sputum growing Stenotrophomonas sp.  Continue supplemental oxygen as needed.  Nebulizer treatments   Monitor and treat as clinically indicated.  Follow Dr. Ravi and Dr. Abdi's recommendations.   Start Lasix 20 mg po q day and monitor renal panel.  Await Dr. Jones evaluation for VATS/Pleurodesis options.      Tobacco abuse     Smoking cessation counseling performed. Dangers of cigarette smoking were reviewed with patient in detail and patient was encouraged to quit.      Generalized anxiety disorder     Chronic; currently controlled.  Continue home regimen.      Essential hypertension     Chronic; stable.  Continue chronic meds.      Hypothyroidism due to Hashimoto's thyroiditis     Chronic; pt states controlled.  Continue levothyroxine.      Pneumonia of left lower lobe due to infectious organism     Presents with SOB; LLL pneumonia identified on CXR.    Initiate treatment regimen for CAP, which includes a beta lactam and macrolide.   IV Rocephin and IV azithromycin.  Nebulizer treatments     Discussed with patient's .   VTE Risk Mitigation (From admission, onward)        Ordered     enoxaparin injection 40 mg  Daily      10/29/18 1231     IP VTE HIGH RISK PATIENT  Once      10/27/18 0207     Place EVELYNE hose  Until discontinued      10/27/18 0207     Place sequential compression device  Until discontinued      10/27/18 0207        Vicki Suarez MD  Department of Hospital Medicine   [facility]    Addendum: Pleural fluid cytology results suggesting adenocarcinoma. Discussed with Dr. Enrique and Dr. Jones who is planning for VATS and will obtain  tissue diagnosis.

## 2018-11-02 NOTE — PLAN OF CARE
Problem: Patient Care Overview  Goal: Plan of Care Review  Outcome: Ongoing (interventions implemented as appropriate)  Oxygen therapy & respiratory TX ordered and maintained. Per report, does not want to participate in physical therapy. New orders to up to chair BID placed. Antibiotic therapy in progress. Moves independently in bed. Will continue to monitor.

## 2018-11-02 NOTE — PLAN OF CARE
Problem: Physical Therapy Goal  Goal: Physical Therapy Goal  Goals to be met by: 2018     Patient will increase functional independence with mobility by performin. Supine to sit with Modified [place]  2. Sit to stand transfer with Contact Guard Assistance  3. Bed to chair transfer with Contact Guard Assistance using Rolling Walker  4. Gait  x 250 feet with Contact Guard Assistance using Rolling Walker.   5. Lower extremity exercise program x20 reps per handout, with assistance as needed     Outcome: Ongoing (interventions implemented as appropriate)  PT for bed mobility and transfer training (OOB to chair with min A)

## 2018-11-02 NOTE — PROGRESS NOTES
11/01/18 4602   Patient Assessment/Suction   Level of Consciousness (AVPU) alert   Respiratory Effort Normal;Unlabored   Expansion/Accessory Muscles/Retractions no use of accessory muscles;no retractions;expansion symmetric   All Lung Fields Breath Sounds diminished   Cough Frequency infrequent   PRE-TX-O2-ETCO2   O2 Device (Oxygen Therapy) nasal cannula   Flow (L/min) 3   Oxygen Concentration (%) 32   SpO2 (!) 94 %   Pulse Oximetry Type Intermittent   Pulse 71   Resp 18   Aerosol Therapy   $ Aerosol Therapy Charges Aerosol Treatment   Respiratory Treatment Status given   SVN/Inhaler Treatment Route mask   Position During Treatment HOB at 45 degrees   Patient Tolerance good   Post-Treatment   Post-treatment Heart Rate (beats/min) 71   Post-treatment Resp Rate (breaths/min) 17   All Fields Breath Sounds unchanged   Vibratory PEP Therapy   $ Vibratory PEP Charges Aerobika Therapy  (refused)   Ready to Wean/Extubation Screen   FIO2<=50 (chart decimal) 0.32

## 2018-11-02 NOTE — PLAN OF CARE
Problem: Patient Care Overview  Goal: Plan of Care Review  Pt disoriented to place and situation. Denies SOB. Chest xray completed today. Good Comfort level established. POC reviewed with pt and spouse, verbalized understanding. VSS, IV was changed due to leaking at site. Bed locked and in low position, call light within reach, will continue to monitor.

## 2018-11-02 NOTE — PT/OT/SLP PROGRESS
Physical Therapy Treatment    Patient Name:  Bertha Kelly   MRN:  [id number]    Recommendations:     Discharge Recommendations:  home health PT, home health OT     Assessment:     Bertha Kelly is a 68 y.o. female admitted with a medical diagnosis of Acute respiratory failure with hypoxia.  She presents with the following impairments/functional limitations:  weakness, impaired endurance, impaired self care skills, impaired functional mobilty, gait instability, impaired balance, decreased safety awareness, impaired cardiopulmonary response to activity .    Rehab Prognosis:  fair; patient would benefit from acute skilled PT services to address these deficits and reach maximum level of function.      Recent Surgery: * No surgery found *      Plan:     During this hospitalization, patient to be seen 6 x/week to address the above listed problems via gait training, therapeutic activities, therapeutic exercises  · Plan of Care Expires:  11/30/18   Plan of Care Reviewed with: patient    Subjective     Communicated with nurse Linder prior to session.  Patient found supine upon PT entry to room, agreeable to treatment.      Chief Complaint: faituge and wants rest  Patient comments/goals: agreeable to get out of bed to chair  Pain/Comfort:  · Pain Rating 1: 0/10    Patients cultural, spiritual, Zoroastrianism conflicts given the current situation:      Objective:     Patient found with: montgomery catheter, oxygen, peripheral IV, telemetry     General Precautions: Standard, fall, respiratory   Orthopedic Precautions:N/A   Braces: N/A     Functional Mobility:  · Bed Mobility:     · Scooting: contact guard assistance  · Supine to Sit: minimum assistance    · Transfers:     · Sit to Stand:  minimum assistance with no AD  · Bed to Chair: minimum assistance with  no AD  using  Stand Pivot    · Gait: few steps to chair with min A; no AD        Patient left up in chair with all lines intact, call button in reach and nurse mario  notified..    GOALS:   Multidisciplinary Problems     Physical Therapy Goals        Problem: Physical Therapy Goal    Goal Priority Disciplines Outcome Goal Variances Interventions   Physical Therapy Goal     PT, PT/OT Ongoing (interventions implemented as appropriate)     Description:  Goals to be met by: 2018     Patient will increase functional independence with mobility by performin. Supine to sit with Modified [place]  2. Sit to stand transfer with Contact Guard Assistance  3. Bed to chair transfer with Contact Guard Assistance using Rolling Walker  4. Gait  x 250 feet with Contact Guard Assistance using Rolling Walker.   5. Lower extremity exercise program x20 reps per handout, with assistance as needed                      Time Tracking:     PT Received On: 18  PT Start Time: 1000     PT Stop Time: 1012  PT Total Time (min): 12 min     Billable Minutes: Therapeutic Activity 12    Treatment Type: Treatment  PT/PTA: PTA     PTA Visit Number: 2     Kyra Houston, CE  2018

## 2018-11-02 NOTE — PROGRESS NOTES
"Progress Note  Pulmonary/Critical Care      Admit Date: 10/26/2018      SUBJECTIVE:      Patient ID: Bertha Kelly is a 68 y.o. female.    HPI/Interval history (See H&P for complete P,F,SHx) :     The patient has pleural fluid positive for adenocarcinoma. She is awaiting a pleurodesis with Dr Jones. If possible more tissue would be helpful for the oncologist.    ROS  General: Anxious  Eyes: Vision is good.  ENT:  No sinusitis or pharyngitis.   Heart:: No chest pain or palpitations.  Lungs: Some cough, no sputum, or wheezing.  GI: No Nausea, vomiting, constipation, diarrhea, or reflux.  : No dysuria, hesitancy, or nocturia.  Skin: No lesions or rashes.  Musculoskeletal: No joint pain or myalgias.  Neuro: No headaches or neuropathy.  Lymph: No edema or adenopathy.  Psych: No anxiety or depression.  Endo: Losing weight.      OBJECTIVE:     Vital Signs Range (Last 24H):  Temp:  [98.4 °F (36.9 °C)-99.4 °F (37.4 °C)]   Pulse:  [71-87]   Resp:  [16-20]   BP: (111-131)/(51-63)   SpO2:  [92 %-95 %]     I & O (Last 24H):    Intake/Output Summary (Last 24 hours) at 11/2/2018 1011  Last data filed at 11/2/2018 0600  Gross per 24 hour   Intake 820 ml   Output 1600 ml   Net -780 ml        Estimated body mass index is 22.38 kg/m² as calculated from the following:    Height as of this encounter: 5' 3" (1.6 m).    Weight as of this encounter: 57.3 kg (126 lb 5.2 oz).    Physical Exam  GENERAL: Older patient in no distress. Very anxious appearing.  HEENT: Pupils equal and reactive. Extraocular movements intact. Nose intact.      Pharynx moist.  NECK: Supple.   HEART: Regular rate and rhythm. No murmur or gallop auscultated.  LUNGS: There are decreased breath sounds in the left base. No change in fremitus. No adventitial noises.  ABDOMEN: Bowel sounds present. Non-tender, no masses palpated.  : Normal anatomy.  EXTREMITIES: Normal muscle tone and joint movement, no cyanosis or clubbing.   LYMPHATICS: No adenopathy palpated, no " edema.  SKIN: Dry, intact, no lesions.   NEURO: Cranial nerves II-XII intact. Motor strength 5/5 bilaterally, upper and lower extremities.  PSYCH: Anxious    Laboratory/Diagnostic Data:    Recent Labs   Lab 11/02/18  0535   WBC 9.70   HGB 14.1   HCT 42.7         K 3.8   CL 88*   CO2 35*   BUN 9   CREATININE 0.7   *   *   PROT 6.4   ALBUMIN 2.4*   BILITOT 0.3   PHOS 3.7      No results for input(s): PT, INR, APTT in the last 72 hours.  Recent Labs   Lab 10/31/18  0609   *       Microbiology:    Microbiology Results (last 7 days)     Procedure Component Value Units Date/Time    Culture, Respiratory with Gram Stain [[id number]]  (Susceptibility) Collected:  10/29/18 1401    Order Status:  Completed Specimen:  Respiratory from Sputum, Expectorated Updated:  11/01/18 1313     Respiratory Culture --     CANDIDA ALBICANS  Moderate       Respiratory Culture --     STENOTROPHOMONAS (X.) MALTOPHILIA  Rare       Gram Stain (Respiratory) <10 epithelial cells per low power field.     Gram Stain (Respiratory) Few WBC's     Gram Stain (Respiratory) Moderate yeast     Gram Stain (Respiratory) Rare Gram positive rods    Blood culture x two cultures. Draw prior to antibiotics. [[id number]] Collected:  10/26/18 2348    Order Status:  Completed Specimen:  Blood from Peripheral, Left  Arm Updated:  11/01/18 1212     Blood Culture, Routine No growth after 5 days.    Narrative:       Aerobic and anaerobic    Blood culture x two cultures. Draw prior to antibiotics. [[id number]] Collected:  10/26/18 2348    Order Status:  Completed Specimen:  Blood from Peripheral, Right  Wrist Updated:  11/01/18 1212     Blood Culture, Routine No growth after 5 days.    Narrative:       Aerobic and anaerobic    Culture, Body Fluid (Aerobic) w/ GS [[id number]] Collected:  10/29/18 1815    Order Status:  Completed Specimen:  Body Fluid from Pleural Fluid Updated:  10/31/18 0730     AEROBIC CULTURE - FLUID No growth     Gram  Stain Result Cytospin indicates:      Many WBC's      No organisms seen    Urine culture [[id number]] Collected:  10/27/18 2330    Order Status:  Completed Specimen:  Urine Updated:  10/29/18 1221     Urine Culture, Routine --     COAGULASE-NEGATIVE STAPHYLOCOCCUS SPECIES  10,000 - 49,999 cfu/ml  Susceptibility testing not routinely performed.  No other significant isolate      Narrative:       Preferred Collection Type->Urine, Clean Catch      Cytology:  The cytopathologic features and immunohistochemistry profile of the malignant cells seen in the left  pleural fluid are consistent with a primary lung adenocarcinoma.     CT chest :  Small left pneumothorax status post preceding left thoracentesis.  Less than 10%.    Moderate left and small right pleural effusions and moderate left lung and mild right lower lobe consolidation/atelectasis.    Enlarged prevascular mediastinal lymph node.  Additional nodular opacities at the cardiac apex which may represent additional prominent lymph nodes, or possibly pleural based nodules.    Moderate emphysema.    A distinct lung mass is not visible however follow-up to resolution of the pulmonary consolidation is recommended to evaluate for on the underlying lung mass, considering the enlarged mediastinal lymph node.  Further evaluation with CT chest with contrast may also be useful if the patient can receive IV contrast.    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    ASSESSMENT/PLAN:     Active Problems:    Stage IV adenocarcinoma of the lung  Acute respiratory failure  Tobacco abuse  Anxiety  Hypothyroidism    Hopeful pleurodesis by Dr. Jones with harvesting of more tissue for oncology  Consult oncology  Discussed with Dr. Suarez

## 2018-11-02 NOTE — PROGRESS NOTES
Pt feels better, W/U in progress. ECHO TDS.LVEF on contrast study 60%.Rest of the structures not visualized

## 2018-11-02 NOTE — PROGRESS NOTES
11/02/18 0714   Patient Assessment/Suction   Level of Consciousness (AVPU) alert   Respiratory Effort Normal;Unlabored   All Lung Fields Breath Sounds diminished   Cough Type good;nonproductive   PRE-TX-O2-ETCO2   O2 Device (Oxygen Therapy) nasal cannula w/ humidification   $ Is the patient on Low Flow Oxygen? Yes   Flow (L/min) 3   Oxygen Concentration (%) 32   SpO2 (!) 93 %   Pulse Oximetry Type Intermittent   $ Pulse Oximetry - Multiple Charge Pulse Oximetry - Multiple   Pulse 79   Resp 18   Aerosol Therapy   $ Aerosol Therapy Charges Aerosol Treatment   Respiratory Treatment Status given   SVN/Inhaler Treatment Route mask   Position During Treatment HOB at 45 degrees   Patient Tolerance good   Post-Treatment   Post-treatment Heart Rate (beats/min) 80   Post-treatment Resp Rate (breaths/min) 18   All Fields Breath Sounds unchanged   Vibratory PEP Therapy   $ Vibratory PEP Charges Aerobika Therapy   $ Vibratory PEP Tech Time Charges 15 min   Type Oscillating PEP Therapy   Administration aerobika   Number of Repetitions 10   Duoneb Q8, Acapella Q4w/a, pt has to be coached to do well on acapella, vitals as charted, tolerated tx well.

## 2018-11-03 LAB
ALBUMIN SERPL BCP-MCNC: 2.5 G/DL
ALP SERPL-CCNC: 160 U/L
ALT SERPL W/O P-5'-P-CCNC: 157 U/L
ANION GAP SERPL CALC-SCNC: 12 MMOL/L
AST SERPL-CCNC: 178 U/L
BACTERIA FLD AEROBE CULT: NO GROWTH
BASOPHILS # BLD AUTO: 0 K/UL
BASOPHILS NFR BLD: 0 %
BILIRUB SERPL-MCNC: 0.3 MG/DL
BUN SERPL-MCNC: 10 MG/DL
CALCIUM SERPL-MCNC: 10 MG/DL
CHLORIDE SERPL-SCNC: 87 MMOL/L
CO2 SERPL-SCNC: 37 MMOL/L
CREAT SERPL-MCNC: 0.7 MG/DL
DIFFERENTIAL METHOD: ABNORMAL
EOSINOPHIL # BLD AUTO: 0.2 K/UL
EOSINOPHIL NFR BLD: 2 %
ERYTHROCYTE [DISTWIDTH] IN BLOOD BY AUTOMATED COUNT: 13.5 %
EST. GFR  (AFRICAN AMERICAN): >60 ML/MIN/1.73 M^2
EST. GFR  (NON AFRICAN AMERICAN): >60 ML/MIN/1.73 M^2
GLUCOSE SERPL-MCNC: 109 MG/DL
GRAM STN SPEC: NORMAL
HCT VFR BLD AUTO: 43.6 %
HGB BLD-MCNC: 14.4 G/DL
LYMPHOCYTES # BLD AUTO: 1.1 K/UL
LYMPHOCYTES NFR BLD: 11.3 %
MAGNESIUM SERPL-MCNC: 1.9 MG/DL
MCH RBC QN AUTO: 29.8 PG
MCHC RBC AUTO-ENTMCNC: 33 G/DL
MCV RBC AUTO: 90 FL
MONOCYTES # BLD AUTO: 1.1 K/UL
MONOCYTES NFR BLD: 11.1 %
NEUTROPHILS # BLD AUTO: 7.2 K/UL
NEUTROPHILS NFR BLD: 75.6 %
PHOSPHATE SERPL-MCNC: 3.8 MG/DL
PLATELET # BLD AUTO: 286 K/UL
PMV BLD AUTO: 9.3 FL
POTASSIUM SERPL-SCNC: 3.6 MMOL/L
PROT SERPL-MCNC: 6.9 G/DL
RBC # BLD AUTO: 4.82 M/UL
SODIUM SERPL-SCNC: 136 MMOL/L
WBC # BLD AUTO: 9.5 K/UL

## 2018-11-03 PROCEDURE — 94640 AIRWAY INHALATION TREATMENT: CPT

## 2018-11-03 PROCEDURE — 85025 COMPLETE CBC W/AUTO DIFF WBC: CPT

## 2018-11-03 PROCEDURE — 25000003 PHARM REV CODE 250: Performed by: NURSE PRACTITIONER

## 2018-11-03 PROCEDURE — 25000003 PHARM REV CODE 250: Performed by: INTERNAL MEDICINE

## 2018-11-03 PROCEDURE — 99232 SBSQ HOSP IP/OBS MODERATE 35: CPT | Mod: ,,, | Performed by: INTERNAL MEDICINE

## 2018-11-03 PROCEDURE — 94664 DEMO&/EVAL PT USE INHALER: CPT

## 2018-11-03 PROCEDURE — 25000242 PHARM REV CODE 250 ALT 637 W/ HCPCS: Performed by: INTERNAL MEDICINE

## 2018-11-03 PROCEDURE — 94761 N-INVAS EAR/PLS OXIMETRY MLT: CPT

## 2018-11-03 PROCEDURE — 83735 ASSAY OF MAGNESIUM: CPT

## 2018-11-03 PROCEDURE — 12000002 HC ACUTE/MED SURGE SEMI-PRIVATE ROOM

## 2018-11-03 PROCEDURE — 27000221 HC OXYGEN, UP TO 24 HOURS

## 2018-11-03 PROCEDURE — 84100 ASSAY OF PHOSPHORUS: CPT

## 2018-11-03 PROCEDURE — 97116 GAIT TRAINING THERAPY: CPT

## 2018-11-03 PROCEDURE — 80053 COMPREHEN METABOLIC PANEL: CPT

## 2018-11-03 PROCEDURE — 36415 COLL VENOUS BLD VENIPUNCTURE: CPT

## 2018-11-03 PROCEDURE — 99900035 HC TECH TIME PER 15 MIN (STAT)

## 2018-11-03 PROCEDURE — 63600175 PHARM REV CODE 636 W HCPCS: Performed by: INTERNAL MEDICINE

## 2018-11-03 RX ADMIN — AMITRIPTYLINE HYDROCHLORIDE 25 MG: 25 TABLET, FILM COATED ORAL at 08:11

## 2018-11-03 RX ADMIN — IPRATROPIUM BROMIDE AND ALBUTEROL SULFATE 3 ML: .5; 3 SOLUTION RESPIRATORY (INHALATION) at 12:11

## 2018-11-03 RX ADMIN — METOPROLOL TARTRATE 50 MG: 50 TABLET ORAL at 08:11

## 2018-11-03 RX ADMIN — IPRATROPIUM BROMIDE AND ALBUTEROL SULFATE 3 ML: .5; 3 SOLUTION RESPIRATORY (INHALATION) at 03:11

## 2018-11-03 RX ADMIN — CEFTRIAXONE 1 G: 1 INJECTION, SOLUTION INTRAVENOUS at 04:11

## 2018-11-03 RX ADMIN — IPRATROPIUM BROMIDE AND ALBUTEROL SULFATE 3 ML: .5; 3 SOLUTION RESPIRATORY (INHALATION) at 07:11

## 2018-11-03 RX ADMIN — ENOXAPARIN SODIUM 40 MG: 100 INJECTION SUBCUTANEOUS at 08:11

## 2018-11-03 RX ADMIN — ESCITALOPRAM 20 MG: 10 TABLET, FILM COATED ORAL at 08:11

## 2018-11-03 RX ADMIN — PANTOPRAZOLE SODIUM 40 MG: 40 TABLET, DELAYED RELEASE ORAL at 08:11

## 2018-11-03 RX ADMIN — FUROSEMIDE 20 MG: 20 TABLET ORAL at 08:11

## 2018-11-03 RX ADMIN — LEVOTHYROXINE SODIUM 50 MCG: 50 TABLET ORAL at 07:11

## 2018-11-03 NOTE — PLAN OF CARE
11/03/18 0714   Patient Assessment/Suction   Level of Consciousness (AVPU) alert   Respiratory Effort Normal;Unlabored   All Lung Fields Breath Sounds diminished   PRE-TX-O2-ETCO2   O2 Device (Oxygen Therapy) nasal cannula w/ humidification   $ Is the patient on Low Flow Oxygen? Yes   Flow (L/min) 2   Oxygen Concentration (%) 28   SpO2 (!) 94 %   Pulse Oximetry Type Intermittent   $ Pulse Oximetry - Multiple Charge Pulse Oximetry - Multiple   Pulse 80   Resp 20   Aerosol Therapy   $ Aerosol Therapy Charges Aerosol Treatment   Respiratory Treatment Status given   SVN/Inhaler Treatment Route mask   Position During Treatment HOB at 30 degrees   Patient Tolerance good   Post-Treatment   Post-treatment Heart Rate (beats/min) 79   Post-treatment Resp Rate (breaths/min) 20   All Fields Breath Sounds diminished   Ready to Wean/Extubation Screen   FIO2<=50 (chart decimal) 0.28   Pt also has acapella therapy.

## 2018-11-03 NOTE — CONSULTS
Consult Note  Cardiothoracic Surgery    Consults  SUBJECTIVE:     History of Present Illness:  Patient is a 68 y.o. female presents with increasing SOB over last several weeks. Has had L thoracentesis and cytology consistent with adenocarcinoma. Referred for biopsy/pleurodesis    Scheduled Meds:   albuterol-ipratropium  3 mL Nebulization Q8H    amitriptyline  25 mg Oral QHS    cefTRIAXone (ROCEPHIN) IVPB  1 g Intravenous Q24H    enoxaparin  40 mg Subcutaneous Daily    escitalopram oxalate  20 mg Oral Daily    furosemide  20 mg Oral Daily    levothyroxine  50 mcg Oral Before breakfast    metoprolol tartrate  50 mg Oral BID    nicotine  1 patch Transdermal Daily    pantoprazole  40 mg Oral Daily     Infusions/Drips:  PRN Meds:acetaminophen, dextrose 50%, dextrose 50%, glucagon (human recombinant), glucose, glucose, LORazepam, ondansetron, sodium chloride 0.9%    Review of patient's allergies indicates:   Allergen Reactions    Codeine Hives    Penicillins Hives       Past Medical History:   Diagnosis Date    Hypertension     Thyroid disease      Past Surgical History:   Procedure Laterality Date    EYE SURGERY      childhood     Family History   Problem Relation Age of Onset    Hypertension Father      Social History     Tobacco Use    Smoking status: Current Every Day Smoker     Packs/day: 0.50     Types: Cigarettes    Smokeless tobacco: Never Used   Substance Use Topics    Alcohol use: No     Frequency: Never    Drug use: No          OBJECTIVE:     Vital Signs (Most Recent)  Temp: 97.9 °F (36.6 °C) (11/02/18 1549)  Pulse: 66 (11/02/18 1549)  Resp: 18 (11/02/18 1549)  BP: 131/80 (11/02/18 1549)  SpO2: 98 % (11/02/18 1549)    Admission Weight: 54.4 kg (120 lb) (10/26/18 2105)   Most Recent Weight: 57.3 kg (126 lb 5.2 oz) (11/02/18 0655)    Vital Signs Range (Last 24H):  Temp:  [97.9 °F (36.6 °C)-99.4 °F (37.4 °C)]   Pulse:  [66-84]   Resp:  [17-18]   BP: (114-131)/(51-80)   SpO2:  [93 %-98 %]      Physical Exam:      Laboratory:  CBC:   Recent Labs   Lab 11/02/18  0535   WBC 9.70   RBC 4.73   HGB 14.1   HCT 42.7      MCV 90   MCH 29.7   MCHC 32.9     BMP:   Recent Labs   Lab 11/02/18  0535   GLU 98      K 3.8   CL 88*   CO2 35*   BUN 9   CREATININE 0.7   CALCIUM 9.7   MG 1.8     Microbiology Results (last 7 days)     Procedure Component Value Units Date/Time    Culture, Respiratory with Gram Stain [[id number]]  (Susceptibility) Collected:  10/29/18 1401    Order Status:  Completed Specimen:  Respiratory from Sputum, Expectorated Updated:  11/01/18 1313     Respiratory Culture --     CANDIDA ALBICANS  Moderate       Respiratory Culture --     STENOTROPHOMONAS (X.) MALTOPHILIA  Rare       Gram Stain (Respiratory) <10 epithelial cells per low power field.     Gram Stain (Respiratory) Few WBC's     Gram Stain (Respiratory) Moderate yeast     Gram Stain (Respiratory) Rare Gram positive rods    Blood culture x two cultures. Draw prior to antibiotics. [[id number]] Collected:  10/26/18 2348    Order Status:  Completed Specimen:  Blood from Peripheral, Left  Arm Updated:  11/01/18 1212     Blood Culture, Routine No growth after 5 days.    Narrative:       Aerobic and anaerobic    Blood culture x two cultures. Draw prior to antibiotics. [[id number]] Collected:  10/26/18 2348    Order Status:  Completed Specimen:  Blood from Peripheral, Right  Wrist Updated:  11/01/18 1212     Blood Culture, Routine No growth after 5 days.    Narrative:       Aerobic and anaerobic    Culture, Body Fluid (Aerobic) w/ GS [[id number]] Collected:  10/29/18 1815    Order Status:  Completed Specimen:  Body Fluid from Pleural Fluid Updated:  10/31/18 0730     AEROBIC CULTURE - FLUID No growth     Gram Stain Result Cytospin indicates:      Many WBC's      No organisms seen    Urine culture [[id number]] Collected:  10/27/18 2330    Order Status:  Completed Specimen:  Urine Updated:  10/29/18 1221     Urine Culture, Routine --      COAGULASE-NEGATIVE STAPHYLOCOCCUS SPECIES  10,000 - 49,999 cfu/ml  Susceptibility testing not routinely performed.  No other significant isolate      Narrative:       Preferred Collection Type->Urine, Clean Catch        Specimen (12h ago, onward)    None          Diagnostic Results:  ECG: Reviewed  X-Ray: Reviewed  CT: Reviewed    ASSESSMENT/PLAN:     Impression Malignant pleural effusion    Recommend : VATS/ Obtain tissue for path/Pleurodesis                            Discussed with patient at bedside and with  over phone                           Tentatively plan for Monday if all agree      Thank you for the consult

## 2018-11-03 NOTE — PT/OT/SLP PROGRESS
Physical Therapy Treatment    Patient Name:  Bertha Kelly   MRN:  [id number]    Recommendations:     Discharge Recommendations:          Assessment:     Bertha Kelly is a 68 y.o. female admitted with a medical diagnosis of Acute respiratory failure with hypoxia.  She presents with the following impairments/functional limitations:  weakness, impaired endurance, impaired self care skills, impaired functional mobilty, gait instability, impaired cardiopulmonary response to activity .  No LOB with activity.  VC to breathe in through nose during ambulation.    Rehab Prognosis:  ; patient would benefit from acute skilled PT services to address these deficits and reach maximum level of function.      Recent Surgery: * No surgery found *      Plan:     During this hospitalization, patient to be seen 6 x/week to address the above listed problems via gait training, therapeutic activities, therapeutic exercises  · Plan of Care Expires:  11/30/18   Plan of Care Reviewed with: patient    Subjective     Communicated with nurse Alonso prior to session.  Patient found seated in bsc upon entry upon PT entry to room, agreeable to treatment.      Chief Complaint:   Patient comments/goals: agreeable to tx  Pain/Comfort:  ·      Patients cultural, spiritual, Jain conflicts given the current situation:      Objective:     Patient found with: telemetry, oxygen     General Precautions: Standard, fall   Orthopedic Precautions:N/A   Braces:       Functional Mobility:  · Bed Mobility:     · Supine to Sit: stand by assistance  · Transfers:     · Sit to Stand:  stand by assistance with rolling walker  · Gait: 100' w/rw, cga, 3LO2      AM-PAC 6 CLICK MOBILITY          Therapeutic Activities and Exercises:  Pt stood at bs, sba, while rw and other equipment was readied.      Patient left HOB elevated with all lines intact, call button in reach and spouse present..    GOALS:   Multidisciplinary Problems     Physical Therapy Goals         Problem: Physical Therapy Goal    Goal Priority Disciplines Outcome Goal Variances Interventions   Physical Therapy Goal     PT, PT/OT Ongoing (interventions implemented as appropriate)     Description:  Goals to be met by: 2018     Patient will increase functional independence with mobility by performin. Supine to sit with Modified [place]  2. Sit to stand transfer with Contact Guard Assistance  3. Bed to chair transfer with Contact Guard Assistance using Rolling Walker  4. Gait  x 250 feet with Contact Guard Assistance using Rolling Walker.   5. Lower extremity exercise program x20 reps per handout, with assistance as needed                      Time Tracking:     PT Received On: 18  PT Start Time: 1025     PT Stop Time: 1040  PT Total Time (min): 15 min     Billable Minutes: Gait Training 15    Treatment Type: Treatment  PT/PTA: PTA     PTA Visit Number: 2     Lian Rene PTA  2018

## 2018-11-03 NOTE — PROGRESS NOTES
"Progress Note  Pulmonary/Critical Care      Admit Date: 10/26/2018      SUBJECTIVE:      Patient ID: Bertha Kelly is a 68 y.o. female.    HPI/Interval history (See H&P for complete P,F,SHx) :     The patient is aware of her diagnosis of adenocarcinoma of the lung, stage 4.  She is awaiting surgery with Dr. Jones on Monday.    ROS  General: Anxious  Eyes: Vision is good.  ENT:  No sinusitis or pharyngitis.   Heart:: No chest pain or palpitations.  Lungs: Some cough, no sputum, or wheezing.  GI: No Nausea, vomiting, constipation, diarrhea, or reflux.  : Patient does not want her montgomery out.  Skin: No lesions or rashes.  Musculoskeletal: No joint pain or myalgias.  Neuro: No headaches or neuropathy.  Lymph: No edema or adenopathy.  Psych: No anxiety or depression.  Endo: Losing weight.      OBJECTIVE:     Vital Signs Range (Last 24H):  Temp:  [97.8 °F (36.6 °C)-99.4 °F (37.4 °C)]   Pulse:  [66-85]   Resp:  [16-20]   BP: (122-146)/(57-99)   SpO2:  [93 %-98 %]     I & O (Last 24H):    Intake/Output Summary (Last 24 hours) at 11/3/2018 0806  Last data filed at 11/3/2018 0700  Gross per 24 hour   Intake 170 ml   Output 1875 ml   Net -1705 ml        Estimated body mass index is 22.38 kg/m² as calculated from the following:    Height as of this encounter: 5' 3" (1.6 m).    Weight as of this encounter: 57.3 kg (126 lb 5.2 oz).    Physical Exam  GENERAL: Older patient in no distress. Very anxious appearing.  HEENT: Pupils equal and reactive. Extraocular movements intact. Nose intact.      Pharynx moist.  NECK: Supple.   HEART: Regular rate and rhythm. No murmur or gallop auscultated.  LUNGS: There are decreased breath sounds in the left base. No change in fremitus. No adventitial noises.  ABDOMEN: Bowel sounds present. Non-tender, no masses palpated.  : Normal anatomy.  EXTREMITIES: Normal muscle tone and joint movement, no cyanosis or clubbing.   LYMPHATICS: No adenopathy palpated, no edema.  SKIN: Dry, intact, no " lesions.   NEURO: Cranial nerves II-XII intact. Motor strength 5/5 bilaterally, upper and lower extremities.  PSYCH: Anxious    Laboratory/Diagnostic Data:    Recent Labs   Lab 11/03/18  0453   WBC 9.50   HGB 14.4   HCT 43.6         K 3.6   CL 87*   CO2 37*   BUN 10   CREATININE 0.7   *   *   PROT 6.9   ALBUMIN 2.5*   BILITOT 0.3   PHOS 3.8      microbiology:    Microbiology Results (last 7 days)     Procedure Component Value Units Date/Time    Culture, Respiratory with Gram Stain [[id number]]  (Susceptibility) Collected:  10/29/18 1401    Order Status:  Completed Specimen:  Respiratory from Sputum, Expectorated Updated:  11/01/18 1313     Respiratory Culture --     CANDIDA ALBICANS  Moderate       Respiratory Culture --     STENOTROPHOMONAS (X.) MALTOPHILIA  Rare       Gram Stain (Respiratory) <10 epithelial cells per low power field.     Gram Stain (Respiratory) Few WBC's     Gram Stain (Respiratory) Moderate yeast     Gram Stain (Respiratory) Rare Gram positive rods    Blood culture x two cultures. Draw prior to antibiotics. [[id number]] Collected:  10/26/18 2348    Order Status:  Completed Specimen:  Blood from Peripheral, Left  Arm Updated:  11/01/18 1212     Blood Culture, Routine No growth after 5 days.    Narrative:       Aerobic and anaerobic    Blood culture x two cultures. Draw prior to antibiotics. [[id number]] Collected:  10/26/18 2348    Order Status:  Completed Specimen:  Blood from Peripheral, Right  Wrist Updated:  11/01/18 1212     Blood Culture, Routine No growth after 5 days.    Narrative:       Aerobic and anaerobic    Culture, Body Fluid (Aerobic) w/ GS [[id number]] Collected:  10/29/18 1815    Order Status:  Completed Specimen:  Body Fluid from Pleural Fluid Updated:  10/31/18 0730     AEROBIC CULTURE - FLUID No growth     Gram Stain Result Cytospin indicates:      Many WBC's      No organisms seen    Urine culture [[id number]] Collected:  10/27/18 2330    Order Status:   Completed Specimen:  Urine Updated:  10/29/18 1221     Urine Culture, Routine --     COAGULASE-NEGATIVE STAPHYLOCOCCUS SPECIES  10,000 - 49,999 cfu/ml  Susceptibility testing not routinely performed.  No other significant isolate      Narrative:       Preferred Collection Type->Urine, Clean Catch      Cytology:  The cytopathologic features and immunohistochemistry profile of the malignant cells seen in the left  pleural fluid are consistent with a primary lung adenocarcinoma.         ASSESSMENT/PLAN:     Active Problems:    Stage IV adenocarcinoma of the lung  Acute respiratory failure  Tobacco abuse  Anxiety  Hypothyroidism    Dr. Abdi will continue to follow on Monday  Please call if I can be of assistance

## 2018-11-03 NOTE — PROGRESS NOTES
Progress Note  Hospital Medicine  Patient Name:Bertha Kelly  MRN:  [id number]  Patient Class: IP- Inpatient  Admit Date: 10/26/2018  Length of Stay: 7 days  Expected Discharge Date:   Attending Physician: Vicki Suarez MD  Primary Care Provider:  James Colon MD    SUBJECTIVE:     Principal Problem: Acute respiratory failure with hypoxia  Initial history of present illness: Bertha Kelly is a 69 y/o female with a PMHx of HTN and thyroid disease who presented to the ED today with c/o SOB which began 3 weeks ago and has progressively gotten worse.  Pt states that she becomes SOB with activity.  She reports that she received a pneumonia vaccine at her doctor's office about a month ago and began having some chest congestion and cough a few days later.  She is a current smoker of about 1/2 pack daily.  Reports increased fatigue and chills, but denies fever, chest pain, N/V, and dysuria.  Reports weight loss of a few pounds in the past 3 weeks due to poor appetite, but states that prior to this illness, weight was stable and she had a very good appetite.  Pt's CXR reveals a LLL pneumonia and was noted to be mildly hypoxic initially requiring supplemental oxygen.  She will be admitted to the service of hospital medicine for further treatment.    PMH/PSH/SH/FH/Meds: reviewed.    Symptoms/Review of Systems: Patient seen and examined. No acute events overnight. Feels better.   Diet:  Adequate intake.    Activity level: Up with assistance  Pain:  Patient reports no pain.       OBJECTIVE:   Vital Signs (Most Recent):      Temp: 99.2 °F (37.3 °C) (11/03/18 0827)  Pulse: 91 (11/03/18 0827)  Resp: 18 (11/03/18 0827)  BP: (!) 142/87 (11/03/18 0827)  SpO2: (!) 91 % (11/03/18 0827)       Vital Signs Range (Last 24H):  Temp:  [97.8 °F (36.6 °C)-99.2 °F (37.3 °C)]   Pulse:  [66-91]   Resp:  [16-20]   BP: (124-146)/(61-99)   SpO2:  [91 %-98 %]     Weight: 57.3 kg (126 lb 5.2 oz)  Body mass index is 22.38 kg/m².    Intake/Output  Summary (Last 24 hours) at 11/3/2018 1058  Last data filed at 11/3/2018 0700  Gross per 24 hour   Intake 170 ml   Output 1875 ml   Net -1705 ml     Physical Examination:  Constitutional: She is oriented to person, place, and time. She appears well-developed and well-nourished. No distress.   HENT:   Head: Normocephalic and atraumatic.   Eyes: Pupils are equal, round, and reactive to light.   Neck: Neck supple. No thyromegaly present.   Cardiovascular: Normal rate and regular rhythm. Exam reveals no gallop and no friction rub.   No murmur heard.  Pulmonary/Chest: No respiratory distress. She has wheezes.   Abdominal: Soft. Bowel sounds are normal. She exhibits no distension. There is no tenderness. There is no guarding.   Musculoskeletal: Normal range of motion. She exhibits no edema.   Neurological: She is alert and oriented to person, place, and time.   Skin: Skin is warm and dry. No erythema.   Psychiatric: She has a normal mood and affect.     CBC:  Recent Labs   Lab 11/01/18  0456 11/02/18  0535 11/03/18  0453   WBC 8.90 9.70 9.50   RBC 4.67 4.73 4.82   HGB 13.9 14.1 14.4   HCT 42.5 42.7 43.6    273 286   MCV 91 90 90   MCH 29.7 29.7 29.8   MCHC 32.7 32.9 33.0   BMP  Recent Labs   Lab 11/01/18 0456 11/02/18  0535 11/03/18  0453    98 109    136 136   K 4.2 3.8 3.6   CL 91* 88* 87*   CO2 38* 35* 37*   BUN 8 9 10   CREATININE 0.7 0.7 0.7   CALCIUM 9.6 9.7 10.0   MG 1.7 1.8 1.9      Diagnostic Results:  Microbiology Results (last 7 days)     Procedure Component Value Units Date/Time    Culture, Body Fluid (Aerobic) w/ GS [[id number]] Collected:  10/29/18 1815    Order Status:  Completed Specimen:  Body Fluid from Pleural Fluid Updated:  11/03/18 1039     AEROBIC CULTURE - FLUID No growth     Gram Stain Result Cytospin indicates:      Many WBC's      No organisms seen    Culture, Respiratory with Gram Stain [[id number]]  (Susceptibility) Collected:  10/29/18 1401    Order Status:  Completed  Specimen:  Respiratory from Sputum, Expectorated Updated:  11/01/18 1313     Respiratory Culture --     CANDIDA ALBICANS  Moderate       Respiratory Culture --     STENOTROPHOMONAS (X.) MALTOPHILIA  Rare       Gram Stain (Respiratory) <10 epithelial cells per low power field.     Gram Stain (Respiratory) Few WBC's     Gram Stain (Respiratory) Moderate yeast     Gram Stain (Respiratory) Rare Gram positive rods    Blood culture x two cultures. Draw prior to antibiotics. [[id number]] Collected:  10/26/18 2348    Order Status:  Completed Specimen:  Blood from Peripheral, Left  Arm Updated:  11/01/18 1212     Blood Culture, Routine No growth after 5 days.    Narrative:       Aerobic and anaerobic    Blood culture x two cultures. Draw prior to antibiotics. [[id number]] Collected:  10/26/18 2348    Order Status:  Completed Specimen:  Blood from Peripheral, Right  Wrist Updated:  11/01/18 1212     Blood Culture, Routine No growth after 5 days.    Narrative:       Aerobic and anaerobic    Urine culture [[id number]] Collected:  10/27/18 2330    Order Status:  Completed Specimen:  Urine Updated:  10/29/18 1221     Urine Culture, Routine --     COAGULASE-NEGATIVE STAPHYLOCOCCUS SPECIES  10,000 - 49,999 cfu/ml  Susceptibility testing not routinely performed.  No other significant isolate      Narrative:       Preferred Collection Type->Urine, Clean Catch         CXR: The cardiomediastinal silhouette is normal in appearance.  No pulmonary vascular congestion appreciated. There is dense airspace consolidative change present at the left lung base and there is patchy segmental airspace opacity in the left mid and upper lung zones.  Differential considerations include pneumonia and aspiration.  Underlying pleural fluid cannot be excluded.  An underlying mass would be difficult to exclude.  Serial radiography will be necessary to ensure resolution and exclude an underlying lesion.  There is an age-indeterminate, possibly remote, right  posterior 10th rib fracture.    CXR:  Unchanged small left apical pneumothorax. Unchanged moderate left and small right pleural effusions and multifocal airspace disease.    CT chest:  Small left pneumothorax status post preceding left thoracentesis.  Less than 10%.  Moderate left and small right pleural effusions and moderate left lung and mild right lower lobe consolidation/atelectasis.  Enlarged prevascular mediastinal lymph node.  Additional nodular opacities at the cardiac apex which may represent additional prominent lymph nodes, or possibly pleural based nodules.  Moderate emphysema.  A distinct lung mass is not visible however follow-up to resolution of the pulmonary consolidation is recommended to evaluate for on the underlying lung mass, considering the enlarged mediastinal lymph node.  Further evaluation with CT chest with contrast may also be useful if the patient can receive IV contrast. 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    CXR: Unchanged small left apical pneumothorax. Unchanged moderate left and small right pleural effusions and multifocal airspace disease.    CXR: Continued dense infiltrate in the left mid and lower lung field with atelectasis and moderate to large left pleural effusion which obscures the heart size.  Small infiltrate at the right lung base and small right pleural effusion as well.    ECHO:  · Left ventricle ejection fraction is at 60%  · No wall motion abnormalities.  · Grade I (mild) left ventricular diastolic dysfunction consistent with impaired relaxation.  · LA pressure is normal.  · Mitral valve is mildly sclerotic  · There is a moderate left pleural effusion.  · Technically very difficult study    Assessment/Plan:     * Acute respiratory failure with hypoxia  Large left para-pneumonic effusion s/p thoracentesis      Presented with increased SOB x 3 weeks.  Oxygen saturation noted at 89%, recovered well with supplemental oxygen via NC.  Likely due to pneumonia of LLL.  Pt is a daily smoker.  Likely has COPD, although she is not currently treated with chronic meds. Sputum growing Stenotrophomonas sp.  Continue supplemental oxygen as needed.  Nebulizer treatments   Monitor and treat as clinically indicated.  Follow Dr. Ravi and Dr. Abdi's recommendations.   To OR Monday  Thoracentesis with confirmation of Lung Adeno      Tobacco abuse     Smoking cessation counseling performed. Dangers of cigarette smoking were reviewed with patient in detail and patient was encouraged to quit.      Generalized anxiety disorder     Chronic; currently controlled.  Continue home regimen.      Essential hypertension     Chronic; stable.  Continue chronic meds.      Hypothyroidism due to Hashimoto's thyroiditis     Chronic; pt states controlled.  Continue levothyroxine.      Pneumonia of left lower lobe due to infectious organism     Presents with SOB; LLL pneumonia identified on CXR.    Initiate treatment regimen for CAP, which includes a beta lactam and macrolide.   IV Rocephin and IV azithromycin.  Nebulizer treatments     Discussed with patient's .   VTE Risk Mitigation (From admission, onward)        Ordered     enoxaparin injection 40 mg  Daily      10/29/18 1231     IP VTE HIGH RISK PATIENT  Once      10/27/18 0207     Place EVELYNE hose  Until discontinued      10/27/18 0207     Place sequential compression device  Until discontinued      10/27/18 0207        Keith Sagastume MD  Department of Hospital Medicine   [facility]

## 2018-11-03 NOTE — PLAN OF CARE
Problem: Patient Care Overview  Goal: Plan of Care Review  Pt AAOx3 with confusion at times and slow to respond, NAD watching tv,  at bedside.  Pt admitted  with pneumonia to L. Lt FA IV infusing antibiotics as ordered without any difficulty. De Anda cath intact draining yellow urine. O2 2.5 L NC in use. Pt denies pain so far this shift, will continue to monitor.  Dr Abdi performed thoracentesis 10/29/18 and drained 1500 cc fluid. Dr Jones saw pt 11/2/18 in pm and stated that plans for VATS and pleurodesis on 11/5/18, Dr Jones notified pts  per phone call. Pt needs assistance to ambulate. Call light within pt reach, bed alarm in use, pt instructed to call staff for any needed assistance, safety maintained. Pt denies any needs/concerns at this time, will continue to monitor.

## 2018-11-03 NOTE — PLAN OF CARE
Problem: Physical Therapy Goal  Goal: Physical Therapy Goal  Goals to be met by: 2018     Patient will increase functional independence with mobility by performin. Supine to sit with Modified [place]  2. Sit to stand transfer with Contact Guard Assistance  3. Bed to chair transfer with Contact Guard Assistance using Rolling Walker  4. Gait  x 250 feet with Contact Guard Assistance using Rolling Walker.   5. Lower extremity exercise program x20 reps per handout, with assistance as needed     Outcome: Ongoing (interventions implemented as appropriate)  Pt ambulated 100' w/rw, cga, 3LO2.

## 2018-11-03 NOTE — PLAN OF CARE
Problem: Fall Risk (Adult)  Goal: Identify Related Risk Factors and Signs and Symptoms  Related risk factors and signs and symptoms are identified upon initiation of Human Response Clinical Practice Guideline (CPG)  Outcome: Ongoing (interventions implemented as appropriate)  Pt remains free from fall or injury. Denies pain or SOB. O2 sats >92% on 3L NC. Sat up in the chair a few hours today. Pt eating and drinking well. Skin remains intact, pt able to reposition self. Afebrile and VS stable. Will continue to monitor.

## 2018-11-04 LAB
ABO + RH BLD: NORMAL
ALBUMIN SERPL BCP-MCNC: 2.4 G/DL
ALP SERPL-CCNC: 147 U/L
ALT SERPL W/O P-5'-P-CCNC: 145 U/L
ANION GAP SERPL CALC-SCNC: 8 MMOL/L
AST SERPL-CCNC: 129 U/L
BASOPHILS # BLD AUTO: 0 K/UL
BASOPHILS NFR BLD: 0.4 %
BILIRUB SERPL-MCNC: 0.3 MG/DL
BLD GP AB SCN CELLS X3 SERPL QL: NORMAL
BUN SERPL-MCNC: 11 MG/DL
CALCIUM SERPL-MCNC: 9.5 MG/DL
CHLORIDE SERPL-SCNC: 89 MMOL/L
CO2 SERPL-SCNC: 39 MMOL/L
CREAT SERPL-MCNC: 0.8 MG/DL
DIFFERENTIAL METHOD: ABNORMAL
EOSINOPHIL # BLD AUTO: 0.2 K/UL
EOSINOPHIL NFR BLD: 2.7 %
ERYTHROCYTE [DISTWIDTH] IN BLOOD BY AUTOMATED COUNT: 13.6 %
EST. GFR  (AFRICAN AMERICAN): >60 ML/MIN/1.73 M^2
EST. GFR  (NON AFRICAN AMERICAN): >60 ML/MIN/1.73 M^2
GLUCOSE SERPL-MCNC: 105 MG/DL
HCT VFR BLD AUTO: 40.6 %
HGB BLD-MCNC: 13.4 G/DL
LYMPHOCYTES # BLD AUTO: 1.2 K/UL
LYMPHOCYTES NFR BLD: 15.2 %
MAGNESIUM SERPL-MCNC: 1.9 MG/DL
MCH RBC QN AUTO: 29.6 PG
MCHC RBC AUTO-ENTMCNC: 33 G/DL
MCV RBC AUTO: 90 FL
MONOCYTES # BLD AUTO: 1.1 K/UL
MONOCYTES NFR BLD: 13.8 %
NEUTROPHILS # BLD AUTO: 5.5 K/UL
NEUTROPHILS NFR BLD: 67.9 %
PHOSPHATE SERPL-MCNC: 3.8 MG/DL
PLATELET # BLD AUTO: 290 K/UL
PMV BLD AUTO: 8.5 FL
POTASSIUM SERPL-SCNC: 3.9 MMOL/L
PROT SERPL-MCNC: 6.2 G/DL
RBC # BLD AUTO: 4.51 M/UL
SODIUM SERPL-SCNC: 136 MMOL/L
WBC # BLD AUTO: 8.1 K/UL

## 2018-11-04 PROCEDURE — 25000003 PHARM REV CODE 250: Performed by: INTERNAL MEDICINE

## 2018-11-04 PROCEDURE — 80053 COMPREHEN METABOLIC PANEL: CPT

## 2018-11-04 PROCEDURE — 12000002 HC ACUTE/MED SURGE SEMI-PRIVATE ROOM

## 2018-11-04 PROCEDURE — 94664 DEMO&/EVAL PT USE INHALER: CPT

## 2018-11-04 PROCEDURE — 84100 ASSAY OF PHOSPHORUS: CPT

## 2018-11-04 PROCEDURE — 25000003 PHARM REV CODE 250: Performed by: NURSE PRACTITIONER

## 2018-11-04 PROCEDURE — 25000242 PHARM REV CODE 250 ALT 637 W/ HCPCS: Performed by: INTERNAL MEDICINE

## 2018-11-04 PROCEDURE — 27000221 HC OXYGEN, UP TO 24 HOURS

## 2018-11-04 PROCEDURE — 36415 COLL VENOUS BLD VENIPUNCTURE: CPT

## 2018-11-04 PROCEDURE — 86901 BLOOD TYPING SEROLOGIC RH(D): CPT

## 2018-11-04 PROCEDURE — 85025 COMPLETE CBC W/AUTO DIFF WBC: CPT

## 2018-11-04 PROCEDURE — 94761 N-INVAS EAR/PLS OXIMETRY MLT: CPT

## 2018-11-04 PROCEDURE — 83735 ASSAY OF MAGNESIUM: CPT

## 2018-11-04 PROCEDURE — 94640 AIRWAY INHALATION TREATMENT: CPT

## 2018-11-04 PROCEDURE — 63600175 PHARM REV CODE 636 W HCPCS: Performed by: INTERNAL MEDICINE

## 2018-11-04 RX ORDER — POLYETHYLENE GLYCOL 3350 17 G/17G
17 POWDER, FOR SOLUTION ORAL 2 TIMES DAILY
Status: DISCONTINUED | OUTPATIENT
Start: 2018-11-04 | End: 2018-11-14 | Stop reason: HOSPADM

## 2018-11-04 RX ADMIN — METOPROLOL TARTRATE 50 MG: 50 TABLET ORAL at 10:11

## 2018-11-04 RX ADMIN — CEFTRIAXONE 1 G: 1 INJECTION, SOLUTION INTRAVENOUS at 06:11

## 2018-11-04 RX ADMIN — FUROSEMIDE 20 MG: 20 TABLET ORAL at 09:11

## 2018-11-04 RX ADMIN — LEVOTHYROXINE SODIUM 50 MCG: 50 TABLET ORAL at 06:11

## 2018-11-04 RX ADMIN — POLYETHYLENE GLYCOL 3350 17 G: 17 POWDER, FOR SOLUTION ORAL at 12:11

## 2018-11-04 RX ADMIN — ESCITALOPRAM 20 MG: 10 TABLET, FILM COATED ORAL at 09:11

## 2018-11-04 RX ADMIN — AMITRIPTYLINE HYDROCHLORIDE 25 MG: 25 TABLET, FILM COATED ORAL at 10:11

## 2018-11-04 RX ADMIN — PANTOPRAZOLE SODIUM 40 MG: 40 TABLET, DELAYED RELEASE ORAL at 09:11

## 2018-11-04 RX ADMIN — METOPROLOL TARTRATE 50 MG: 50 TABLET ORAL at 09:11

## 2018-11-04 RX ADMIN — IPRATROPIUM BROMIDE AND ALBUTEROL SULFATE 3 ML: .5; 3 SOLUTION RESPIRATORY (INHALATION) at 07:11

## 2018-11-04 RX ADMIN — IPRATROPIUM BROMIDE AND ALBUTEROL SULFATE 3 ML: .5; 3 SOLUTION RESPIRATORY (INHALATION) at 03:11

## 2018-11-04 RX ADMIN — POLYETHYLENE GLYCOL 3350 17 G: 17 POWDER, FOR SOLUTION ORAL at 10:11

## 2018-11-04 RX ADMIN — IPRATROPIUM BROMIDE AND ALBUTEROL SULFATE 3 ML: .5; 3 SOLUTION RESPIRATORY (INHALATION) at 12:11

## 2018-11-04 NOTE — PLAN OF CARE
Problem: Patient Care Overview  Goal: Individualization & Mutuality  Outcome: Ongoing (interventions implemented as appropriate)  Pt. Resting quietly with eyes closed between care.  Assisted to bed after sitting uip in chair x3h during day.  States she is comfortable. No c/o pain at present.   at bedside early in shift.  Monitoring closely. Bed alarm maintained.

## 2018-11-04 NOTE — PLAN OF CARE
Problem: Fall Risk (Adult)  Goal: Identify Related Risk Factors and Signs and Symptoms  Related risk factors and signs and symptoms are identified upon initiation of Human Response Clinical Practice Guideline (CPG)  Outcome: Ongoing (interventions implemented as appropriate)  Pt remains free from fall or injury, calls for assistance with ambulating. Up in the chair most of the day. Updated pt and spouse on POC and procedure tomorrow. Denies pain or SOB. O2 sats >92% on 2-3L nasal canula. Afebrile and VS stable. De Anda catheter removed, due to void. Call light in reach. Will continue to monitor.

## 2018-11-04 NOTE — PLAN OF CARE
Problem: Patient Care Overview  Goal: Plan of Care Review  Outcome: Ongoing (interventions implemented as appropriate)  Nc 2 lpm in use with aerosol txs Q8 hrs with Aerobika tolerates well

## 2018-11-04 NOTE — PROGRESS NOTES
Progress Note  Hospital Medicine  Patient Name:Bertha Kelly  MRN:  [id number]  Patient Class: IP- Inpatient  Admit Date: 10/26/2018  Length of Stay: 8 days  Expected Discharge Date:   Attending Physician: Vicki Suarez MD  Primary Care Provider:  James Colon MD    SUBJECTIVE:     Principal Problem: Acute respiratory failure with hypoxia  Initial history of present illness: Bertha Kelly is a 67 y/o female with a PMHx of HTN and thyroid disease who presented to the ED today with c/o SOB which began 3 weeks ago and has progressively gotten worse.  Pt states that she becomes SOB with activity.  She reports that she received a pneumonia vaccine at her doctor's office about a month ago and began having some chest congestion and cough a few days later.  She is a current smoker of about 1/2 pack daily.  Reports increased fatigue and chills, but denies fever, chest pain, N/V, and dysuria.  Reports weight loss of a few pounds in the past 3 weeks due to poor appetite, but states that prior to this illness, weight was stable and she had a very good appetite.  Pt's CXR reveals a LLL pneumonia and was noted to be mildly hypoxic initially requiring supplemental oxygen.  She will be admitted to the service of hospital medicine for further treatment.    PMH/PSH/SH/FH/Meds: reviewed.    Symptoms/Review of Systems: Patient seen and examined. No acute events overnight.   Diet:  Adequate intake.    Activity level: Up with assistance  Pain:  Patient reports no pain.       OBJECTIVE:   Vital Signs (Most Recent):      Temp: 98.5 °F (36.9 °C) (11/04/18 0743)  Pulse: 73 (11/04/18 0743)  Resp: 18 (11/04/18 0743)  BP: (!) 118/56 (11/04/18 0743)  SpO2: (!) 93 % (11/04/18 0743)       Vital Signs Range (Last 24H):  Temp:  [98.1 °F (36.7 °C)-99.3 °F (37.4 °C)]   Pulse:  [72-83]   Resp:  [16-20]   BP: (111-133)/(56-80)   SpO2:  [90 %-98 %]     Weight: 57.3 kg (126 lb 5.2 oz)  Body mass index is 22.38 kg/m².    Intake/Output Summary (Last 24  hours) at 11/4/2018 1042  Last data filed at 11/4/2018 0616  Gross per 24 hour   Intake 180 ml   Output 850 ml   Net -670 ml     Physical Examination:  Constitutional: She is oriented to person, place, and time. She appears well-developed and well-nourished. No distress.   HENT:   Head: Normocephalic and atraumatic.   Eyes: Pupils are equal, round, and reactive to light.   Neck: Neck supple. No thyromegaly present.   Cardiovascular: Normal rate and regular rhythm. Exam reveals no gallop and no friction rub.   No murmur heard.  Pulmonary/Chest: No respiratory distress. She has wheezes.   Abdominal: Soft. Bowel sounds are normal. She exhibits no distension. There is no tenderness. There is no guarding.   Musculoskeletal: Normal range of motion. She exhibits no edema.   Neurological: She is alert and oriented to person, place, and time.   Skin: Skin is warm and dry. No erythema.   Psychiatric: She has a normal mood and affect.     CBC:  Recent Labs   Lab 11/02/18  0535 11/03/18  0453 11/04/18  0516   WBC 9.70 9.50 8.10   RBC 4.73 4.82 4.51   HGB 14.1 14.4 13.4   HCT 42.7 43.6 40.6    286 290   MCV 90 90 90   MCH 29.7 29.8 29.6   MCHC 32.9 33.0 33.0   BMP  Recent Labs   Lab 11/02/18  0535 11/03/18  0453 11/04/18  0516   GLU 98 109 105    136 136   K 3.8 3.6 3.9   CL 88* 87* 89*   CO2 35* 37* 39*   BUN 9 10 11   CREATININE 0.7 0.7 0.8   CALCIUM 9.7 10.0 9.5   MG 1.8 1.9 1.9      Diagnostic Results:  Microbiology Results (last 7 days)     Procedure Component Value Units Date/Time    Culture, Body Fluid (Aerobic) w/ GS [[id number]] Collected:  10/29/18 1815    Order Status:  Completed Specimen:  Body Fluid from Pleural Fluid Updated:  11/03/18 1039     AEROBIC CULTURE - FLUID No growth     Gram Stain Result Cytospin indicates:      Many WBC's      No organisms seen    Culture, Respiratory with Gram Stain [[id number]]  (Susceptibility) Collected:  10/29/18 140    Order Status:  Completed Specimen:  Respiratory  from Sputum, Expectorated Updated:  11/01/18 1313     Respiratory Culture --     CANDIDA ALBICANS  Moderate       Respiratory Culture --     STENOTROPHOMONAS (X.) MALTOPHILIA  Rare       Gram Stain (Respiratory) <10 epithelial cells per low power field.     Gram Stain (Respiratory) Few WBC's     Gram Stain (Respiratory) Moderate yeast     Gram Stain (Respiratory) Rare Gram positive rods    Blood culture x two cultures. Draw prior to antibiotics. [[id number]] Collected:  10/26/18 2348    Order Status:  Completed Specimen:  Blood from Peripheral, Left  Arm Updated:  11/01/18 1212     Blood Culture, Routine No growth after 5 days.    Narrative:       Aerobic and anaerobic    Blood culture x two cultures. Draw prior to antibiotics. [[id number]] Collected:  10/26/18 2348    Order Status:  Completed Specimen:  Blood from Peripheral, Right  Wrist Updated:  11/01/18 1212     Blood Culture, Routine No growth after 5 days.    Narrative:       Aerobic and anaerobic    Urine culture [[id number]] Collected:  10/27/18 2330    Order Status:  Completed Specimen:  Urine Updated:  10/29/18 1221     Urine Culture, Routine --     COAGULASE-NEGATIVE STAPHYLOCOCCUS SPECIES  10,000 - 49,999 cfu/ml  Susceptibility testing not routinely performed.  No other significant isolate      Narrative:       Preferred Collection Type->Urine, Clean Catch         CXR: The cardiomediastinal silhouette is normal in appearance.  No pulmonary vascular congestion appreciated. There is dense airspace consolidative change present at the left lung base and there is patchy segmental airspace opacity in the left mid and upper lung zones.  Differential considerations include pneumonia and aspiration.  Underlying pleural fluid cannot be excluded.  An underlying mass would be difficult to exclude.  Serial radiography will be necessary to ensure resolution and exclude an underlying lesion.  There is an age-indeterminate, possibly remote, right posterior 10th rib  fracture.    CXR:  Unchanged small left apical pneumothorax. Unchanged moderate left and small right pleural effusions and multifocal airspace disease.    CT chest:  Small left pneumothorax status post preceding left thoracentesis.  Less than 10%.  Moderate left and small right pleural effusions and moderate left lung and mild right lower lobe consolidation/atelectasis.  Enlarged prevascular mediastinal lymph node.  Additional nodular opacities at the cardiac apex which may represent additional prominent lymph nodes, or possibly pleural based nodules.  Moderate emphysema.  A distinct lung mass is not visible however follow-up to resolution of the pulmonary consolidation is recommended to evaluate for on the underlying lung mass, considering the enlarged mediastinal lymph node.  Further evaluation with CT chest with contrast may also be useful if the patient can receive IV contrast. 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    CXR: Unchanged small left apical pneumothorax. Unchanged moderate left and small right pleural effusions and multifocal airspace disease.    CXR: Continued dense infiltrate in the left mid and lower lung field with atelectasis and moderate to large left pleural effusion which obscures the heart size.  Small infiltrate at the right lung base and small right pleural effusion as well.    ECHO:  · Left ventricle ejection fraction is at 60%  · No wall motion abnormalities.  · Grade I (mild) left ventricular diastolic dysfunction consistent with impaired relaxation.  · LA pressure is normal.  · Mitral valve is mildly sclerotic  · There is a moderate left pleural effusion.  · Technically very difficult study    Assessment/Plan:     * Acute respiratory failure with hypoxia  Large left para-pneumonic effusion s/p thoracentesis     Presented with  increased SOB x 3 weeks.  Oxygen saturation noted at 89%, recovered well with supplemental oxygen via NC.  Likely due to pneumonia of LLL.  Pt is a daily smoker.  Likely has COPD, although she is not currently treated with chronic meds. Sputum growing Stenotrophomonas sp.  Continue supplemental oxygen as needed.  Nebulizer treatments   Monitor and treat as clinically indicated.  Follow Dr. Ravi and Dr. Abdi's recommendations.   To OR Monday  Thoracentesis with confirmation of Lung Adeno  NPO after midnight and hold anticoagulation      Tobacco abuse     Smoking cessation counseling performed. Dangers of cigarette smoking were reviewed with patient in detail and patient was encouraged to quit.      Generalized anxiety disorder     Chronic; currently controlled.  Continue home regimen.      Essential hypertension     Chronic; stable.  Continue chronic meds.      Hypothyroidism due to Hashimoto's thyroiditis     Chronic; pt states controlled.  Continue levothyroxine.      Pneumonia of left lower lobe due to infectious organism     Presents with SOB; LLL pneumonia identified on CXR.    Initiate treatment regimen for CAP, which includes a beta lactam and macrolide.   IV Rocephin and IV azithromycin.  Nebulizer treatments     Discussed with patient's .   VTE Risk Mitigation (From admission, onward)        Ordered     enoxaparin injection 40 mg  Daily      10/29/18 1231     IP VTE HIGH RISK PATIENT  Once      10/27/18 0207     Place EVELYNE hose  Until discontinued      10/27/18 0207     Place sequential compression device  Until discontinued      10/27/18 0207        Keith Sagastume MD  Department of Hospital Medicine   [facility]

## 2018-11-04 NOTE — PLAN OF CARE
11/04/18 0016   Patient Assessment/Suction   Level of Consciousness (AVPU) alert   Respiratory Effort Unlabored;Normal   Expansion/Accessory Muscles/Retractions no retractions;no use of accessory muscles   All Lung Fields Breath Sounds clear;diminished   PRE-TX-O2-ETCO2   O2 Device (Oxygen Therapy) nasal cannula   $ Is the patient on Low Flow Oxygen? Yes   Flow (L/min) 2   Oxygen Concentration (%) 28   SpO2 (!) 94 %   Pulse Oximetry Type Intermittent   $ Pulse Oximetry - Multiple Charge Pulse Oximetry - Multiple   Pulse 78   Resp 16   Aerosol Therapy   $ Aerosol Therapy Charges Aerosol Treatment   Respiratory Treatment Status given   SVN/Inhaler Treatment Route mask   Position During Treatment HOB at 30 degrees   Patient Tolerance good   Post-Treatment   Post-treatment Heart Rate (beats/min) 79   Post-treatment Resp Rate (breaths/min) 16   All Fields Breath Sounds unchanged   Ready to Wean/Extubation Screen   FIO2<=50 (chart decimal) 0.28

## 2018-11-05 ENCOUNTER — ANESTHESIA EVENT (OUTPATIENT)
Dept: SURGERY | Facility: HOSPITAL | Age: 68
DRG: 166 | End: 2018-11-05
Payer: COMMERCIAL

## 2018-11-05 ENCOUNTER — ANESTHESIA (OUTPATIENT)
Dept: SURGERY | Facility: HOSPITAL | Age: 68
DRG: 166 | End: 2018-11-05
Payer: COMMERCIAL

## 2018-11-05 PROBLEM — E44.0 MODERATE MALNUTRITION: Status: ACTIVE | Noted: 2018-11-05

## 2018-11-05 LAB
ALBUMIN SERPL BCP-MCNC: 2.5 G/DL
ALP SERPL-CCNC: 185 U/L
ALT SERPL W/O P-5'-P-CCNC: 176 U/L
ANION GAP SERPL CALC-SCNC: 11 MMOL/L
APTT BLDCRRT: 28.5 SEC
AST SERPL-CCNC: 176 U/L
BASOPHILS # BLD AUTO: 0 K/UL
BASOPHILS NFR BLD: 0.1 %
BILIRUB SERPL-MCNC: 0.3 MG/DL
BUN SERPL-MCNC: 11 MG/DL
CALCIUM SERPL-MCNC: 9.5 MG/DL
CHLORIDE SERPL-SCNC: 91 MMOL/L
CO2 SERPL-SCNC: 34 MMOL/L
CREAT SERPL-MCNC: 0.8 MG/DL
DIFFERENTIAL METHOD: ABNORMAL
EOSINOPHIL # BLD AUTO: 0.2 K/UL
EOSINOPHIL NFR BLD: 2.5 %
ERYTHROCYTE [DISTWIDTH] IN BLOOD BY AUTOMATED COUNT: 13.4 %
EST. GFR  (AFRICAN AMERICAN): >60 ML/MIN/1.73 M^2
EST. GFR  (NON AFRICAN AMERICAN): >60 ML/MIN/1.73 M^2
GLUCOSE SERPL-MCNC: 118 MG/DL
HCT VFR BLD AUTO: 40 %
HGB BLD-MCNC: 13.4 G/DL
INR PPP: 1
LYMPHOCYTES # BLD AUTO: 1.1 K/UL
LYMPHOCYTES NFR BLD: 12.4 %
MAGNESIUM SERPL-MCNC: 2 MG/DL
MCH RBC QN AUTO: 30 PG
MCHC RBC AUTO-ENTMCNC: 33.4 G/DL
MCV RBC AUTO: 90 FL
MONOCYTES # BLD AUTO: 0.8 K/UL
MONOCYTES NFR BLD: 8.9 %
NEUTROPHILS # BLD AUTO: 6.6 K/UL
NEUTROPHILS NFR BLD: 76.1 %
PHOSPHATE SERPL-MCNC: 3.7 MG/DL
PLATELET # BLD AUTO: 303 K/UL
PMV BLD AUTO: 8.4 FL
POTASSIUM SERPL-SCNC: 3.8 MMOL/L
PROT SERPL-MCNC: 6.3 G/DL
PROTHROMBIN TIME: 9.9 SEC
RBC # BLD AUTO: 4.46 M/UL
SODIUM SERPL-SCNC: 136 MMOL/L
WBC # BLD AUTO: 8.6 K/UL

## 2018-11-05 PROCEDURE — 25000003 PHARM REV CODE 250: Performed by: THORACIC SURGERY (CARDIOTHORACIC VASCULAR SURGERY)

## 2018-11-05 PROCEDURE — 63600175 PHARM REV CODE 636 W HCPCS: Performed by: NURSE ANESTHETIST, CERTIFIED REGISTERED

## 2018-11-05 PROCEDURE — 25000003 PHARM REV CODE 250: Performed by: INTERNAL MEDICINE

## 2018-11-05 PROCEDURE — 36000710: Performed by: THORACIC SURGERY (CARDIOTHORACIC VASCULAR SURGERY)

## 2018-11-05 PROCEDURE — 94761 N-INVAS EAR/PLS OXIMETRY MLT: CPT

## 2018-11-05 PROCEDURE — 88305 TISSUE EXAM BY PATHOLOGIST: CPT | Performed by: PATHOLOGY

## 2018-11-05 PROCEDURE — 83735 ASSAY OF MAGNESIUM: CPT

## 2018-11-05 PROCEDURE — 36000711: Performed by: THORACIC SURGERY (CARDIOTHORACIC VASCULAR SURGERY)

## 2018-11-05 PROCEDURE — 99900103 DSU ONLY-NO CHARGE-INITIAL HR (STAT): Performed by: THORACIC SURGERY (CARDIOTHORACIC VASCULAR SURGERY)

## 2018-11-05 PROCEDURE — 88305 TISSUE EXAM BY PATHOLOGIST: CPT | Mod: 26,,, | Performed by: PATHOLOGY

## 2018-11-05 PROCEDURE — 88342 IMHCHEM/IMCYTCHM 1ST ANTB: CPT | Mod: 26,,, | Performed by: PATHOLOGY

## 2018-11-05 PROCEDURE — 27200695 HC TUBE, ENDOBRONCHIAL: Performed by: NURSE ANESTHETIST, CERTIFIED REGISTERED

## 2018-11-05 PROCEDURE — 99900104 DSU ONLY-NO CHARGE-EA ADD'L HR (STAT): Performed by: THORACIC SURGERY (CARDIOTHORACIC VASCULAR SURGERY)

## 2018-11-05 PROCEDURE — 25000003 PHARM REV CODE 250: Performed by: NURSE ANESTHETIST, CERTIFIED REGISTERED

## 2018-11-05 PROCEDURE — 99900035 HC TECH TIME PER 15 MIN (STAT)

## 2018-11-05 PROCEDURE — 63600175 PHARM REV CODE 636 W HCPCS: Performed by: INTERNAL MEDICINE

## 2018-11-05 PROCEDURE — 0BBN4ZX EXCISION OF RIGHT PLEURA, PERCUTANEOUS ENDOSCOPIC APPROACH, DIAGNOSTIC: ICD-10-PCS | Performed by: THORACIC SURGERY (CARDIOTHORACIC VASCULAR SURGERY)

## 2018-11-05 PROCEDURE — 94640 AIRWAY INHALATION TREATMENT: CPT

## 2018-11-05 PROCEDURE — 88341 IMHCHEM/IMCYTCHM EA ADD ANTB: CPT | Mod: 26,,, | Performed by: PATHOLOGY

## 2018-11-05 PROCEDURE — 88307 TISSUE EXAM BY PATHOLOGIST: CPT | Mod: 26,,, | Performed by: PATHOLOGY

## 2018-11-05 PROCEDURE — 71000033 HC RECOVERY, INTIAL HOUR: Performed by: THORACIC SURGERY (CARDIOTHORACIC VASCULAR SURGERY)

## 2018-11-05 PROCEDURE — D9220A PRA ANESTHESIA: Mod: ANES,,, | Performed by: ANESTHESIOLOGY

## 2018-11-05 PROCEDURE — 27000221 HC OXYGEN, UP TO 24 HOURS

## 2018-11-05 PROCEDURE — 99232 SBSQ HOSP IP/OBS MODERATE 35: CPT | Mod: ,,, | Performed by: INTERNAL MEDICINE

## 2018-11-05 PROCEDURE — 85025 COMPLETE CBC W/AUTO DIFF WBC: CPT

## 2018-11-05 PROCEDURE — 25000003 PHARM REV CODE 250: Performed by: ANESTHESIOLOGY

## 2018-11-05 PROCEDURE — D9220A PRA ANESTHESIA: Mod: CRNA,,, | Performed by: NURSE ANESTHETIST, CERTIFIED REGISTERED

## 2018-11-05 PROCEDURE — 20000000 HC ICU ROOM

## 2018-11-05 PROCEDURE — 94664 DEMO&/EVAL PT USE INHALER: CPT

## 2018-11-05 PROCEDURE — C1729 CATH, DRAINAGE: HCPCS | Performed by: THORACIC SURGERY (CARDIOTHORACIC VASCULAR SURGERY)

## 2018-11-05 PROCEDURE — 80053 COMPREHEN METABOLIC PANEL: CPT

## 2018-11-05 PROCEDURE — 3E0L4GC INTRODUCTION OF OTHER THERAPEUTIC SUBSTANCE INTO PLEURAL CAVITY, PERCUTANEOUS ENDOSCOPIC APPROACH: ICD-10-PCS | Performed by: THORACIC SURGERY (CARDIOTHORACIC VASCULAR SURGERY)

## 2018-11-05 PROCEDURE — 25000242 PHARM REV CODE 250 ALT 637 W/ HCPCS: Performed by: INTERNAL MEDICINE

## 2018-11-05 PROCEDURE — 0BBP4ZX EXCISION OF LEFT PLEURA, PERCUTANEOUS ENDOSCOPIC APPROACH, DIAGNOSTIC: ICD-10-PCS | Performed by: THORACIC SURGERY (CARDIOTHORACIC VASCULAR SURGERY)

## 2018-11-05 PROCEDURE — 37000008 HC ANESTHESIA 1ST 15 MINUTES: Performed by: THORACIC SURGERY (CARDIOTHORACIC VASCULAR SURGERY)

## 2018-11-05 PROCEDURE — 27201423 OPTIME MED/SURG SUP & DEVICES STERILE SUPPLY: Performed by: THORACIC SURGERY (CARDIOTHORACIC VASCULAR SURGERY)

## 2018-11-05 PROCEDURE — 85730 THROMBOPLASTIN TIME PARTIAL: CPT

## 2018-11-05 PROCEDURE — 84100 ASSAY OF PHOSPHORUS: CPT

## 2018-11-05 PROCEDURE — S0020 INJECTION, BUPIVICAINE HYDRO: HCPCS | Performed by: THORACIC SURGERY (CARDIOTHORACIC VASCULAR SURGERY)

## 2018-11-05 PROCEDURE — 0BBJ4ZX EXCISION OF LEFT LOWER LUNG LOBE, PERCUTANEOUS ENDOSCOPIC APPROACH, DIAGNOSTIC: ICD-10-PCS | Performed by: THORACIC SURGERY (CARDIOTHORACIC VASCULAR SURGERY)

## 2018-11-05 PROCEDURE — 97802 MEDICAL NUTRITION INDIV IN: CPT

## 2018-11-05 PROCEDURE — 85610 PROTHROMBIN TIME: CPT

## 2018-11-05 PROCEDURE — 37000009 HC ANESTHESIA EA ADD 15 MINS: Performed by: THORACIC SURGERY (CARDIOTHORACIC VASCULAR SURGERY)

## 2018-11-05 RX ORDER — DEXAMETHASONE SODIUM PHOSPHATE 4 MG/ML
INJECTION, SOLUTION INTRA-ARTICULAR; INTRALESIONAL; INTRAMUSCULAR; INTRAVENOUS; SOFT TISSUE
Status: DISCONTINUED | OUTPATIENT
Start: 2018-11-05 | End: 2018-11-05

## 2018-11-05 RX ORDER — SODIUM CHLORIDE, SODIUM LACTATE, POTASSIUM CHLORIDE, CALCIUM CHLORIDE 600; 310; 30; 20 MG/100ML; MG/100ML; MG/100ML; MG/100ML
INJECTION, SOLUTION INTRAVENOUS CONTINUOUS PRN
Status: DISCONTINUED | OUTPATIENT
Start: 2018-11-05 | End: 2018-11-05

## 2018-11-05 RX ORDER — TALC 5 G/100ML
POWDER INTRAPLEURAL
Status: DISCONTINUED | OUTPATIENT
Start: 2018-11-05 | End: 2018-11-05 | Stop reason: HOSPADM

## 2018-11-05 RX ORDER — SODIUM CHLORIDE 0.9 % (FLUSH) 0.9 %
3 SYRINGE (ML) INJECTION EVERY 8 HOURS
Status: DISCONTINUED | OUTPATIENT
Start: 2018-11-05 | End: 2018-11-05

## 2018-11-05 RX ORDER — MEPERIDINE HYDROCHLORIDE 50 MG/ML
12.5 INJECTION INTRAMUSCULAR; INTRAVENOUS; SUBCUTANEOUS ONCE AS NEEDED
Status: DISCONTINUED | OUTPATIENT
Start: 2018-11-05 | End: 2018-11-05

## 2018-11-05 RX ORDER — FENTANYL CITRATE 50 UG/ML
INJECTION, SOLUTION INTRAMUSCULAR; INTRAVENOUS
Status: DISCONTINUED | OUTPATIENT
Start: 2018-11-05 | End: 2018-11-05

## 2018-11-05 RX ORDER — FENTANYL CITRATE 50 UG/ML
25 INJECTION, SOLUTION INTRAMUSCULAR; INTRAVENOUS EVERY 5 MIN PRN
Status: DISCONTINUED | OUTPATIENT
Start: 2018-11-05 | End: 2018-11-05

## 2018-11-05 RX ORDER — SODIUM CHLORIDE, SODIUM LACTATE, POTASSIUM CHLORIDE, CALCIUM CHLORIDE 600; 310; 30; 20 MG/100ML; MG/100ML; MG/100ML; MG/100ML
INJECTION, SOLUTION INTRAVENOUS CONTINUOUS
Status: DISCONTINUED | OUTPATIENT
Start: 2018-11-05 | End: 2018-11-13

## 2018-11-05 RX ORDER — MIDAZOLAM HYDROCHLORIDE 1 MG/ML
INJECTION, SOLUTION INTRAMUSCULAR; INTRAVENOUS
Status: DISCONTINUED | OUTPATIENT
Start: 2018-11-05 | End: 2018-11-05

## 2018-11-05 RX ORDER — EPHEDRINE SULFATE 50 MG/ML
INJECTION, SOLUTION INTRAVENOUS
Status: DISCONTINUED | OUTPATIENT
Start: 2018-11-05 | End: 2018-11-05

## 2018-11-05 RX ORDER — ONDANSETRON 2 MG/ML
4 INJECTION INTRAMUSCULAR; INTRAVENOUS DAILY PRN
Status: DISCONTINUED | OUTPATIENT
Start: 2018-11-05 | End: 2018-11-05

## 2018-11-05 RX ORDER — GLYCOPYRROLATE 0.2 MG/ML
INJECTION INTRAMUSCULAR; INTRAVENOUS
Status: DISCONTINUED | OUTPATIENT
Start: 2018-11-05 | End: 2018-11-05

## 2018-11-05 RX ORDER — ROCURONIUM BROMIDE 10 MG/ML
INJECTION, SOLUTION INTRAVENOUS
Status: DISCONTINUED | OUTPATIENT
Start: 2018-11-05 | End: 2018-11-05

## 2018-11-05 RX ORDER — SUCCINYLCHOLINE CHLORIDE 20 MG/ML
INJECTION INTRAMUSCULAR; INTRAVENOUS
Status: DISCONTINUED | OUTPATIENT
Start: 2018-11-05 | End: 2018-11-05

## 2018-11-05 RX ORDER — SODIUM CHLORIDE 0.9 % (FLUSH) 0.9 %
3 SYRINGE (ML) INJECTION
Status: DISCONTINUED | OUTPATIENT
Start: 2018-11-05 | End: 2018-11-05

## 2018-11-05 RX ORDER — LIDOCAINE HCL/PF 100 MG/5ML
SYRINGE (ML) INTRAVENOUS
Status: DISCONTINUED | OUTPATIENT
Start: 2018-11-05 | End: 2018-11-05

## 2018-11-05 RX ORDER — ONDANSETRON 2 MG/ML
INJECTION INTRAMUSCULAR; INTRAVENOUS
Status: DISCONTINUED | OUTPATIENT
Start: 2018-11-05 | End: 2018-11-05

## 2018-11-05 RX ORDER — KETOROLAC TROMETHAMINE 30 MG/ML
INJECTION, SOLUTION INTRAMUSCULAR; INTRAVENOUS
Status: DISCONTINUED | OUTPATIENT
Start: 2018-11-05 | End: 2018-11-05

## 2018-11-05 RX ORDER — NEOSTIGMINE METHYLSULFATE 1 MG/ML
INJECTION, SOLUTION INTRAVENOUS
Status: DISCONTINUED | OUTPATIENT
Start: 2018-11-05 | End: 2018-11-05

## 2018-11-05 RX ORDER — OXYCODONE HYDROCHLORIDE 5 MG/1
5 TABLET ORAL
Status: DISCONTINUED | OUTPATIENT
Start: 2018-11-05 | End: 2018-11-05

## 2018-11-05 RX ORDER — OXYCODONE AND ACETAMINOPHEN 7.5; 325 MG/1; MG/1
1 TABLET ORAL EVERY 6 HOURS PRN
Status: DISCONTINUED | OUTPATIENT
Start: 2018-11-05 | End: 2018-11-14 | Stop reason: HOSPADM

## 2018-11-05 RX ORDER — HYDROMORPHONE HYDROCHLORIDE 2 MG/ML
0.2 INJECTION, SOLUTION INTRAMUSCULAR; INTRAVENOUS; SUBCUTANEOUS EVERY 5 MIN PRN
Status: DISCONTINUED | OUTPATIENT
Start: 2018-11-05 | End: 2018-11-05

## 2018-11-05 RX ORDER — PROPOFOL 10 MG/ML
VIAL (ML) INTRAVENOUS
Status: DISCONTINUED | OUTPATIENT
Start: 2018-11-05 | End: 2018-11-05

## 2018-11-05 RX ORDER — BUPIVACAINE HYDROCHLORIDE 5 MG/ML
INJECTION, SOLUTION EPIDURAL; INTRACAUDAL
Status: DISCONTINUED | OUTPATIENT
Start: 2018-11-05 | End: 2018-11-05 | Stop reason: HOSPADM

## 2018-11-05 RX ADMIN — PROPOFOL 100 MG: 10 INJECTION, EMULSION INTRAVENOUS at 09:11

## 2018-11-05 RX ADMIN — SODIUM CHLORIDE, SODIUM LACTATE, POTASSIUM CHLORIDE, AND CALCIUM CHLORIDE: .6; .31; .03; .02 INJECTION, SOLUTION INTRAVENOUS at 11:11

## 2018-11-05 RX ADMIN — NEOSTIGMINE METHYLSULFATE 3 MG: 1 INJECTION INTRAVENOUS at 11:11

## 2018-11-05 RX ADMIN — CEFTRIAXONE 1 G: 1 INJECTION, SOLUTION INTRAVENOUS at 07:11

## 2018-11-05 RX ADMIN — IPRATROPIUM BROMIDE AND ALBUTEROL SULFATE 3 ML: .5; 3 SOLUTION RESPIRATORY (INHALATION) at 12:11

## 2018-11-05 RX ADMIN — SODIUM CHLORIDE, SODIUM LACTATE, POTASSIUM CHLORIDE, AND CALCIUM CHLORIDE: .6; .31; .03; .02 INJECTION, SOLUTION INTRAVENOUS at 12:11

## 2018-11-05 RX ADMIN — OXYCODONE HYDROCHLORIDE AND ACETAMINOPHEN 1 TABLET: 7.5; 325 TABLET ORAL at 09:11

## 2018-11-05 RX ADMIN — AMITRIPTYLINE HYDROCHLORIDE 25 MG: 25 TABLET, FILM COATED ORAL at 09:11

## 2018-11-05 RX ADMIN — SODIUM CHLORIDE, SODIUM LACTATE, POTASSIUM CHLORIDE, AND CALCIUM CHLORIDE: .6; .31; .03; .02 INJECTION, SOLUTION INTRAVENOUS at 09:11

## 2018-11-05 RX ADMIN — OXYCODONE HYDROCHLORIDE 5 MG: 5 TABLET ORAL at 12:11

## 2018-11-05 RX ADMIN — EPHEDRINE SULFATE 25 MG: 50 INJECTION, SOLUTION INTRAMUSCULAR; INTRAVENOUS; SUBCUTANEOUS at 10:11

## 2018-11-05 RX ADMIN — SUCCINYLCHOLINE CHLORIDE 120 MG: 20 INJECTION, SOLUTION INTRAMUSCULAR; INTRAVENOUS at 09:11

## 2018-11-05 RX ADMIN — IPRATROPIUM BROMIDE AND ALBUTEROL SULFATE 3 ML: .5; 3 SOLUTION RESPIRATORY (INHALATION) at 07:11

## 2018-11-05 RX ADMIN — METOPROLOL TARTRATE 50 MG: 50 TABLET ORAL at 09:11

## 2018-11-05 RX ADMIN — GLYCOPYRROLATE 0.4 MG: 0.2 INJECTION, SOLUTION INTRAMUSCULAR; INTRAVENOUS at 11:11

## 2018-11-05 RX ADMIN — DEXAMETHASONE SODIUM PHOSPHATE 8 MG: 4 INJECTION, SOLUTION INTRAMUSCULAR; INTRAVENOUS at 09:11

## 2018-11-05 RX ADMIN — POLYETHYLENE GLYCOL 3350 17 G: 17 POWDER, FOR SOLUTION ORAL at 09:11

## 2018-11-05 RX ADMIN — MIDAZOLAM 2 MG: 1 INJECTION INTRAMUSCULAR; INTRAVENOUS at 09:11

## 2018-11-05 RX ADMIN — ONDANSETRON 4 MG: 2 INJECTION, SOLUTION INTRAMUSCULAR; INTRAVENOUS at 09:11

## 2018-11-05 RX ADMIN — KETOROLAC TROMETHAMINE 30 MG: 30 INJECTION, SOLUTION INTRAMUSCULAR; INTRAVENOUS at 11:11

## 2018-11-05 RX ADMIN — ROCURONIUM BROMIDE 5 MG: 10 INJECTION, SOLUTION INTRAVENOUS at 09:11

## 2018-11-05 RX ADMIN — OXYCODONE HYDROCHLORIDE AND ACETAMINOPHEN 1 TABLET: 7.5; 325 TABLET ORAL at 02:11

## 2018-11-05 RX ADMIN — FENTANYL CITRATE 50 MCG: 50 INJECTION, SOLUTION INTRAMUSCULAR; INTRAVENOUS at 09:11

## 2018-11-05 RX ADMIN — IPRATROPIUM BROMIDE AND ALBUTEROL SULFATE 3 ML: .5; 3 SOLUTION RESPIRATORY (INHALATION) at 11:11

## 2018-11-05 RX ADMIN — LIDOCAINE HYDROCHLORIDE 50 MG: 20 INJECTION, SOLUTION INTRAVENOUS at 09:11

## 2018-11-05 RX ADMIN — ROCURONIUM BROMIDE 25 MG: 10 INJECTION, SOLUTION INTRAVENOUS at 10:11

## 2018-11-05 RX ADMIN — FENTANYL CITRATE 50 MCG: 50 INJECTION, SOLUTION INTRAMUSCULAR; INTRAVENOUS at 10:11

## 2018-11-05 NOTE — BRIEF OP NOTE
[facility]  Brief Operative Note    SUMMARY     Surgery Date: 11/5/2018     Surgeon(s) and Role:     * Mp Jones MD - Primary    Assisting Surgeon: None    Pre-op Diagnosis:  Pleural effusion [J90]    Post-op Diagnosis:  Post-Op Diagnosis Codes:     * Pleural effusion [J90]    Procedure(s) (LRB):  VATS, WITH PLEURA BIOPSY (Left)  BIOPSY, LUNG, THORACOSCOPIC (Left)  VATS, WITH PLEURODESIS (Left)    Anesthesia: General    Description of the findings of the procedure:   Implants on chest wall particularly lateral, lung and surface of diaphragm. Biopsy taken of pleura with implant as well as lower lobe  Talc then distributed    Estimated Blood Loss: 20 mL         Specimens:   Specimen (12h ago, onward)    None

## 2018-11-05 NOTE — PROGRESS NOTES
Progress Note  Hospital Medicine  Patient Name:Bertha Kelly  MRN:  [id number]  Patient Class: IP- Inpatient  Admit Date: 10/26/2018  Length of Stay: 9 days  Expected Discharge Date:   Attending Physician: Vicki Suarez MD  Primary Care Provider:  James Colon MD    SUBJECTIVE:     Principal Problem: Acute respiratory failure with hypoxia  Initial history of present illness: Bertha Klely is a 69 y/o female with a PMHx of HTN and thyroid disease who presented to the ED today with c/o SOB which began 3 weeks ago and has progressively gotten worse.  Pt states that she becomes SOB with activity.  She reports that she received a pneumonia vaccine at her doctor's office about a month ago and began having some chest congestion and cough a few days later.  She is a current smoker of about 1/2 pack daily.  Reports increased fatigue and chills, but denies fever, chest pain, N/V, and dysuria.  Reports weight loss of a few pounds in the past 3 weeks due to poor appetite, but states that prior to this illness, weight was stable and she had a very good appetite.  Pt's CXR reveals a LLL pneumonia and was noted to be mildly hypoxic initially requiring supplemental oxygen.  She will be admitted to the service of hospital medicine for further treatment.    PMH/PSH/SH/FH/Meds: reviewed.    Symptoms/Review of Systems: Patient seen and examined. In ICU s/p VATS, left pleural biopsy, lung biopsy and left pleurodesis. On 2 L/min via NC.   Diet:  NPO   Activity level: Up with assistance  Pain:  Patient reports no pain.       OBJECTIVE:   Vital Signs (Most Recent):      Temp: 98.3 °F (36.8 °C) (11/05/18 0342)  Pulse: 72 (11/05/18 0723)  Resp: 20 (11/05/18 0723)  BP: (!) 107/57 (11/05/18 0342)  SpO2: (!) 92 % (11/05/18 0723)       Vital Signs Range (Last 24H):  Temp:  [97.2 °F (36.2 °C)-98.6 °F (37 °C)]   Pulse:  [67-82]   Resp:  [16-20]   BP: (107-125)/(56-60)   SpO2:  [90 %-95 %]     Weight: 58.3 kg (128 lb 8 oz)  Body mass index is  22.76 kg/m².    Intake/Output Summary (Last 24 hours) at 11/5/2018 0831  Last data filed at 11/5/2018 0600  Gross per 24 hour   Intake 900 ml   Output 800 ml   Net 100 ml     Physical Examination:  Constitutional: She is oriented to person, place, and time. She appears well-developed and well-nourished. No distress.   HENT:   Head: Normocephalic and atraumatic.   Eyes: Pupils are equal, round, and reactive to light.   Neck: Neck supple. No thyromegaly present.   Cardiovascular: Normal rate and regular rhythm. Exam reveals no gallop and no friction rub.   No murmur heard.  Pulmonary/Chest: No respiratory distress. She has wheezes. Left chest tube is in place  Abdominal: Soft. Bowel sounds are normal. She exhibits no distension. There is no tenderness. There is no guarding.   Musculoskeletal: Normal range of motion. She exhibits no edema.   Neurological: She is alert and oriented to person, place, and time.   Skin: Skin is warm and dry. No erythema.   Psychiatric: She has a normal mood and affect.     CBC:  Recent Labs   Lab 11/03/18 0453 11/04/18 0516 11/05/18 0415   WBC 9.50 8.10 8.60   RBC 4.82 4.51 4.46   HGB 14.4 13.4 13.4   HCT 43.6 40.6 40.0    290 303   MCV 90 90 90   MCH 29.8 29.6 30.0   MCHC 33.0 33.0 33.4   BMP  Recent Labs   Lab 11/03/18 0453 11/04/18 0516 11/05/18  0415    105 118*    136 136   K 3.6 3.9 3.8   CL 87* 89* 91*   CO2 37* 39* 34*   BUN 10 11 11   CREATININE 0.7 0.8 0.8   CALCIUM 10.0 9.5 9.5   MG 1.9 1.9 2.0      Diagnostic Results:  Microbiology Results (last 7 days)     Procedure Component Value Units Date/Time    Culture, Body Fluid (Aerobic) w/ GS [[id number]] Collected:  10/29/18 1815    Order Status:  Completed Specimen:  Body Fluid from Pleural Fluid Updated:  11/03/18 1039     AEROBIC CULTURE - FLUID No growth     Gram Stain Result Cytospin indicates:      Many WBC's      No organisms seen    Culture, Respiratory with Gram Stain [[id number]]  (Susceptibility)  Collected:  10/29/18 1401    Order Status:  Completed Specimen:  Respiratory from Sputum, Expectorated Updated:  11/01/18 1313     Respiratory Culture --     CANDIDA ALBICANS  Moderate       Respiratory Culture --     STENOTROPHOMONAS (X.) MALTOPHILIA  Rare       Gram Stain (Respiratory) <10 epithelial cells per low power field.     Gram Stain (Respiratory) Few WBC's     Gram Stain (Respiratory) Moderate yeast     Gram Stain (Respiratory) Rare Gram positive rods    Blood culture x two cultures. Draw prior to antibiotics. [[id number]] Collected:  10/26/18 2348    Order Status:  Completed Specimen:  Blood from Peripheral, Left  Arm Updated:  11/01/18 1212     Blood Culture, Routine No growth after 5 days.    Narrative:       Aerobic and anaerobic    Blood culture x two cultures. Draw prior to antibiotics. [[id number]] Collected:  10/26/18 2348    Order Status:  Completed Specimen:  Blood from Peripheral, Right  Wrist Updated:  11/01/18 1212     Blood Culture, Routine No growth after 5 days.    Narrative:       Aerobic and anaerobic    Urine culture [[id number]] Collected:  10/27/18 2330    Order Status:  Completed Specimen:  Urine Updated:  10/29/18 1221     Urine Culture, Routine --     COAGULASE-NEGATIVE STAPHYLOCOCCUS SPECIES  10,000 - 49,999 cfu/ml  Susceptibility testing not routinely performed.  No other significant isolate      Narrative:       Preferred Collection Type->Urine, Clean Catch         CXR: The cardiomediastinal silhouette is normal in appearance.  No pulmonary vascular congestion appreciated. There is dense airspace consolidative change present at the left lung base and there is patchy segmental airspace opacity in the left mid and upper lung zones.  Differential considerations include pneumonia and aspiration.  Underlying pleural fluid cannot be excluded.  An underlying mass would be difficult to exclude.  Serial radiography will be necessary to ensure resolution and exclude an underlying lesion.   There is an age-indeterminate, possibly remote, right posterior 10th rib fracture.    CXR:  Unchanged small left apical pneumothorax. Unchanged moderate left and small right pleural effusions and multifocal airspace disease.    CT chest:  Small left pneumothorax status post preceding left thoracentesis.  Less than 10%.  Moderate left and small right pleural effusions and moderate left lung and mild right lower lobe consolidation/atelectasis.  Enlarged prevascular mediastinal lymph node.  Additional nodular opacities at the cardiac apex which may represent additional prominent lymph nodes, or possibly pleural based nodules.  Moderate emphysema.  A distinct lung mass is not visible however follow-up to resolution of the pulmonary consolidation is recommended to evaluate for on the underlying lung mass, considering the enlarged mediastinal lymph node.  Further evaluation with CT chest with contrast may also be useful if the patient can receive IV contrast. 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    CXR: Unchanged small left apical pneumothorax. Unchanged moderate left and small right pleural effusions and multifocal airspace disease.    CXR: Continued dense infiltrate in the left mid and lower lung field with atelectasis and moderate to large left pleural effusion which obscures the heart size.  Small infiltrate at the right lung base and small right pleural effusion as well.    ECHO:  · Left ventricle ejection fraction is at 60%  · No wall motion abnormalities.  · Grade I (mild) left ventricular diastolic dysfunction consistent with impaired relaxation.  · LA pressure is normal.  · Mitral valve is mildly sclerotic  · There is a moderate left pleural effusion.  · Technically very difficult study    Assessment/Plan:     * Acute respiratory failure with  hypoxia  Large left para-pneumonic effusion s/p thoracentesis  Lung Adenocarcinoma s/p VATS, left pleural biopsy, lung biopsy and left pleurodesis     Close monitoring in ICU. Follow pulmonary and CTS recommendations. Case discussed with Dr. Jones.  Continue supplemental oxygen as needed.  Nebulizer treatments   Monitor and treat as clinically indicated.      Tobacco abuse     Smoking cessation counseling performed. Dangers of cigarette smoking were reviewed with patient in detail and patient was encouraged to quit.      Generalized anxiety disorder     Chronic; currently controlled.  Continue home regimen.      Essential hypertension     Chronic; stable.  Continue chronic meds.      Hypothyroidism due to Hashimoto's thyroiditis     Chronic; pt states controlled.  Continue levothyroxine.      Pneumonia of left lower lobe due to infectious organism     Presents with SOB; LLL pneumonia identified on CXR.    Initiate treatment regimen for CAP, which includes a beta lactam and macrolide.   IV Rocephin and IV azithromycin.  Nebulizer treatments     Discussed with patient's .   VTE Risk Mitigation (From admission, onward)        Ordered     IP VTE HIGH RISK PATIENT  Once . Start Lovenox 40 mg SQ q day once okay with Dr. Jones.    10/27/18 0207     Place EVELYNE hose  Until discontinued      10/27/18 0207     Place sequential compression device  Until discontinued      10/27/18 0207        Vicki Suarez MD  Department of Hospital Medicine   [facility]

## 2018-11-05 NOTE — PLAN OF CARE
11/05/18 0020   Patient Assessment/Suction   Level of Consciousness (AVPU) alert   Respiratory Effort Unlabored   Expansion/Accessory Muscles/Retractions no retractions;no use of accessory muscles   All Lung Fields Breath Sounds clear   Rhythm/Pattern, Respiratory unlabored   Cough Frequency infrequent   Cough Type loose   PRE-TX-O2-ETCO2   O2 Device (Oxygen Therapy) nasal cannula   Flow (L/min) 3   Oxygen Concentration (%) 32   SpO2 (!) 94 %   Pulse Oximetry Type Intermittent   Pulse 67   Resp 16   Aerosol Therapy   $ Aerosol Therapy Charges Aerosol Treatment   Respiratory Treatment Status given   SVN/Inhaler Treatment Route mask   Position During Treatment HOB at 45 degrees   Patient Tolerance good   Post-Treatment   Post-treatment Heart Rate (beats/min) 72   Post-treatment Resp Rate (breaths/min) 16   All Fields Breath Sounds aeration increased   Ready to Wean/Extubation Screen   FIO2<=50 (chart decimal) 0.32

## 2018-11-05 NOTE — ANESTHESIA PREPROCEDURE EVALUATION
11/05/2018  Bertha Kelly is a 68 y.o., female.    Anesthesia Evaluation    I have reviewed the Patient Summary Reports.    I have reviewed the Nursing Notes.   I have reviewed the Medications.     Review of Systems  Anesthesia Hx:  No problems with previous Anesthesia    Social:  Smoker    Cardiovascular:   Hypertension, well controlled    Pulmonary:   Pneumonia Shortness of breath Acute Resp Failure with hypoxia   Pt on 3L NC and not labored or tachypneic.   Renal/:  Renal/ Normal     Neurological:  Neurology Normal Pt is disoriented but appropriate.  Not AAO to time or events  AAO to person and place   Endocrine:   Hypothyroidism    Psych:   Psychiatric History (anorexia) anxiety          Physical Exam  General:  Well nourished    Airway/Jaw/Neck:  Airway Findings: Mouth Opening: Normal Tongue: Normal  General Airway Assessment: Adult  Oropharynx Findings:  Mallampati: II  Jaw/Neck Findings:  Neck ROM: Normal ROM     Eyes/Ears/Nose:  Eyes/Ears/Nose Findings:    Dental:  Dental Findings:   Chest/Lungs:  Chest/Lungs Findings: Normal Respiratory Rate     Heart/Vascular:  Heart Findings: Rate: Normal  Rhythm: Regular Rhythm        Mental Status:  Mental Status Findings:  Cooperative, Alert and Oriented         Anesthesia Plan  Type of Anesthesia, risks & benefits discussed:  Anesthesia Type:  general  Patient's Preference:   Intra-op Monitoring Plan: standard ASA monitors  Intra-op Monitoring Plan Comments:   Post Op Pain Control Plan: multimodal analgesia  Post Op Pain Control Plan Comments:   Induction:   IV  Beta Blocker:  Patient is on a Beta-Blocker and has received one dose within the past 24 hours (No further documentation required).       Informed Consent: Patient representative understands risks and agrees with Anesthesia plan.  Questions answered. Anesthesia consent signed with patient  representative.  ASA Score: 4     Day of Surgery Review of History & Physical:  There are no significant changes.   H&P completed by Anesthesiologist.   Anesthesia Plan Notes: H&P & Consent with Pt and  due to her disorientation.        Ready For Surgery From Anesthesia Perspective.

## 2018-11-05 NOTE — TRANSFER OF CARE
"Anesthesia Transfer of Care Note    Patient: Betrha Kelly    Procedure(s) Performed: Procedure(s) (LRB):  VATS, WITH PLEURA BIOPSY (Left)  BIOPSY, LUNG, THORACOSCOPIC (Left)  VATS, WITH PLEURODESIS (Left)    Patient location: ICU    Anesthesia Type: general    Transport from OR: Transported from OR on 2-3 L/min O2 by NC with adequate spontaneous ventilation    Post pain: adequate analgesia    Post assessment: no apparent anesthetic complications and tolerated procedure well    Post vital signs: stable    Level of consciousness: awake, alert and oriented    Nausea/Vomiting: no nausea/vomiting    Complications: none    Transfer of care protocol was followed      Last vitals:   Visit Vitals  BP (!) 124/58 (BP Location: Left arm, Patient Position: Sitting)   Pulse 84   Temp 36.7 °C (98.1 °F) (Skin)   Resp 20   Ht 5' 3" (1.6 m)   Wt 58.1 kg (128 lb)   LMP  (LMP Unknown)   SpO2 95%   Breastfeeding? No   BMI 22.67 kg/m²     "

## 2018-11-05 NOTE — PLAN OF CARE
Problem: Nutrition, Imbalanced: Inadequate Oral Intake (Adult)  Goal: Improved Oral Intake  Patient will demonstrate the desired outcomes by discharge/transition of care.  Recommendation: 1) Advance to cardiac diet per pt tolerance 2) Add Boost Plus, tid when diet advanced  3) request actual weight    Intervention:   Moderate malnutrition identified per ASPEN guidelines.  Goals: 1) Pt will consume >=50% EEN by f/u   Nutrition Goal Status: new  Communication of RD Recs: (POC, sticky note)

## 2018-11-05 NOTE — PLAN OF CARE
Problem: Patient Care Overview  Goal: Plan of Care Review  Outcome: Ongoing (interventions implemented as appropriate)  Patient AAOx3, disoriented to time. VSS and pt afebrile. 02 PRN @ 2-3L, to maintain O2 sat <92%.  All  Breath sounds diminished, pt winded when speaking.  Cough is fair and loose, nonproductive. Scattered BUE bruising, pale. New PIV inserted, CDI, no redness or edema noted, infusing IV abx as scheduled. POC reviewed with patient, open  Discussion facilitated, pt verbalized understanding. Comfort maintained throughout shift, no complaints of pain. Pt's [place] removed 11/4 around 5pm, UO for PM shift was 250, bladder scan showed max of 155 ml, frequent bathroom visits promoted, water running to promote urination. NPO since midnight. SCDs and TEDs in place, removed/replaced. Telemetry monitoring-NSR. Hourly rounding per unit protocol. Patient has remained free of falls, trauma, and injury this shift. Bed locked, in lowest position, bed alarm on, SR up x 2, call light within reach. Will continue to monitor, POC in progress.

## 2018-11-05 NOTE — ASSESSMENT & PLAN NOTE
Contributing Nutrition Diagnosis  Moderate malnutrition in context of acute illness    Related to (etiology):   Decreased oral intake    Signs and Symptoms (as evidenced by):   1) <75% estimated intake >7 days  2) Moderate fat loss with sunken eyes, cheeks and mild in upper arm. Mild muscle loss noted in clavicle, hands, temples.       Interventions/Recommendations (treatment strategy):  1) ADAT to cardiac with ONS 2) monitor weight and intake    Nutrition Diagnosis Status:   New

## 2018-11-05 NOTE — PROGRESS NOTES
" [facility]  Adult Nutrition  Progress Note    SUMMARY       Recommendations    Recommendation: 1) Advance to cardiac diet per pt tolerance 2) Add Boost Plus, tid when diet advanced  3) request actual weight    Intervention:   Moderate malnutrition identified per ASPEN guidelines.  Goals: 1) Pt will consume >=50% EEN by f/u   Nutrition Goal Status: new  Communication of RD Recs: (POC, sticky note)    Reason for Assessment    Reason for Assessment: length of stay  Diagnosis: pulmonary disease  Relevant Medical History: HTN, pneumonia  Interdisciplinary Rounds: did not attend  General Information Comments: Admitted with respiratory failure, SOB, coughing x 3 days. Fatigue x 1 week.  Pt just out of recovery, able to speak. Reports poor appetite pta. NFPE reveals moderate fat loss with sunken eyes, cheeks and mild in upper arm. Mild muscle loss noted in clavicle, hands, temples.   Nutrition Discharge Planning: To be determined    Nutrition Risk Screen    Nutrition Risk Screen: no indicators present    Nutrition/Diet History    Patient Reported Diet/Restrictions/Preferences: general  Do you have any cultural, spiritual, Gnosticist conflicts, given your current situation?: no  Supplemental Drinks or Food Habits: (none)  Food Allergies: NKFA(No intolerances noted. )    Anthropometrics    Temp: 98.2 °F (36.8 °C)  Height Method: Stated  Height: 5' 3"  Height (inches): 63 in  Weight Method: Stated  Weight: 58.1 kg (128 lb)  Weight (lb): 128 lb  Ideal Body Weight (IBW), Female: 115 lb  % Ideal Body Weight, Female (lb): 111.3 lb  BMI (Calculated): 22.7  Usual Body Weight (UBW), k.64 kg(per chart review 18)  % Usual Body Weight: 99.22  % Weight Change From Usual Weight: -0.99 %       Lab/Procedures/Meds    Pertinent Labs Reviewed: reviewed  Lab Results   Component Value Date    ALBUMIN 2.5 (L) 2018     Lab Results   Component Value Date    WBC 8.60 2018       Pertinent Medications " Reviewed: reviewed  Scheduled Meds:   albuterol-ipratropium  3 mL Nebulization Q8H    amitriptyline  25 mg Oral QHS    cefTRIAXone 1 g in dextrose 5 % 50 mL  1 g Intravenous Q24H    escitalopram oxalate  20 mg Oral Daily    furosemide  20 mg Oral Daily    levothyroxine  50 mcg Oral Before breakfast    metoprolol tartrate  50 mg Oral BID    nicotine  1 patch Transdermal Daily    pantoprazole  40 mg Oral Daily    polyethylene glycol  17 g Oral BID     Continuous Infusions:   lactated ringers 100 mL/hr at 11/05/18 1223       Physical Findings/Assessment    Overall Physical Appearance: advanced age, loss of muscle mass, loss of subcutaneous fat, weak  Oral/Mouth Cavity: tooth/teeth missing  Skin: (Karsten score 19)    Estimated/Assessed Needs    Weight Used For Calorie Calculations: 58.1 kg (128 lb 1.4 oz)  Energy Calorie Requirements (kcal): 4203-6005 (AF 1.3-1.5)  Energy Need Method: [place]-St Jeor  Protein Requirements:  (1.2-2.0 gm/kg/day per critical care guide)  Weight Used For Protein Calculations: 58.1 kg (128 lb 1.4 oz)     Fluid Need Method: (or per MD)  RDA Method (mL): 1605  CHO Requirement: n/a      Nutrition Prescription Ordered    Current Diet Order: NPO  Nutrition Order Comments: was on cardiac diet prior to NPO    Evaluation of Received Nutrient/Fluid Intake    IV Fluid (mL): 100(mls/hr)  Energy Calories Required: not meeting needs  Protein Required: not meeting needs  Fluid Required: meeting needs  % Intake of Estimated Energy Needs: 25 - 50 %   % Meal Intake: 25 - 50 %    Nutrition Risk    Level of Risk/Frequency of Follow-up: (2 x wkly)     Assessment and Plan    Moderate malnutrition    Contributing Nutrition Diagnosis  Moderate malnutrition in context of acute illness    Related to (etiology):   Decreased oral intake    Signs and Symptoms (as evidenced by):   1) <75% estimated intake >7 days  2) Moderate fat loss with sunken eyes, cheeks and mild in upper arm. Mild muscle loss  noted in clavicle, hands, temples.       Interventions/Recommendations (treatment strategy):  1) ADAT to cardiac with ONS 2) monitor weight and intake    Nutrition Diagnosis Status:   New            Monitor and Evaluation    Food and Nutrient Intake: energy intake  Food and Nutrient Adminstration: diet order  Physical Activity and Function: nutrition-related ADLs and IADLs  Biochemical Data, Medical Tests and Procedures: inflammatory profile  Nutrition-Focused Physical Findings: skin, overall appearance     Nutrition Follow-Up  yes

## 2018-11-05 NOTE — PLAN OF CARE
Minimal pain patient states she's tolerable, no nausea reevaluated per Anesthesia MD and  released to ICU care

## 2018-11-05 NOTE — PROGRESS NOTES
Pt c/o pain 9 out of 10.  Called Dr. Suarez because pt has no pain med ordered but took oxycodone at 12:15.  New orders for percocet received; pt has allergy to codeine so the warning came up on the order that the pt may have a reaction to percocet.  OK to override the warning per Dr. Suarez.

## 2018-11-05 NOTE — PLAN OF CARE
11/05/18 0723   Patient Assessment/Suction   Level of Consciousness (AVPU) alert   Respiratory Effort Mild;Labored   Expansion/Accessory Muscles/Retractions suprasternal retractions;supraclavicular retractions   All Lung Fields Breath Sounds diminished;clear   PRE-TX-O2-ETCO2   O2 Device (Oxygen Therapy) nasal cannula w/ humidification   $ Is the patient on Low Flow Oxygen? Yes   Flow (L/min) 3   Oxygen Concentration (%) 32   SpO2 (!) 92 %   Pulse Oximetry Type Intermittent   $ Pulse Oximetry - Multiple Charge Pulse Oximetry - Multiple   Pulse 72   Resp 20   Aerosol Therapy   $ Aerosol Therapy Charges Aerosol Treatment   Respiratory Treatment Status given   SVN/Inhaler Treatment Route mask   Position During Treatment HOB at 45 degrees   Patient Tolerance good   Post-Treatment   Post-treatment Heart Rate (beats/min) 78   Post-treatment Resp Rate (breaths/min) 20   All Fields Breath Sounds aeration increased   Vibratory PEP Therapy   $ Vibratory PEP Charges Aerobika Therapy   Administration done with encouragement   Number of Repetitions 17   Comments good   Ready to Wean/Extubation Screen   FIO2<=50 (chart decimal) 0.32

## 2018-11-06 LAB
ALBUMIN SERPL BCP-MCNC: 2.2 G/DL
ALP SERPL-CCNC: 209 U/L
ALT SERPL W/O P-5'-P-CCNC: 243 U/L
ANION GAP SERPL CALC-SCNC: 8 MMOL/L
AST SERPL-CCNC: 242 U/L
BASOPHILS # BLD AUTO: ABNORMAL K/UL
BASOPHILS NFR BLD: 0 %
BILIRUB SERPL-MCNC: 0.4 MG/DL
BUN SERPL-MCNC: 11 MG/DL
CALCIUM SERPL-MCNC: 9.1 MG/DL
CHLORIDE SERPL-SCNC: 93 MMOL/L
CO2 SERPL-SCNC: 30 MMOL/L
CREAT SERPL-MCNC: 0.7 MG/DL
DIFFERENTIAL METHOD: ABNORMAL
EOSINOPHIL # BLD AUTO: ABNORMAL K/UL
EOSINOPHIL NFR BLD: 1 %
ERYTHROCYTE [DISTWIDTH] IN BLOOD BY AUTOMATED COUNT: 13.4 %
EST. GFR  (AFRICAN AMERICAN): >60 ML/MIN/1.73 M^2
EST. GFR  (NON AFRICAN AMERICAN): >60 ML/MIN/1.73 M^2
GLUCOSE SERPL-MCNC: 153 MG/DL
HCT VFR BLD AUTO: 36.6 %
HGB BLD-MCNC: 12.1 G/DL
LYMPHOCYTES # BLD AUTO: ABNORMAL K/UL
LYMPHOCYTES NFR BLD: 3 %
MAGNESIUM SERPL-MCNC: 1.8 MG/DL
MCH RBC QN AUTO: 29.5 PG
MCHC RBC AUTO-ENTMCNC: 33.1 G/DL
MCV RBC AUTO: 89 FL
MONOCYTES # BLD AUTO: ABNORMAL K/UL
MONOCYTES NFR BLD: 7 %
NEUTROPHILS NFR BLD: 84 %
NEUTS BAND NFR BLD MANUAL: 5 %
PHOSPHATE SERPL-MCNC: 4.4 MG/DL
PLATELET # BLD AUTO: 308 K/UL
PLATELET BLD QL SMEAR: ABNORMAL
PMV BLD AUTO: 8.9 FL
POTASSIUM SERPL-SCNC: 4.4 MMOL/L
PROT SERPL-MCNC: 5.6 G/DL
RBC # BLD AUTO: 4.11 M/UL
SODIUM SERPL-SCNC: 131 MMOL/L
WBC # BLD AUTO: 19.1 K/UL

## 2018-11-06 PROCEDURE — 36415 COLL VENOUS BLD VENIPUNCTURE: CPT

## 2018-11-06 PROCEDURE — 83735 ASSAY OF MAGNESIUM: CPT

## 2018-11-06 PROCEDURE — 25000003 PHARM REV CODE 250: Performed by: INTERNAL MEDICINE

## 2018-11-06 PROCEDURE — 80053 COMPREHEN METABOLIC PANEL: CPT

## 2018-11-06 PROCEDURE — 25000003 PHARM REV CODE 250: Performed by: NURSE PRACTITIONER

## 2018-11-06 PROCEDURE — 99233 SBSQ HOSP IP/OBS HIGH 50: CPT | Mod: ,,, | Performed by: INTERNAL MEDICINE

## 2018-11-06 PROCEDURE — 94664 DEMO&/EVAL PT USE INHALER: CPT

## 2018-11-06 PROCEDURE — 63600175 PHARM REV CODE 636 W HCPCS: Performed by: INTERNAL MEDICINE

## 2018-11-06 PROCEDURE — 94799 UNLISTED PULMONARY SVC/PX: CPT

## 2018-11-06 PROCEDURE — 20000000 HC ICU ROOM

## 2018-11-06 PROCEDURE — 84100 ASSAY OF PHOSPHORUS: CPT

## 2018-11-06 PROCEDURE — 25000003 PHARM REV CODE 250: Performed by: ANESTHESIOLOGY

## 2018-11-06 PROCEDURE — 99232 SBSQ HOSP IP/OBS MODERATE 35: CPT | Mod: ,,, | Performed by: INTERNAL MEDICINE

## 2018-11-06 PROCEDURE — 94761 N-INVAS EAR/PLS OXIMETRY MLT: CPT

## 2018-11-06 PROCEDURE — 25000242 PHARM REV CODE 250 ALT 637 W/ HCPCS: Performed by: INTERNAL MEDICINE

## 2018-11-06 PROCEDURE — 99900035 HC TECH TIME PER 15 MIN (STAT)

## 2018-11-06 PROCEDURE — 85027 COMPLETE CBC AUTOMATED: CPT

## 2018-11-06 PROCEDURE — 94640 AIRWAY INHALATION TREATMENT: CPT

## 2018-11-06 PROCEDURE — 85007 BL SMEAR W/DIFF WBC COUNT: CPT

## 2018-11-06 PROCEDURE — 27000221 HC OXYGEN, UP TO 24 HOURS

## 2018-11-06 RX ADMIN — OXYCODONE HYDROCHLORIDE AND ACETAMINOPHEN 1 TABLET: 7.5; 325 TABLET ORAL at 09:11

## 2018-11-06 RX ADMIN — SODIUM CHLORIDE, SODIUM LACTATE, POTASSIUM CHLORIDE, AND CALCIUM CHLORIDE: .6; .31; .03; .02 INJECTION, SOLUTION INTRAVENOUS at 05:11

## 2018-11-06 RX ADMIN — OXYCODONE HYDROCHLORIDE AND ACETAMINOPHEN 1 TABLET: 7.5; 325 TABLET ORAL at 03:11

## 2018-11-06 RX ADMIN — ESCITALOPRAM 20 MG: 10 TABLET, FILM COATED ORAL at 09:11

## 2018-11-06 RX ADMIN — POLYETHYLENE GLYCOL 3350 17 G: 17 POWDER, FOR SOLUTION ORAL at 09:11

## 2018-11-06 RX ADMIN — LEVOTHYROXINE SODIUM 50 MCG: 50 TABLET ORAL at 05:11

## 2018-11-06 RX ADMIN — FUROSEMIDE 20 MG: 20 TABLET ORAL at 09:11

## 2018-11-06 RX ADMIN — IPRATROPIUM BROMIDE AND ALBUTEROL SULFATE 3 ML: .5; 3 SOLUTION RESPIRATORY (INHALATION) at 03:11

## 2018-11-06 RX ADMIN — AMITRIPTYLINE HYDROCHLORIDE 25 MG: 25 TABLET, FILM COATED ORAL at 09:11

## 2018-11-06 RX ADMIN — IPRATROPIUM BROMIDE AND ALBUTEROL SULFATE 3 ML: .5; 3 SOLUTION RESPIRATORY (INHALATION) at 07:11

## 2018-11-06 RX ADMIN — METOPROLOL TARTRATE 50 MG: 50 TABLET ORAL at 09:11

## 2018-11-06 RX ADMIN — PANTOPRAZOLE SODIUM 40 MG: 40 TABLET, DELAYED RELEASE ORAL at 09:11

## 2018-11-06 RX ADMIN — CEFTRIAXONE 1 G: 1 INJECTION, SOLUTION INTRAVENOUS at 05:11

## 2018-11-06 NOTE — PROGRESS NOTES
Progress Note  Cardiothoracic Surgery    Admit Date: 10/26/2018  Post-operative Day: 1 Day Post-Op  Hospital Day: 12    SUBJECTIVE:     Follow-up For: Procedure(s) (LRB):  VATS, WITH PLEURA BIOPSY (Left)  BIOPSY, LUNG, THORACOSCOPIC (Left)  VATS, WITH PLEURODESIS (Left)    Scheduled Meds:   albuterol-ipratropium  3 mL Nebulization Q8H    amitriptyline  25 mg Oral QHS    cefTRIAXone 1 g in dextrose 5 % 50 mL  1 g Intravenous Q24H    escitalopram oxalate  20 mg Oral Daily    furosemide  20 mg Oral Daily    levothyroxine  50 mcg Oral Before breakfast    metoprolol tartrate  50 mg Oral BID    nicotine  1 patch Transdermal Daily    pantoprazole  40 mg Oral Daily    polyethylene glycol  17 g Oral BID     Infusions/Drips:   lactated ringers 100 mL/hr at 11/06/18 0555     PRN Meds: acetaminophen, dextrose 50%, dextrose 50%, glucagon (human recombinant), glucose, glucose, LORazepam, ondansetron, oxyCODONE-acetaminophen, sodium chloride 0.9%    Review of patient's allergies indicates:   Allergen Reactions    Codeine Hives    Penicillins Hives       OBJECTIVE:     Vital Signs (Most Recent)  Temp: 98.5 °F (36.9 °C) (11/06/18 1400)  Pulse: 75 (11/06/18 1518)  Resp: (!) 24 (11/06/18 1518)  BP: (!) 123/58 (11/06/18 1500)  SpO2: 96 % (11/06/18 1518)    Admission Weight: 54.4 kg (120 lb) (10/26/18 2105)   Most Recent Weight: 50.2 kg (110 lb 10.7 oz) (11/06/18 0600)    Vital Signs Range (Last 24H):  Temp:  [97.6 °F (36.4 °C)-98.5 °F (36.9 °C)]   Pulse:  [72-99]   Resp:  [12-60]   BP: (102-138)/(54-62)   SpO2:  [91 %-98 %]     I & O (Last 24H):    Intake/Output Summary (Last 24 hours) at 11/6/2018 1607  Last data filed at 11/6/2018 1258  Gross per 24 hour   Intake 2131.67 ml   Output 2180 ml   Net -48.33 ml     Physical Exam:  NAD  BS Decreased on left  CV RRR  Incision clean and dry    Drain tapering output, serous    Laboratory:  CBC:   Recent Labs   Lab 11/06/18  0311   WBC 19.10*   RBC 4.11   HGB 12.1   HCT 36.6*       MCV 89   MCH 29.5   MCHC 33.1     BMP:   Recent Labs   Lab 11/06/18  0311   *   *   K 4.4   CL 93*   CO2 30*   BUN 11   CREATININE 0.7   CALCIUM 9.1   MG 1.8     Microbiology Results (last 7 days)     Procedure Component Value Units Date/Time    Culture, Body Fluid (Aerobic) w/ GS [[id number]] Collected:  10/29/18 1815    Order Status:  Completed Specimen:  Body Fluid from Pleural Fluid Updated:  11/03/18 1039     AEROBIC CULTURE - FLUID No growth     Gram Stain Result Cytospin indicates:      Many WBC's      No organisms seen    Culture, Respiratory with Gram Stain [[id number]]  (Susceptibility) Collected:  10/29/18 1401    Order Status:  Completed Specimen:  Respiratory from Sputum, Expectorated Updated:  11/01/18 1313     Respiratory Culture --     CANDIDA ALBICANS  Moderate       Respiratory Culture --     STENOTROPHOMONAS (X.) MALTOPHILIA  Rare       Gram Stain (Respiratory) <10 epithelial cells per low power field.     Gram Stain (Respiratory) Few WBC's     Gram Stain (Respiratory) Moderate yeast     Gram Stain (Respiratory) Rare Gram positive rods    Blood culture x two cultures. Draw prior to antibiotics. [[id number]] Collected:  10/26/18 2348    Order Status:  Completed Specimen:  Blood from Peripheral, Left  Arm Updated:  11/01/18 1212     Blood Culture, Routine No growth after 5 days.    Narrative:       Aerobic and anaerobic    Blood culture x two cultures. Draw prior to antibiotics. [[id number]] Collected:  10/26/18 2348    Order Status:  Completed Specimen:  Blood from Peripheral, Right  Wrist Updated:  11/01/18 1212     Blood Culture, Routine No growth after 5 days.    Narrative:       Aerobic and anaerobic        Specimen (12h ago, onward)    None          Diagnostic Results:  CXR : Anticipated post op changes. Hilar mass now apparent?    ASSESSMENT/PLAN:     Assessment: s/p L VATS/Biopsy/Pleurodesis    Plan: Continue present management

## 2018-11-06 NOTE — PROGRESS NOTES
Progress Note  Hospital Medicine  Patient Name:Bertha Kelly  MRN:  [id number]  Patient Class: IP- Inpatient  Admit Date: 10/26/2018  Length of Stay: 10 days  Expected Discharge Date:   Attending Physician: Vicki Suarez MD  Primary Care Provider:  James Colon MD    SUBJECTIVE:     Principal Problem: Pleural effusion  Initial history of present illness: Bertha Kelly is a 67 y/o female with a PMHx of HTN and thyroid disease who presented to the ED today with c/o SOB which began 3 weeks ago and has progressively gotten worse.  Pt states that she becomes SOB with activity.  She reports that she received a pneumonia vaccine at her doctor's office about a month ago and began having some chest congestion and cough a few days later.  She is a current smoker of about 1/2 pack daily.  Reports increased fatigue and chills, but denies fever, chest pain, N/V, and dysuria.  Reports weight loss of a few pounds in the past 3 weeks due to poor appetite, but states that prior to this illness, weight was stable and she had a very good appetite.  Pt's CXR reveals a LLL pneumonia and was noted to be mildly hypoxic initially requiring supplemental oxygen.  She will be admitted to the service of hospital medicine for further treatment.    PMH/PSH/SH/FH/Meds: reviewed.    Symptoms/Review of Systems: In ICU s/p VATS, left pleural biopsy, lung biopsy and left pleurodesis. On 2 L/min via NC.   Diet: Cardiac  Activity level: Up with assistance  Pain:  Patient reports no pain.       OBJECTIVE:   Vital Signs (Most Recent):      Temp: 97.7 °F (36.5 °C) (11/06/18 0330)  Pulse: 86 (11/06/18 0717)  Resp: (!) 22 (11/06/18 0717)  BP: (!) 114/56 (11/06/18 0330)  SpO2: 96 % (11/06/18 0717)       Vital Signs Range (Last 24H):  Temp:  [97.6 °F (36.4 °C)-98.5 °F (36.9 °C)]   Pulse:  [73-86]   Resp:  [12-60]   BP: ()/(54-74)   SpO2:  [93 %-99 %]     Weight: 50.2 kg (110 lb 10.7 oz)  Body mass index is 19.6 kg/m².    Intake/Output Summary (Last  24 hours) at 11/6/2018 0819  Last data filed at 11/6/2018 0600  Gross per 24 hour   Intake 3553.34 ml   Output 1765 ml   Net 1788.34 ml     Physical Examination:  Constitutional: She is oriented to person, place, and time. She appears well-developed and well-nourished. No distress.   HENT:   Head: Normocephalic and atraumatic.   Eyes: Pupils are equal, round, and reactive to light.   Neck: Neck supple. No thyromegaly present.   Cardiovascular: Normal rate and regular rhythm. Exam reveals no gallop and no friction rub.   No murmur heard.  Pulmonary/Chest: No respiratory distress. She has wheezes. Left chest tube is in place  Abdominal: Soft. Bowel sounds are normal. She exhibits no distension. There is no tenderness. There is no guarding.   Musculoskeletal: Normal range of motion. She exhibits no edema.   Neurological: She is alert and oriented to person, place, and time.   Skin: Skin is warm and dry. No erythema.   Psychiatric: She has a normal mood and affect.     CBC:  Recent Labs   Lab 11/04/18 0516 11/05/18 0415 11/06/18 0311   WBC 8.10 8.60 19.10*   RBC 4.51 4.46 4.11   HGB 13.4 13.4 12.1   HCT 40.6 40.0 36.6*    303 308   MCV 90 90 89   MCH 29.6 30.0 29.5   MCHC 33.0 33.4 33.1   BMP  Recent Labs   Lab 11/04/18 0516 11/05/18 0415 11/06/18  0311    118* 153*    136 131*   K 3.9 3.8 4.4   CL 89* 91* 93*   CO2 39* 34* 30*   BUN 11 11 11   CREATININE 0.8 0.8 0.7   CALCIUM 9.5 9.5 9.1   MG 1.9 2.0 1.8      Diagnostic Results:  Microbiology Results (last 7 days)     Procedure Component Value Units Date/Time    Culture, Body Fluid (Aerobic) w/ GS [[id number]] Collected:  10/29/18 1815    Order Status:  Completed Specimen:  Body Fluid from Pleural Fluid Updated:  11/03/18 1039     AEROBIC CULTURE - FLUID No growth     Gram Stain Result Cytospin indicates:      Many WBC's      No organisms seen    Culture, Respiratory with Gram Stain [[id number]]  (Susceptibility) Collected:  10/29/18 6735     Order Status:  Completed Specimen:  Respiratory from Sputum, Expectorated Updated:  11/01/18 1313     Respiratory Culture --     CANDIDA ALBICANS  Moderate       Respiratory Culture --     STENOTROPHOMONAS (X.) MALTOPHILIA  Rare       Gram Stain (Respiratory) <10 epithelial cells per low power field.     Gram Stain (Respiratory) Few WBC's     Gram Stain (Respiratory) Moderate yeast     Gram Stain (Respiratory) Rare Gram positive rods    Blood culture x two cultures. Draw prior to antibiotics. [[id number]] Collected:  10/26/18 2348    Order Status:  Completed Specimen:  Blood from Peripheral, Left  Arm Updated:  11/01/18 1212     Blood Culture, Routine No growth after 5 days.    Narrative:       Aerobic and anaerobic    Blood culture x two cultures. Draw prior to antibiotics. [[id number]] Collected:  10/26/18 2348    Order Status:  Completed Specimen:  Blood from Peripheral, Right  Wrist Updated:  11/01/18 1212     Blood Culture, Routine No growth after 5 days.    Narrative:       Aerobic and anaerobic         CXR: The cardiomediastinal silhouette is normal in appearance.  No pulmonary vascular congestion appreciated. There is dense airspace consolidative change present at the left lung base and there is patchy segmental airspace opacity in the left mid and upper lung zones.  Differential considerations include pneumonia and aspiration.  Underlying pleural fluid cannot be excluded.  An underlying mass would be difficult to exclude.  Serial radiography will be necessary to ensure resolution and exclude an underlying lesion.  There is an age-indeterminate, possibly remote, right posterior 10th rib fracture.    CXR:  Unchanged small left apical pneumothorax. Unchanged moderate left and small right pleural effusions and multifocal airspace disease.    CT chest:  Small left pneumothorax status post preceding left thoracentesis.  Less than 10%.  Moderate left and small right pleural effusions and moderate left lung and mild  right lower lobe consolidation/atelectasis.  Enlarged prevascular mediastinal lymph node.  Additional nodular opacities at the cardiac apex which may represent additional prominent lymph nodes, or possibly pleural based nodules.  Moderate emphysema.  A distinct lung mass is not visible however follow-up to resolution of the pulmonary consolidation is recommended to evaluate for on the underlying lung mass, considering the enlarged mediastinal lymph node.  Further evaluation with CT chest with contrast may also be useful if the patient can receive IV contrast. 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    CXR: Unchanged small left apical pneumothorax. Unchanged moderate left and small right pleural effusions and multifocal airspace disease.    CXR: Continued dense infiltrate in the left mid and lower lung field with atelectasis and moderate to large left pleural effusion which obscures the heart size.  Small infiltrate at the right lung base and small right pleural effusion as well.    ECHO:  · Left ventricle ejection fraction is at 60%  · No wall motion abnormalities.  · Grade I (mild) left ventricular diastolic dysfunction consistent with impaired relaxation.  · LA pressure is normal.  · Mitral valve is mildly sclerotic  · There is a moderate left pleural effusion.  · Technically very difficult study    Assessment/Plan:     * Acute respiratory failure with hypoxia  Large left para-pneumonic effusion s/p thoracentesis  Lung Adenocarcinoma s/p VATS, left pleural biopsy, lung biopsy and left pleurodesis     Close monitoring in ICU. Follow pulmonary and CTS recommendations.   Continue supplemental oxygen as needed.  Nebulizer treatments   Monitor and treat as clinically indicated.      Tobacco abuse     Smoking cessation counseling performed. Dangers of cigarette  smoking were reviewed with patient in detail and patient was encouraged to quit.      Generalized anxiety disorder     Chronic; currently controlled.  Continue home regimen.      Essential hypertension     Chronic; stable.  Continue chronic meds.      Hypothyroidism due to Hashimoto's thyroiditis     Chronic; pt states controlled.  Continue levothyroxine.      Pneumonia of left lower lobe due to infectious organism     Presents with SOB; LLL pneumonia identified on CXR.    Initiate treatment regimen for CAP, which includes a beta lactam and macrolide.   IV Rocephin and IV azithromycin.  Nebulizer treatments     Discussed with patient's .   VTE Risk Mitigation (From admission, onward)        Ordered     IP VTE HIGH RISK PATIENT  Once . Start Lovenox 40 mg SQ q day once okay with Dr. Jones.    10/27/18 0207     Place EVELYNE hose  Until discontinued      10/27/18 0207     Place sequential compression device  Until discontinued      10/27/18 0207        Vicki Suarez MD  Department of Hospital Medicine   [facility]

## 2018-11-06 NOTE — PROGRESS NOTES
Progress Note  PULMONARY    Admit Date: 10/26/2018   11/05/2018      SUBJECTIVE:     Oct 30, breathing better, some appetite.  Oct 31, no c/o, tried pull out lines last pm, denies sob.  Nov 1, no c/o, says breathing and eating ok?  Nov 5, post pleurodesis. Confused.  No c/o      PFSH and Allergies reviewed.    OBJECTIVE:     Vitals (Most recent):  Vitals:    11/05/18 1552   BP:    Pulse: 79   Resp: 20   Temp:        Vitals (24 hour range):  Temp:  [97.6 °F (36.4 °C)-98.6 °F (37 °C)]   Pulse:  [67-85]   Resp:  [16-27]   BP: (107-132)/(56-71)   SpO2:  [90 %-99 %]       Intake/Output Summary (Last 24 hours) at 11/5/2018 1838  Last data filed at 11/5/2018 1800  Gross per 24 hour   Intake 2043.34 ml   Output 920 ml   Net 1123.34 ml          Physical Exam:  The patient's neuro status (alertness,orientation,cognitive function,motor skills,), pharyngeal exam (oral lesions, hygiene, abn dentition,), Neck (jvd,mass,thyroid,nodes in neck and above/below clavicle),RESPIRATORY(symmetry,effort,fremitus,percussion,auscultation),  Cor(rhythm,heart tones including gallops,perfusion,edema)ABD(distention,hepatic&splenomegaly,tenderness,masses), Skin(rash,cyanosis),Psyc(affect,judgement,).  Exam negative except for these pertinent findings:    Alert, confuse, left chest tube    Radiographs reviewed: view by direct vision  Ct bilat effusion, dense left lower lung.  cxr left chest tube  Looks good.    Results for orders placed during the hospital encounter of 10/26/18   X-Ray Chest 1 View    Narrative EXAMINATION:  XR CHEST 1 VIEW    CLINICAL HISTORY:  post thoracentesis;    TECHNIQUE:  Single frontal view of the chest was performed.    COMPARISON:  Chest radiograph 10/29/2018; CT dated 10/30/2018    FINDINGS:  Bibasilar airspace disease.  Normal size heart. Indistinct pulmonary vasculature. Blunted costophrenic angles.  Left apical pneumothorax.      Impression 1. Post thoracentesis with reduction in size of left pleural effusion and  development of apical pneumothorax.  This is more conspicuous on subsequent CT.  2. Small right pleural effusion.  3. Bibasilar airspace disease which could reflect atelectasis, pneumonia, edema.      Electronically signed by: Demar Ann  Date:    10/30/2018  Time:    10:33   ]    Labs     Recent Labs   Lab 11/05/18 0415   WBC 8.60   HGB 13.4   HCT 40.0      Electronically reviewed and signed by:   Ursula Chow MD   Signed on 10/30/18 at 13:20   Pathologist review of pleural fluid cell count:   The cytospin shows a predominance of mature appearing lymphocytes   with rare neutrophils and eosinophils seen in the background as well   as an occasional atypical mesothelial cell, favor reactive.     Correlate clinically.  Recent Labs   Lab 11/05/18 0415      K 3.8   CL 91*   CO2 34*   BUN 11   CREATININE 0.8   *   CALCIUM 9.5   MG 2.0   PHOS 3.7   *   *   ALKPHOS 185*   BILITOT 0.3   PROT 6.3   ALBUMIN 2.5*   INR 1.0   No results for input(s): PH, PCO2, PO2, HCO3 in the last 24 hours.  Microbiology Results (last 7 days)     Procedure Component Value Units Date/Time    Culture, Body Fluid (Aerobic) w/ GS [[id number]] Collected:  10/29/18 1815    Order Status:  Completed Specimen:  Body Fluid from Pleural Fluid Updated:  11/03/18 1039     AEROBIC CULTURE - FLUID No growth     Gram Stain Result Cytospin indicates:      Many WBC's      No organisms seen    Culture, Respiratory with Gram Stain [[id number]]  (Susceptibility) Collected:  10/29/18 1401    Order Status:  Completed Specimen:  Respiratory from Sputum, Expectorated Updated:  11/01/18 1313     Respiratory Culture --     CANDIDA ALBICANS  Moderate       Respiratory Culture --     STENOTROPHOMONAS (X.) MALTOPHILIA  Rare       Gram Stain (Respiratory) <10 epithelial cells per low power field.     Gram Stain (Respiratory) Few WBC's     Gram Stain (Respiratory) Moderate yeast     Gram Stain (Respiratory) Rare Gram positive rods     Blood culture x two cultures. Draw prior to antibiotics. [[id number]] Collected:  10/26/18 2348    Order Status:  Completed Specimen:  Blood from Peripheral, Left  Arm Updated:  11/01/18 1212     Blood Culture, Routine No growth after 5 days.    Narrative:       Aerobic and anaerobic    Blood culture x two cultures. Draw prior to antibiotics. [[id number]] Collected:  10/26/18 2348    Order Status:  Completed Specimen:  Blood from Peripheral, Right  Wrist Updated:  11/01/18 1212     Blood Culture, Routine No growth after 5 days.    Narrative:       Aerobic and anaerobic          Impression:  Active Hospital Problems    Diagnosis  POA    *Pleural effusion [J90]  Yes    Moderate malnutrition [E44.0]  Yes    Wheeze [R06.2]  Yes    Anorexia [R63.0]  Yes    SOB (shortness of breath) [R06.02]  Yes    Pneumonia of left lower lobe due to infectious organism [J18.1]  Yes    Hypothyroidism due to Hashimoto's thyroiditis [E03.8, E06.3]  Yes    Essential hypertension [I10]  Yes    Generalized anxiety disorder [F41.1]  Yes    Acute respiratory failure with hypoxia [J96.01]  Yes    Tobacco abuse [Z72.0]  Yes      Resolved Hospital Problems   No resolved problems to display.               Plan:     Oct 30, fluid was lymph predominate exudate.  Cancer concerning.  F/u cxr am - if fluid building up would recommend thoracoscopic procedure with Dr Jones.  Cytology pending.  procalcitonin was very low.  Check d dimer and bnp am.  Discussed with pt and family.  Oct 31, bnp up, cytospin cells not felt malignant - cytology pending.  D dimer good.  cxr left effusion increasing. Dose lasix, check echo.  May need Dr Jones for procedure?  F/u cxr am to decide.    Nov 1, echo na, cxr worsening - fluid rapidly returning.  S. Maltophilia in sputum but procalcitonin was low.  Cytology na.  Discussed with Dr Suarez.      Pt maybe a little better but is non ambulatory and frail ( too frail to do therapy).  Will dose lasix 20, ask   Robert to see.  Pt wanting to go home????    lft sl up and co2 rising.    Nov 5, cytology with adenoca c/w lung. Post pleurodysis now.     Consider oncology consult to facilitate disposition.  Pt not thriving.                            .

## 2018-11-06 NOTE — PROGRESS NOTES
Progress Note  PULMONARY    Admit Date: 10/26/2018   11/06/2018      SUBJECTIVE:     Oct 30, breathing better, some appetite.  Oct 31, no c/o, tried pull out lines last pm, denies sob.  Nov 1, no c/o, says breathing and eating ok?  Nov 5, post pleurodesis. Confused.  No c/o  Nov 6, less confused.  No c/o      PFSH and Allergies reviewed.    OBJECTIVE:     Vitals (Most recent):  Vitals:    11/06/18 0717   BP:    Pulse: 86   Resp: (!) 22   Temp:        Vitals (24 hour range):  Temp:  [97.6 °F (36.4 °C)-98.5 °F (36.9 °C)]   Pulse:  [73-86]   Resp:  [12-60]   BP: ()/(54-74)   SpO2:  [93 %-99 %]       Intake/Output Summary (Last 24 hours) at 11/6/2018 0756  Last data filed at 11/6/2018 0600  Gross per 24 hour   Intake 3553.34 ml   Output 1765 ml   Net 1788.34 ml          Physical Exam:  The patient's neuro status (alertness,orientation,cognitive function,motor skills,), pharyngeal exam (oral lesions, hygiene, abn dentition,), Neck (jvd,mass,thyroid,nodes in neck and above/below clavicle),RESPIRATORY(symmetry,effort,fremitus,percussion,auscultation),  Cor(rhythm,heart tones including gallops,perfusion,edema)ABD(distention,hepatic&splenomegaly,tenderness,masses), Skin(rash,cyanosis),Psyc(affect,judgement,).  Exam negative except for these pertinent findings:    Alert, confuse, left chest tube    Radiographs reviewed: view by direct vision  Ct bilat effusion, dense left lower lung.  cxr left chest tube  Looks good - 11/6    Results for orders placed during the hospital encounter of 10/26/18   X-Ray Chest 1 View    Narrative EXAMINATION:  XR CHEST 1 VIEW    CLINICAL HISTORY:  post thoracentesis;    TECHNIQUE:  Single frontal view of the chest was performed.    COMPARISON:  Chest radiograph 10/29/2018; CT dated 10/30/2018    FINDINGS:  Bibasilar airspace disease.  Normal size heart. Indistinct pulmonary vasculature. Blunted costophrenic angles.  Left apical pneumothorax.      Impression 1. Post thoracentesis with  reduction in size of left pleural effusion and development of apical pneumothorax.  This is more conspicuous on subsequent CT.  2. Small right pleural effusion.  3. Bibasilar airspace disease which could reflect atelectasis, pneumonia, edema.      Electronically signed by: Demar Ann  Date:    10/30/2018  Time:    10:33   ]    Labs     Recent Labs   Lab 11/06/18  0311   WBC 19.10*   HGB 12.1   HCT 36.6*      BAND 5.0   Electronically reviewed and signed by:   Ursula Chow MD   Signed on 10/30/18 at 13:20   Pathologist review of pleural fluid cell count:   The cytospin shows a predominance of mature appearing lymphocytes   with rare neutrophils and eosinophils seen in the background as well   as an occasional atypical mesothelial cell, favor reactive.     Correlate clinically.  Recent Labs   Lab 11/06/18  0311   *   K 4.4   CL 93*   CO2 30*   BUN 11   CREATININE 0.7   *   CALCIUM 9.1   MG 1.8   PHOS 4.4   *   *   ALKPHOS 209*   BILITOT 0.4   PROT 5.6*   ALBUMIN 2.2*   No results for input(s): PH, PCO2, PO2, HCO3 in the last 24 hours.  Microbiology Results (last 7 days)     Procedure Component Value Units Date/Time    Culture, Body Fluid (Aerobic) w/ GS [[id number]] Collected:  10/29/18 1815    Order Status:  Completed Specimen:  Body Fluid from Pleural Fluid Updated:  11/03/18 1039     AEROBIC CULTURE - FLUID No growth     Gram Stain Result Cytospin indicates:      Many WBC's      No organisms seen    Culture, Respiratory with Gram Stain [[id number]]  (Susceptibility) Collected:  10/29/18 1401    Order Status:  Completed Specimen:  Respiratory from Sputum, Expectorated Updated:  11/01/18 1313     Respiratory Culture --     CANDIDA ALBICANS  Moderate       Respiratory Culture --     STENOTROPHOMONAS (X.) MALTOPHILIA  Rare       Gram Stain (Respiratory) <10 epithelial cells per low power field.     Gram Stain (Respiratory) Few WBC's     Gram Stain (Respiratory) Moderate yeast      Gram Stain (Respiratory) Rare Gram positive rods    Blood culture x two cultures. Draw prior to antibiotics. [[id number]] Collected:  10/26/18 2348    Order Status:  Completed Specimen:  Blood from Peripheral, Left  Arm Updated:  11/01/18 1212     Blood Culture, Routine No growth after 5 days.    Narrative:       Aerobic and anaerobic    Blood culture x two cultures. Draw prior to antibiotics. [[id number]] Collected:  10/26/18 2348    Order Status:  Completed Specimen:  Blood from Peripheral, Right  Wrist Updated:  11/01/18 1212     Blood Culture, Routine No growth after 5 days.    Narrative:       Aerobic and anaerobic          Impression:  Active Hospital Problems    Diagnosis  POA    *Pleural effusion [J90]  Yes    Moderate malnutrition [E44.0]  Yes    Wheeze [R06.2]  Yes    Anorexia [R63.0]  Yes    SOB (shortness of breath) [R06.02]  Yes    Pneumonia of left lower lobe due to infectious organism [J18.1]  Yes    Hypothyroidism due to Hashimoto's thyroiditis [E03.8, E06.3]  Yes    Essential hypertension [I10]  Yes    Generalized anxiety disorder [F41.1]  Yes    Acute respiratory failure with hypoxia [J96.01]  Yes    Tobacco abuse [Z72.0]  Yes      Resolved Hospital Problems   No resolved problems to display.               Plan:     Oct 30, fluid was lymph predominate exudate.  Cancer concerning.  F/u cxr am - if fluid building up would recommend thoracoscopic procedure with Dr Jones.  Cytology pending.  procalcitonin was very low.  Check d dimer and bnp am.  Discussed with pt and family.  Oct 31, bnp up, cytospin cells not felt malignant - cytology pending.  D dimer good.  cxr left effusion increasing. Dose lasix, check echo.  May need Dr Jones for procedure?  F/u cxr am to decide.    Nov 1, echo na, cxr worsening - fluid rapidly returning.  S. Maltophilia in sputum but procalcitonin was low.  Cytology na.  Discussed with Dr Suarez.      Pt maybe a little better but is non ambulatory and frail (  too frail to do therapy).  Will dose lasix 20, ask Dr Jones to see.  Pt wanting to go home????    lft sl up and co2 rising.    Nov 5, cytology with adenoca c/w lung. Post pleurodysis now.     Consider oncology consult to facilitate disposition.  Pt not thriving.    Nov 6, min confused, looks to be progressing, needs mobilize.                          .

## 2018-11-06 NOTE — PLAN OF CARE
Problem: Patient Care Overview  Goal: Plan of Care Review  Outcome: Ongoing (interventions implemented as appropriate)  Ensured patient safety with frequent checks, assessing pain and chest tube drainage. Patient remains free of falls and free of injury. Patient remains with -20 for suction, no air leak noted. Patient remained with piv x2 and lr at 100. Patient remains pleasantly confused and easily oriented. Will continue to monitor.

## 2018-11-06 NOTE — PLAN OF CARE
11/05/18 2334   Patient Assessment/Suction   Level of Consciousness (AVPU) alert   Respiratory Effort Unlabored   All Lung Fields Breath Sounds diminished   PRE-TX-O2-ETCO2   O2 Device (Oxygen Therapy) nasal cannula   $ Is the patient on Low Flow Oxygen? Yes   Flow (L/min) 3   SpO2 97 %   Pulse Oximetry Type Continuous   $ Pulse Oximetry - Multiple Charge Pulse Oximetry - Multiple   Pulse 78   Resp 17   Aerosol Therapy   $ Aerosol Therapy Charges Aerosol Treatment   Respiratory Treatment Status given   SVN/Inhaler Treatment Route with air;mask   Position During Treatment HOB at 45 degrees   Patient Tolerance good   Post-Treatment   Post-treatment Heart Rate (beats/min) 80   Post-treatment Resp Rate (breaths/min) 18   All Fields Breath Sounds unchanged   Vibratory PEP Therapy   $ Vibratory PEP Charges Aerobika Therapy   $ Vibratory PEP Tech Time Charges 15 min   Type Oscillating PEP Therapy   Administration done with encouragement   Number of Repetitions 10   Comments fair

## 2018-11-06 NOTE — PROGRESS NOTES
11/06/18 0717   Patient Assessment/Suction   Level of Consciousness (AVPU) alert   All Lung Fields Breath Sounds clear;diminished   Cough Type none   PRE-TX-O2-ETCO2   O2 Device (Oxygen Therapy) nasal cannula   $ Is the patient on Low Flow Oxygen? Yes   Flow (L/min) 3   SpO2 96 %   Pulse Oximetry Type Continuous   $ Pulse Oximetry - Multiple Charge Pulse Oximetry - Multiple   Pulse 86   Resp (!) 22   Aerosol Therapy   $ Aerosol Therapy Charges Aerosol Treatment   Respiratory Treatment Status given   SVN/Inhaler Treatment Route mouthpiece   Position During Treatment HOB at 90 degrees   Patient Tolerance good   Post-Treatment   Post-treatment Heart Rate (beats/min) 86   Post-treatment Resp Rate (breaths/min) 20   All Fields Breath Sounds unchanged   Vibratory PEP Therapy   $ Vibratory PEP Charges Aerobika Therapy   $ Vibratory PEP Tech Time Charges 15 min   Type Oscillating PEP Therapy   Administration done with encouragement   Number of Repetitions 10

## 2018-11-07 LAB
ALBUMIN SERPL BCP-MCNC: 1.8 G/DL
ALP SERPL-CCNC: 266 U/L
ALT SERPL W/O P-5'-P-CCNC: 363 U/L
ANION GAP SERPL CALC-SCNC: 7 MMOL/L
AST SERPL-CCNC: 449 U/L
BASOPHILS # BLD AUTO: 0.1 K/UL
BASOPHILS NFR BLD: 0.5 %
BILIRUB SERPL-MCNC: 0.6 MG/DL
BUN SERPL-MCNC: 10 MG/DL
CALCIUM SERPL-MCNC: 8.6 MG/DL
CEA SERPL-MCNC: 41 NG/ML
CHLORIDE SERPL-SCNC: 95 MMOL/L
CO2 SERPL-SCNC: 32 MMOL/L
CREAT SERPL-MCNC: 0.7 MG/DL
DIFFERENTIAL METHOD: ABNORMAL
EOSINOPHIL # BLD AUTO: 0.1 K/UL
EOSINOPHIL NFR BLD: 0.5 %
ERYTHROCYTE [DISTWIDTH] IN BLOOD BY AUTOMATED COUNT: 13.8 %
EST. GFR  (AFRICAN AMERICAN): >60 ML/MIN/1.73 M^2
EST. GFR  (NON AFRICAN AMERICAN): >60 ML/MIN/1.73 M^2
GLUCOSE SERPL-MCNC: 86 MG/DL
HCT VFR BLD AUTO: 32.7 %
HGB BLD-MCNC: 10.9 G/DL
LYMPHOCYTES # BLD AUTO: 1.6 K/UL
LYMPHOCYTES NFR BLD: 12.5 %
MAGNESIUM SERPL-MCNC: 1.7 MG/DL
MCH RBC QN AUTO: 30 PG
MCHC RBC AUTO-ENTMCNC: 33.3 G/DL
MCV RBC AUTO: 90 FL
MONOCYTES # BLD AUTO: 2.1 K/UL
MONOCYTES NFR BLD: 16.1 %
NEUTROPHILS # BLD AUTO: 9.3 K/UL
NEUTROPHILS NFR BLD: 70.4 %
PHOSPHATE SERPL-MCNC: 3.3 MG/DL
PLATELET # BLD AUTO: 275 K/UL
PMV BLD AUTO: 9.3 FL
POTASSIUM SERPL-SCNC: 4.1 MMOL/L
PROT SERPL-MCNC: 4.9 G/DL
RBC # BLD AUTO: 3.63 M/UL
SODIUM SERPL-SCNC: 134 MMOL/L
WBC # BLD AUTO: 13.2 K/UL

## 2018-11-07 PROCEDURE — 25000003 PHARM REV CODE 250: Performed by: INTERNAL MEDICINE

## 2018-11-07 PROCEDURE — 36415 COLL VENOUS BLD VENIPUNCTURE: CPT

## 2018-11-07 PROCEDURE — 94761 N-INVAS EAR/PLS OXIMETRY MLT: CPT

## 2018-11-07 PROCEDURE — 99233 SBSQ HOSP IP/OBS HIGH 50: CPT | Mod: ,,, | Performed by: INTERNAL MEDICINE

## 2018-11-07 PROCEDURE — 20000000 HC ICU ROOM

## 2018-11-07 PROCEDURE — 25000003 PHARM REV CODE 250: Performed by: NURSE PRACTITIONER

## 2018-11-07 PROCEDURE — 99231 SBSQ HOSP IP/OBS SF/LOW 25: CPT | Mod: ,,, | Performed by: INTERNAL MEDICINE

## 2018-11-07 PROCEDURE — 84100 ASSAY OF PHOSPHORUS: CPT

## 2018-11-07 PROCEDURE — 80053 COMPREHEN METABOLIC PANEL: CPT

## 2018-11-07 PROCEDURE — 99223 1ST HOSP IP/OBS HIGH 75: CPT | Mod: ,,, | Performed by: INTERNAL MEDICINE

## 2018-11-07 PROCEDURE — 94664 DEMO&/EVAL PT USE INHALER: CPT

## 2018-11-07 PROCEDURE — 25500020 PHARM REV CODE 255

## 2018-11-07 PROCEDURE — S4991 NICOTINE PATCH NONLEGEND: HCPCS | Performed by: NURSE PRACTITIONER

## 2018-11-07 PROCEDURE — 99900035 HC TECH TIME PER 15 MIN (STAT)

## 2018-11-07 PROCEDURE — 27000221 HC OXYGEN, UP TO 24 HOURS

## 2018-11-07 PROCEDURE — 94640 AIRWAY INHALATION TREATMENT: CPT

## 2018-11-07 PROCEDURE — 83735 ASSAY OF MAGNESIUM: CPT

## 2018-11-07 PROCEDURE — 63600175 PHARM REV CODE 636 W HCPCS: Performed by: INTERNAL MEDICINE

## 2018-11-07 PROCEDURE — 82378 CARCINOEMBRYONIC ANTIGEN: CPT

## 2018-11-07 PROCEDURE — 25000242 PHARM REV CODE 250 ALT 637 W/ HCPCS: Performed by: INTERNAL MEDICINE

## 2018-11-07 PROCEDURE — 25000003 PHARM REV CODE 250: Performed by: ANESTHESIOLOGY

## 2018-11-07 PROCEDURE — 85025 COMPLETE CBC W/AUTO DIFF WBC: CPT

## 2018-11-07 PROCEDURE — 94799 UNLISTED PULMONARY SVC/PX: CPT

## 2018-11-07 RX ORDER — ENOXAPARIN SODIUM 100 MG/ML
40 INJECTION SUBCUTANEOUS EVERY 24 HOURS
Status: DISCONTINUED | OUTPATIENT
Start: 2018-11-07 | End: 2018-11-14 | Stop reason: HOSPADM

## 2018-11-07 RX ADMIN — NICOTINE 1 PATCH: 14 PATCH, EXTENDED RELEASE TRANSDERMAL at 09:11

## 2018-11-07 RX ADMIN — ENOXAPARIN SODIUM 40 MG: 100 INJECTION SUBCUTANEOUS at 05:11

## 2018-11-07 RX ADMIN — OXYCODONE HYDROCHLORIDE AND ACETAMINOPHEN 1 TABLET: 7.5; 325 TABLET ORAL at 12:11

## 2018-11-07 RX ADMIN — OXYCODONE HYDROCHLORIDE AND ACETAMINOPHEN 1 TABLET: 7.5; 325 TABLET ORAL at 06:11

## 2018-11-07 RX ADMIN — PANTOPRAZOLE SODIUM 40 MG: 40 TABLET, DELAYED RELEASE ORAL at 09:11

## 2018-11-07 RX ADMIN — IPRATROPIUM BROMIDE AND ALBUTEROL SULFATE 3 ML: .5; 3 SOLUTION RESPIRATORY (INHALATION) at 07:11

## 2018-11-07 RX ADMIN — FUROSEMIDE 20 MG: 20 TABLET ORAL at 08:11

## 2018-11-07 RX ADMIN — IOHEXOL 75 ML: 350 INJECTION, SOLUTION INTRAVENOUS at 11:11

## 2018-11-07 RX ADMIN — IOHEXOL 30 ML: 350 INJECTION, SOLUTION INTRAVENOUS at 11:11

## 2018-11-07 RX ADMIN — POLYETHYLENE GLYCOL 3350 17 G: 17 POWDER, FOR SOLUTION ORAL at 08:11

## 2018-11-07 RX ADMIN — AMITRIPTYLINE HYDROCHLORIDE 25 MG: 25 TABLET, FILM COATED ORAL at 08:11

## 2018-11-07 RX ADMIN — CEFTRIAXONE 1 G: 1 INJECTION, SOLUTION INTRAVENOUS at 06:11

## 2018-11-07 RX ADMIN — ESCITALOPRAM 20 MG: 10 TABLET, FILM COATED ORAL at 09:11

## 2018-11-07 RX ADMIN — IPRATROPIUM BROMIDE AND ALBUTEROL SULFATE 3 ML: .5; 3 SOLUTION RESPIRATORY (INHALATION) at 03:11

## 2018-11-07 RX ADMIN — IPRATROPIUM BROMIDE AND ALBUTEROL SULFATE 3 ML: .5; 3 SOLUTION RESPIRATORY (INHALATION) at 11:11

## 2018-11-07 RX ADMIN — LEVOTHYROXINE SODIUM 50 MCG: 50 TABLET ORAL at 06:11

## 2018-11-07 RX ADMIN — IPRATROPIUM BROMIDE AND ALBUTEROL SULFATE 3 ML: .5; 3 SOLUTION RESPIRATORY (INHALATION) at 12:11

## 2018-11-07 RX ADMIN — SODIUM CHLORIDE, SODIUM LACTATE, POTASSIUM CHLORIDE, AND CALCIUM CHLORIDE: .6; .31; .03; .02 INJECTION, SOLUTION INTRAVENOUS at 05:11

## 2018-11-07 NOTE — PROGRESS NOTES
Progress Note  Cardiothoracic Surgery    Admit Date: 10/26/2018  Post-operative Day: 2 Days Post-Op  Hospital Day: 13    SUBJECTIVE:Comfortable, no specific complaint.  Just returned from CT     Follow-up For: Procedure(s) (LRB):  VATS, WITH PLEURA BIOPSY (Left)  BIOPSY, LUNG, THORACOSCOPIC (Left)  VATS, WITH PLEURODESIS (Left)    Scheduled Meds:   albuterol-ipratropium  3 mL Nebulization Q8H    amitriptyline  25 mg Oral QHS    enoxaparin  40 mg Subcutaneous Daily    escitalopram oxalate  20 mg Oral Daily    furosemide  20 mg Oral Daily    levothyroxine  50 mcg Oral Before breakfast    metoprolol tartrate  50 mg Oral BID    nicotine  1 patch Transdermal Daily    pantoprazole  40 mg Oral Daily    polyethylene glycol  17 g Oral BID     Infusions/Drips:   lactated ringers 100 mL/hr at 11/07/18 0608     PRN Meds: acetaminophen, dextrose 50%, dextrose 50%, glucagon (human recombinant), glucose, glucose, LORazepam, ondansetron, oxyCODONE-acetaminophen, sodium chloride 0.9%    Review of patient's allergies indicates:   Allergen Reactions    Codeine Hives    Penicillins Hives       OBJECTIVE:     Vital Signs (Most Recent)  Temp: 98.1 °F (36.7 °C) (11/07/18 1200)  Pulse: 104 (11/07/18 1500)  Resp: 20 (11/07/18 1500)  BP: (!) 103/51 (11/07/18 1500)  SpO2: (!) 94 % (11/07/18 1500)    Admission Weight: 54.4 kg (120 lb) (10/26/18 2105)   Most Recent Weight: 50.2 kg (110 lb 10.7 oz) (11/06/18 0600)    Vital Signs Range (Last 24H):  Temp:  [97.8 °F (36.6 °C)-98.6 °F (37 °C)]   Pulse:  []   Resp:  [10-26]   BP: ()/(43-59)   SpO2:  [92 %-100 %]     I & O (Last 24H):    Intake/Output Summary (Last 24 hours) at 11/7/2018 1525  Last data filed at 11/7/2018 1400  Gross per 24 hour   Intake 4921 ml   Output 3215 ml   Net 1706 ml     Physical Exam:  NAD  BS decreased on left, otherwise coarse/clear    Wound/Incision:  Clean and dry    Laboratory:  CBC:   Recent Labs   Lab 11/07/18  0316   WBC 13.20*   RBC 3.63*    HGB 10.9*   HCT 32.7*      MCV 90   MCH 30.0   MCHC 33.3     BMP:   Recent Labs   Lab 11/07/18  0316   GLU 86   *   K 4.1   CL 95   CO2 32*   BUN 10   CREATININE 0.7   CALCIUM 8.6*   MG 1.7     Microbiology Results (last 7 days)     Procedure Component Value Units Date/Time    Culture, Body Fluid (Aerobic) w/ GS [[id number]] Collected:  10/29/18 1815    Order Status:  Completed Specimen:  Body Fluid from Pleural Fluid Updated:  11/03/18 1039     AEROBIC CULTURE - FLUID No growth     Gram Stain Result Cytospin indicates:      Many WBC's      No organisms seen    Culture, Respiratory with Gram Stain [[id number]]  (Susceptibility) Collected:  10/29/18 1401    Order Status:  Completed Specimen:  Respiratory from Sputum, Expectorated Updated:  11/01/18 1313     Respiratory Culture --     CANDIDA ALBICANS  Moderate       Respiratory Culture --     STENOTROPHOMONAS (X.) MALTOPHILIA  Rare       Gram Stain (Respiratory) <10 epithelial cells per low power field.     Gram Stain (Respiratory) Few WBC's     Gram Stain (Respiratory) Moderate yeast     Gram Stain (Respiratory) Rare Gram positive rods    Blood culture x two cultures. Draw prior to antibiotics. [[id number]] Collected:  10/26/18 2348    Order Status:  Completed Specimen:  Blood from Peripheral, Left  Arm Updated:  11/01/18 1212     Blood Culture, Routine No growth after 5 days.    Narrative:       Aerobic and anaerobic    Blood culture x two cultures. Draw prior to antibiotics. [[id number]] Collected:  10/26/18 2348    Order Status:  Completed Specimen:  Blood from Peripheral, Right  Wrist Updated:  11/01/18 1212     Blood Culture, Routine No growth after 5 days.    Narrative:       Aerobic and anaerobic        Specimen (12h ago, onward)    None              ASSESSMENT/PLAN:     Assessment: uncomplicated post-operative course.    Plan: Continue present management            Will review CT             Path pending

## 2018-11-07 NOTE — PROGRESS NOTES
Progress Note  Hospital Medicine  Patient Name:Bertha Kelly  MRN:  [id number]  Patient Class: IP- Inpatient  Admit Date: 10/26/2018  Length of Stay: 11 days  Expected Discharge Date:   Attending Physician: Vicki Suarez MD  Primary Care Provider:  James Colon MD    SUBJECTIVE:     Principal Problem: Pleural effusion  Initial history of present illness: Bertha Kelly is a 67 y/o female with a PMHx of HTN and thyroid disease who presented to the ED today with c/o SOB which began 3 weeks ago and has progressively gotten worse.  Pt states that she becomes SOB with activity.  She reports that she received a pneumonia vaccine at her doctor's office about a month ago and began having some chest congestion and cough a few days later.  She is a current smoker of about 1/2 pack daily.  Reports increased fatigue and chills, but denies fever, chest pain, N/V, and dysuria.  Reports weight loss of a few pounds in the past 3 weeks due to poor appetite, but states that prior to this illness, weight was stable and she had a very good appetite.  Pt's CXR reveals a LLL pneumonia and was noted to be mildly hypoxic initially requiring supplemental oxygen.  She will be admitted to the service of hospital medicine for further treatment.    PMH/PSH/SH/FH/Meds: reviewed.    Symptoms/Review of Systems: In ICU s/p VATS, left pleural biopsy, lung biopsy and left pleurodesis. On 2 L/min via NC. Looking and feeling better.  Diet: Cardiac  Activity level: Up with assistance  Pain:  Patient reports no pain.       OBJECTIVE:   Vital Signs (Most Recent):      Temp: 98.5 °F (36.9 °C) (11/07/18 0330)  Pulse: 85 (11/07/18 0733)  Resp: 15 (11/07/18 0733)  BP: (!) 106/49 (11/07/18 0600)  SpO2: (!) 92 % (11/07/18 0733)       Vital Signs Range (Last 24H):  Temp:  [97.8 °F (36.6 °C)-98.6 °F (37 °C)]   Pulse:  [66-99]   Resp:  [10-28]   BP: ()/(43-62)   SpO2:  [91 %-100 %]     Weight: 50.2 kg (110 lb 10.7 oz)  Body mass index is 19.6  kg/m².    Intake/Output Summary (Last 24 hours) at 11/7/2018 0905  Last data filed at 11/7/2018 0608  Gross per 24 hour   Intake 3043.33 ml   Output 2390 ml   Net 653.33 ml     Physical Examination:  Constitutional: She is oriented to person, place, and time. She appears well-developed and well-nourished. No distress.   HENT:   Head: Normocephalic and atraumatic.   Eyes: Pupils are equal, round, and reactive to light.   Neck: Neck supple. No thyromegaly present.   Cardiovascular: Normal rate and regular rhythm. Exam reveals no gallop and no friction rub.   No murmur heard.  Pulmonary/Chest: No respiratory distress. She has wheezes. Left chest tube is in place  Abdominal: Soft. Bowel sounds are normal. She exhibits no distension. There is no tenderness. There is no guarding.   Musculoskeletal: Normal range of motion. She exhibits no edema.   Neurological: She is alert and oriented to person, place, and time.   Skin: Skin is warm and dry. No erythema.   Psychiatric: She has a normal mood and affect.     CBC:  Recent Labs   Lab 11/05/18 0415 11/06/18 0311 11/07/18 0316   WBC 8.60 19.10* 13.20*   RBC 4.46 4.11 3.63*   HGB 13.4 12.1 10.9*   HCT 40.0 36.6* 32.7*    308 275   MCV 90 89 90   MCH 30.0 29.5 30.0   MCHC 33.4 33.1 33.3   BMP  Recent Labs   Lab 11/05/18 0415 11/06/18 0311 11/07/18 0316   * 153* 86    131* 134*   K 3.8 4.4 4.1   CL 91* 93* 95   CO2 34* 30* 32*   BUN 11 11 10   CREATININE 0.8 0.7 0.7   CALCIUM 9.5 9.1 8.6*   MG 2.0 1.8 1.7      Diagnostic Results:  Microbiology Results (last 7 days)     Procedure Component Value Units Date/Time    Culture, Body Fluid (Aerobic) w/ GS [[id number]] Collected:  10/29/18 1815    Order Status:  Completed Specimen:  Body Fluid from Pleural Fluid Updated:  11/03/18 1039     AEROBIC CULTURE - FLUID No growth     Gram Stain Result Cytospin indicates:      Many WBC's      No organisms seen    Culture, Respiratory with Gram Stain [[id number]]   (Susceptibility) Collected:  10/29/18 1401    Order Status:  Completed Specimen:  Respiratory from Sputum, Expectorated Updated:  11/01/18 1313     Respiratory Culture --     CANDIDA ALBICANS  Moderate       Respiratory Culture --     STENOTROPHOMONAS (X.) MALTOPHILIA  Rare       Gram Stain (Respiratory) <10 epithelial cells per low power field.     Gram Stain (Respiratory) Few WBC's     Gram Stain (Respiratory) Moderate yeast     Gram Stain (Respiratory) Rare Gram positive rods    Blood culture x two cultures. Draw prior to antibiotics. [[id number]] Collected:  10/26/18 2348    Order Status:  Completed Specimen:  Blood from Peripheral, Left  Arm Updated:  11/01/18 1212     Blood Culture, Routine No growth after 5 days.    Narrative:       Aerobic and anaerobic    Blood culture x two cultures. Draw prior to antibiotics. [[id number]] Collected:  10/26/18 2348    Order Status:  Completed Specimen:  Blood from Peripheral, Right  Wrist Updated:  11/01/18 1212     Blood Culture, Routine No growth after 5 days.    Narrative:       Aerobic and anaerobic         CXR: The cardiomediastinal silhouette is normal in appearance.  No pulmonary vascular congestion appreciated. There is dense airspace consolidative change present at the left lung base and there is patchy segmental airspace opacity in the left mid and upper lung zones.  Differential considerations include pneumonia and aspiration.  Underlying pleural fluid cannot be excluded.  An underlying mass would be difficult to exclude.  Serial radiography will be necessary to ensure resolution and exclude an underlying lesion.  There is an age-indeterminate, possibly remote, right posterior 10th rib fracture.    CXR:  Unchanged small left apical pneumothorax. Unchanged moderate left and small right pleural effusions and multifocal airspace disease.    CT chest:  Small left pneumothorax status post preceding left thoracentesis.  Less than 10%.  Moderate left and small right  pleural effusions and moderate left lung and mild right lower lobe consolidation/atelectasis.  Enlarged prevascular mediastinal lymph node.  Additional nodular opacities at the cardiac apex which may represent additional prominent lymph nodes, or possibly pleural based nodules.  Moderate emphysema.  A distinct lung mass is not visible however follow-up to resolution of the pulmonary consolidation is recommended to evaluate for on the underlying lung mass, considering the enlarged mediastinal lymph node.  Further evaluation with CT chest with contrast may also be useful if the patient can receive IV contrast. 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    CXR: Unchanged small left apical pneumothorax. Unchanged moderate left and small right pleural effusions and multifocal airspace disease.    CXR: Continued dense infiltrate in the left mid and lower lung field with atelectasis and moderate to large left pleural effusion which obscures the heart size.  Small infiltrate at the right lung base and small right pleural effusion as well.    ECHO:  · Left ventricle ejection fraction is at 60%  · No wall motion abnormalities.  · Grade I (mild) left ventricular diastolic dysfunction consistent with impaired relaxation.  · LA pressure is normal.  · Mitral valve is mildly sclerotic  · There is a moderate left pleural effusion.  · Technically very difficult study    Assessment/Plan:     * Acute respiratory failure with hypoxia  Large left para-pneumonic effusion s/p thoracentesis  Lung Adenocarcinoma s/p VATS, left pleural biopsy, lung biopsy and left pleurodesis     Close monitoring in ICU. Follow pulmonary and CTS recommendations.   Continue supplemental oxygen as needed.  Nebulizer treatments   Monitor and treat as clinically indicated.  Follow CT chest abdomen and pelvis  results. MRI brain tomorrow. Dr. Knox is following.       Tobacco abuse     Smoking cessation counseling performed. Dangers of cigarette smoking were reviewed with patient in detail and patient was encouraged to quit.      Generalized anxiety disorder     Chronic; currently controlled.  Continue home regimen.      Essential hypertension     Chronic; stable.  Continue chronic meds.      Hypothyroidism due to Hashimoto's thyroiditis     Chronic; pt states controlled.  Continue levothyroxine.      Pneumonia of left lower lobe due to infectious organism     Presents with SOB; LLL pneumonia identified on CXR.    Initiate treatment regimen for CAP, which includes a beta lactam and macrolide.   IV Rocephin and IV azithromycin.  Nebulizer treatments     Worsening Liver enzymes  DC IV Rocephin. Trend LFTs.    Discussed with patient's sister.    VTE Risk Mitigation (From admission, onward)        Ordered     IP VTE HIGH RISK PATIENT  Once . Start Lovenox 40 mg SQ q day once okay with Dr. Jones.    10/27/18 0207     Place EVELYNE hose  Until discontinued      10/27/18 0207     Place sequential compression device  Until discontinued      10/27/18 0207        Vicki Suarez MD  Department of Hospital Medicine   [facility]

## 2018-11-07 NOTE — PLAN OF CARE
Problem: Patient Care Overview  Goal: Plan of Care Review  Outcome: Ongoing (interventions implemented as appropriate)  VSS. Pt on 3L NC with O2 sats in upper 90s. Chest tube had 25 mL of drainage the entire shift. UO good. Safety maintained entire shift

## 2018-11-07 NOTE — PLAN OF CARE
11/07/18 0028   Patient Assessment/Suction   Level of Consciousness (AVPU) alert   Respiratory Effort Unlabored   All Lung Fields Breath Sounds clear;diminished   PRE-TX-O2-ETCO2   O2 Device (Oxygen Therapy) nasal cannula   $ Is the patient on Low Flow Oxygen? Yes   Flow (L/min) 3   SpO2 98 %   Pulse Oximetry Type Continuous   $ Pulse Oximetry - Multiple Charge Pulse Oximetry - Multiple   Pulse 71   Resp 12   Aerosol Therapy   $ Aerosol Therapy Charges Aerosol Treatment   Respiratory Treatment Status given   SVN/Inhaler Treatment Route with air;mask   Position During Treatment HOB at 45 degrees   Patient Tolerance fair   Post-Treatment   Post-treatment Heart Rate (beats/min) 36   Post-treatment Resp Rate (breaths/min) 12   All Fields Breath Sounds unchanged   Incentive Spirometer   $ Incentive Spirometer Charges done with encouragement   Administration (Incentive Spirometer) done with encouragement   Number of Repetitions (Incentive Spirometer) 8   Level (mL) (Incentive Spirometer) 500   Patient Tolerance poor   Vibratory PEP Therapy   $ Vibratory PEP Charges Aerobika Therapy   $ Vibratory PEP Tech Time Charges 15 min   Type Oscillating PEP Therapy   Administration done with encouragement   Number of Repetitions 8   Comments fair

## 2018-11-07 NOTE — CONSULTS
[facility]  Hematology/Oncology  Consult Note    Patient Name: Bertha Kelly  MRN: [id number]  Admission Date: 10/26/2018  Hospital Length of Stay: 11 days  Code Status: Full Code   Attending Provider: Vicki Suarez MD  Consulting Provider: Marsha Knox MD  Primary Care Physician: James Colon MD  Principal Problem:Pleural effusion  November 7   Consults  Subjective:     HPI:   Patient is a 68 y.o. female who presented  with increasing SOB over the past 3 weeks, cough and fatigue. She was started on antimicrobials for LLL pneumonia.  She has undergone a left sided thoracentesis and cytology revealed adenocarcinoma. CT chest has been performed revealing Bilateral lower lobe consolidation and mediastinal adenopathy suspected, no distinct lung mass could be detected. VATS performed November 5 for further diagnostic and therapeutic intervention. Pt has lost a couple of pounds over the past few months. SHe has smoked a 1/2 pack of cigarettes daily for over a decade . Today she is oriented and remarks that she is breathing easier after admission. SHe has no fever or night sweats.           Medications:  Continuous Infusions:   lactated ringers 100 mL/hr at 11/07/18 0547     Scheduled Meds:   albuterol-ipratropium  3 mL Nebulization Q8H    amitriptyline  25 mg Oral QHS    cefTRIAXone 1 g in dextrose 5 % 50 mL  1 g Intravenous Q24H    escitalopram oxalate  20 mg Oral Daily    furosemide  20 mg Oral Daily    levothyroxine  50 mcg Oral Before breakfast    metoprolol tartrate  50 mg Oral BID    nicotine  1 patch Transdermal Daily    pantoprazole  40 mg Oral Daily    polyethylene glycol  17 g Oral BID     PRN Meds:acetaminophen, dextrose 50%, dextrose 50%, glucagon (human recombinant), glucose, glucose, LORazepam, ondansetron, oxyCODONE-acetaminophen, sodium chloride 0.9%     Review of patient's allergies indicates:   Allergen Reactions    Codeine Hives    Penicillins Hives        Past  Medical History:   Diagnosis Date    Hypertension     Thyroid disease      Past Surgical History:   Procedure Laterality Date    BIOPSY, LUNG, THORACOSCOPIC Left 11/5/2018    Performed by Mp Jones MD at [facility] OR    EYE SURGERY      childhood    PLEURODESIS WITH VIDEO-ASSISTED THORACOSCOPIC SURGERY (VATS) Left 11/5/2018    Procedure: VATS, WITH PLEURODESIS;  Surgeon: Mp Jones MD;  Location: [facility] OR;  Service: Cardiothoracic;  Laterality: Left;    THORACOSCOPIC BIOPSY OF LUNG Left 11/5/2018    Procedure: BIOPSY, LUNG, THORACOSCOPIC;  Surgeon: Mp Jones MD;  Location: [facility] OR;  Service: Cardiothoracic;  Laterality: Left;    THORACOSCOPIC BIOPSY OF PLEURA Left 11/5/2018    Procedure: VATS, WITH PLEURA BIOPSY;  Surgeon: Mp Jones MD;  Location: [facility] OR;  Service: Cardiothoracic;  Laterality: Left;    VATS, WITH PLEURA BIOPSY Left 11/5/2018    Performed by Mp Jones MD at [facility] OR    VATS, WITH PLEURODESIS Left 11/5/2018    Performed by Mp Jones MD at [facility] OR     Family History     Problem Relation (Age of Onset)    Hypertension Father        Tobacco Use    Smoking status: Current Every Day Smoker     Packs/day: 0.50     Types: Cigarettes    Smokeless tobacco: Never Used   Substance and Sexual Activity    Alcohol use: No     Frequency: Never    Drug use: No    Sexual activity: Not on file       Review of Systems   Constitutional: Positive for activity change and fatigue. Negative for diaphoresis and fever.   HENT: Negative for congestion and facial swelling.    Eyes: Negative for discharge and itching.   Respiratory: Positive for shortness of breath. Negative for cough, chest tightness and wheezing.    Cardiovascular: Negative for chest pain and leg swelling.   Gastrointestinal: Negative for abdominal distention, abdominal pain, constipation, diarrhea and nausea.   Endocrine: Negative for cold intolerance and heat intolerance.   Musculoskeletal: Negative for  arthralgias and back pain.   Skin: Negative for color change and pallor.   Allergic/Immunologic: Negative for environmental allergies and food allergies.   Neurological: Positive for weakness. Negative for dizziness, tremors and light-headedness.   Hematological: Negative for adenopathy. Does not bruise/bleed easily.   Psychiatric/Behavioral: Negative for agitation and behavioral problems.   All other systems reviewed and are negative.    Objective:     Vital Signs (Most Recent):  Temp: 98.5 °F (36.9 °C) (11/07/18 0330)  Pulse: 76 (11/07/18 0530)  Resp: 16 (11/07/18 0530)  BP: (!) 97/46 (11/07/18 0500)  SpO2: 97 % (11/07/18 0530) Vital Signs (24h Range):  Temp:  [97.7 °F (36.5 °C)-98.6 °F (37 °C)] 98.5 °F (36.9 °C)  Pulse:  [66-99] 76  Resp:  [10-28] 16  SpO2:  [91 %-100 %] 97 %  BP: ()/(43-62) 97/46     Weight: 50.2 kg (110 lb 10.7 oz)  Body mass index is 19.6 kg/m².  Body surface area is 1.49 meters squared.      Intake/Output Summary (Last 24 hours) at 11/7/2018 0552  Last data filed at 11/7/2018 0330  Gross per 24 hour   Intake 2810 ml   Output 2530 ml   Net 280 ml       Physical Exam   Constitutional: She is oriented to person, place, and time. No distress.   HENT:   Head: Normocephalic and atraumatic.   Right Ear: External ear normal.   Left Ear: External ear normal.   Mouth/Throat: Oropharynx is clear and moist. No oropharyngeal exudate.   Eyes: Conjunctivae and EOM are normal. Right eye exhibits no discharge. Left eye exhibits no discharge.   Neck: Normal range of motion. Neck supple. No JVD present. No tracheal deviation present. No thyromegaly present.   Cardiovascular: Normal rate, regular rhythm and normal heart sounds.   No murmur heard.  Pulmonary/Chest: Effort normal. No respiratory distress. She has no wheezes. She exhibits no tenderness.   Abdominal: Soft. Bowel sounds are normal. She exhibits no distension. There is no tenderness.   Musculoskeletal: Normal range of motion. She exhibits no  edema or tenderness.   Neurological: She is alert and oriented to person, place, and time.   Skin: Skin is dry. She is not diaphoretic. No erythema. No pallor.   Psychiatric: She has a normal mood and affect.   Nursing note and vitals reviewed.      Significant Labs:     Lab Results   Component Value Date    WBC 13.20 (H) 11/07/2018    RBC 3.63 (L) 11/07/2018    HGB 10.9 (L) 11/07/2018    HCT 32.7 (L) 11/07/2018    MCV 90 11/07/2018    MCH 30.0 11/07/2018    MCHC 33.3 11/07/2018    RDW 13.8 11/07/2018     11/07/2018    MPV 9.3 11/07/2018    GRAN 9.3 (H) 11/07/2018    GRAN 70.4 11/07/2018    LYMPH 1.6 11/07/2018    LYMPH 12.5 (L) 11/07/2018    MONO 2.1 (H) 11/07/2018    MONO 16.1 (H) 11/07/2018    EOS 0.1 11/07/2018    BASO 0.10 11/07/2018    EOSINOPHIL 0.5 11/07/2018    BASOPHIL 0.5 11/07/2018     CMP  Sodium   Date Value Ref Range Status   11/07/2018 134 (L) 136 - 145 mmol/L Final     Potassium   Date Value Ref Range Status   11/07/2018 4.1 3.5 - 5.1 mmol/L Final     Chloride   Date Value Ref Range Status   11/07/2018 95 95 - 110 mmol/L Final     CO2   Date Value Ref Range Status   11/07/2018 32 (H) 23 - 29 mmol/L Final     Glucose   Date Value Ref Range Status   11/07/2018 86 70 - 110 mg/dL Final     BUN, Bld   Date Value Ref Range Status   11/07/2018 10 8 - 23 mg/dL Final     Creatinine   Date Value Ref Range Status   11/07/2018 0.7 0.5 - 1.4 mg/dL Final     Calcium   Date Value Ref Range Status   11/07/2018 8.6 (L) 8.7 - 10.5 mg/dL Final     Total Protein   Date Value Ref Range Status   11/07/2018 4.9 (L) 6.0 - 8.4 g/dL Final     Albumin   Date Value Ref Range Status   11/07/2018 1.8 (L) 3.5 - 5.2 g/dL Final     Total Bilirubin   Date Value Ref Range Status   11/07/2018 0.6 0.1 - 1.0 mg/dL Final     Comment:     For infants and newborns, interpretation of results should be based  on gestational age, weight and in agreement with clinical  observations.  Premature Infant recommended reference ranges:  Up to  24 hours.............<8.0 mg/dL  Up to 48 hours............<12.0 mg/dL  3-5 days..................<15.0 mg/dL  6-29 days.................<15.0 mg/dL       Alkaline Phosphatase   Date Value Ref Range Status   11/07/2018 266 (H) 55 - 135 U/L Final     AST   Date Value Ref Range Status   11/07/2018 449 (H) 10 - 40 U/L Final     ALT   Date Value Ref Range Status   11/07/2018 363 (H) 10 - 44 U/L Final     Anion Gap   Date Value Ref Range Status   11/07/2018 7 (L) 8 - 16 mmol/L Final     eGFR if    Date Value Ref Range Status   11/07/2018 >60 >60 mL/min/1.73 m^2 Final     eGFR if non    Date Value Ref Range Status   11/07/2018 >60 >60 mL/min/1.73 m^2 Final     Comment:     Calculation used to obtain the estimated glomerular filtration  rate (eGFR) is the CKD-EPI equation.            Diagnostic Results:  Noted and reviewed    Assessment/Plan:     Active Diagnoses:    Diagnosis Date Noted POA    PRINCIPAL PROBLEM:  Pleural effusion [J90] 10/29/2018 Yes    Moderate malnutrition [E44.0] 11/05/2018 Yes    Wheeze [R06.2] 10/29/2018 Yes    Anorexia [R63.0] 10/29/2018 Yes    SOB (shortness of breath) [R06.02] 10/29/2018 Yes    Pneumonia of left lower lobe due to infectious organism [J18.1] 10/27/2018 Yes    Hypothyroidism due to Hashimoto's thyroiditis [E03.8, E06.3] 10/27/2018 Yes    Essential hypertension [I10] 10/27/2018 Yes    Generalized anxiety disorder [F41.1] 10/27/2018 Yes    Acute respiratory failure with hypoxia [J96.01] 10/27/2018 Yes    Tobacco abuse [Z72.0] 10/27/2018 Yes      Problems Resolved During this Admission:     1. Malignant pleural effusion: adenocarcinoma; awaiting path post VATS  2. Anemia stable not requiring intervention at this time  3. Monocytosis  4. Hypothyroidism: tolerating Synthroid  5. Depression tolerating Escitalopram  6. Pneumonia tolerating Rocephin    Pt with slight slurring of speech. Nurse reports that she has a history of slight confusion yet  family had reported increasing confusion. Evaluate further with CT abdomen and consider MRI of head   CT chest to be repeated possibly with contrast for better evaluation after completing antibiotics  Followed by DR Colon as outpatient      Thank you for your consult.     Marsha Knox MD  Hematology/Oncology  [facility]

## 2018-11-07 NOTE — PLAN OF CARE
Problem: Patient Care Overview  Goal: Plan of Care Review  Outcome: Ongoing (interventions implemented as appropriate)  Plan of care reviewed with patient and family through out the day. CTA of chest abdomen and pelvis complete. Family at bedside through out day. VSS. No acute changes. Will continue to monitor patient.

## 2018-11-07 NOTE — PROGRESS NOTES
Progress Note  PULMONARY    Admit Date: 10/26/2018   11/07/2018      SUBJECTIVE:     Oct 30, breathing better, some appetite.  Oct 31, no c/o, tried pull out lines last pm, denies sob.  Nov 1, no c/o, says breathing and eating ok?  Nov 5, post pleurodesis. Confused.  No c/o  Nov 6, less confused.  No c/o  Nov 7, pleasant. No c/o.    PFSH and Allergies reviewed.    OBJECTIVE:     Vitals (Most recent):  Vitals:    11/07/18 1100   BP: (!) 104/54   Pulse: 87   Resp: (!) 24   Temp:        Vitals (24 hour range):  Temp:  [97.8 °F (36.6 °C)-98.6 °F (37 °C)]   Pulse:  [66-91]   Resp:  [10-28]   BP: ()/(43-62)   SpO2:  [91 %-100 %]       Intake/Output Summary (Last 24 hours) at 11/7/2018 1230  Last data filed at 11/7/2018 0800  Gross per 24 hour   Intake 3463.33 ml   Output 2010 ml   Net 1453.33 ml          Physical Exam:  The patient's neuro status (alertness,orientation,cognitive function,motor skills,), pharyngeal exam (oral lesions, hygiene, abn dentition,), Neck (jvd,mass,thyroid,nodes in neck and above/below clavicle),RESPIRATORY(symmetry,effort,fremitus,percussion,auscultation),  Cor(rhythm,heart tones including gallops,perfusion,edema)ABD(distention,hepatic&splenomegaly,tenderness,masses), Skin(rash,cyanosis),Psyc(affect,judgement,).  Exam negative except for these pertinent findings:    Alert, confuse, left chest tube    Radiographs reviewed: view by direct vision  Ct bilat effusion, dense left lower lung.  cxr left chest tube  Looks good - 11/6    Results for orders placed during the hospital encounter of 10/26/18   X-Ray Chest 1 View    Narrative EXAMINATION:  XR CHEST 1 VIEW    CLINICAL HISTORY:  post thoracentesis;    TECHNIQUE:  Single frontal view of the chest was performed.    COMPARISON:  Chest radiograph 10/29/2018; CT dated 10/30/2018    FINDINGS:  Bibasilar airspace disease.  Normal size heart. Indistinct pulmonary vasculature. Blunted costophrenic angles.  Left apical pneumothorax.      Impression  1. Post thoracentesis with reduction in size of left pleural effusion and development of apical pneumothorax.  This is more conspicuous on subsequent CT.  2. Small right pleural effusion.  3. Bibasilar airspace disease which could reflect atelectasis, pneumonia, edema.      Electronically signed by: Demar Ann  Date:    10/30/2018  Time:    10:33   ]    Labs     Recent Labs   Lab 11/07/18  0316   WBC 13.20*   HGB 10.9*   HCT 32.7*      Electronically reviewed and signed by:   Ursula Chow MD   Signed on 10/30/18 at 13:20   Pathologist review of pleural fluid cell count:   The cytospin shows a predominance of mature appearing lymphocytes   with rare neutrophils and eosinophils seen in the background as well   as an occasional atypical mesothelial cell, favor reactive.     Correlate clinically.  Recent Labs   Lab 11/07/18 0316   *   K 4.1   CL 95   CO2 32*   BUN 10   CREATININE 0.7   GLU 86   CALCIUM 8.6*   MG 1.7   PHOS 3.3   *   *   ALKPHOS 266*   BILITOT 0.6   PROT 4.9*   ALBUMIN 1.8*   No results for input(s): PH, PCO2, PO2, HCO3 in the last 24 hours.  Microbiology Results (last 7 days)     Procedure Component Value Units Date/Time    Culture, Body Fluid (Aerobic) w/ GS [[id number]] Collected:  10/29/18 1815    Order Status:  Completed Specimen:  Body Fluid from Pleural Fluid Updated:  11/03/18 1039     AEROBIC CULTURE - FLUID No growth     Gram Stain Result Cytospin indicates:      Many WBC's      No organisms seen    Culture, Respiratory with Gram Stain [[id number]]  (Susceptibility) Collected:  10/29/18 1401    Order Status:  Completed Specimen:  Respiratory from Sputum, Expectorated Updated:  11/01/18 1313     Respiratory Culture --     CANDIDA ALBICANS  Moderate       Respiratory Culture --     STENOTROPHOMONAS (X.) MALTOPHILIA  Rare       Gram Stain (Respiratory) <10 epithelial cells per low power field.     Gram Stain (Respiratory) Few WBC's     Gram Stain (Respiratory)  Moderate yeast     Gram Stain (Respiratory) Rare Gram positive rods    Blood culture x two cultures. Draw prior to antibiotics. [[id number]] Collected:  10/26/18 2348    Order Status:  Completed Specimen:  Blood from Peripheral, Left  Arm Updated:  11/01/18 1212     Blood Culture, Routine No growth after 5 days.    Narrative:       Aerobic and anaerobic    Blood culture x two cultures. Draw prior to antibiotics. [[id number]] Collected:  10/26/18 2348    Order Status:  Completed Specimen:  Blood from Peripheral, Right  Wrist Updated:  11/01/18 1212     Blood Culture, Routine No growth after 5 days.    Narrative:       Aerobic and anaerobic          Impression:  Active Hospital Problems    Diagnosis  POA    *Pleural effusion [J90]  Yes    Moderate malnutrition [E44.0]  Yes    Wheeze [R06.2]  Yes    Anorexia [R63.0]  Yes    SOB (shortness of breath) [R06.02]  Yes    Pneumonia of left lower lobe due to infectious organism [J18.1]  Yes    Hypothyroidism due to Hashimoto's thyroiditis [E03.8, E06.3]  Yes    Essential hypertension [I10]  Yes    Generalized anxiety disorder [F41.1]  Yes    Acute respiratory failure with hypoxia [J96.01]  Yes    Tobacco abuse [Z72.0]  Yes      Resolved Hospital Problems   No resolved problems to display.               Plan:     Oct 30, fluid was lymph predominate exudate.  Cancer concerning.  F/u cxr am - if fluid building up would recommend thoracoscopic procedure with Dr Jones.  Cytology pending.  procalcitonin was very low.  Check d dimer and bnp am.  Discussed with pt and family.  Oct 31, bnp up, cytospin cells not felt malignant - cytology pending.  D dimer good.  cxr left effusion increasing. Dose lasix, check echo.  May need Dr Jones for procedure?  F/u cxr am to decide.    Nov 1, echo na, cxr worsening - fluid rapidly returning.  S. Maltophilia in sputum but procalcitonin was low.  Cytology na.  Discussed with Dr Suarez.      Pt maybe a little better but is non  ambulatory and frail ( too frail to do therapy).  Will dose lasix 20, ask Dr Jones to see.  Pt wanting to go home????    lft sl up and co2 rising.    Nov 5, cytology with adenoca c/w lung. Post pleurodysis now.     Consider oncology consult to facilitate disposition.  Pt not thriving.    Nov 6, min confused, looks to be progressing, needs mobilize.      Nov 7, lft up post op with ast 449, otherwise same.  Looks frail.                    .

## 2018-11-08 ENCOUNTER — ANESTHESIA (OUTPATIENT)
Dept: ENDOSCOPY | Facility: HOSPITAL | Age: 68
DRG: 166 | End: 2018-11-08
Payer: COMMERCIAL

## 2018-11-08 ENCOUNTER — ANESTHESIA EVENT (OUTPATIENT)
Dept: ENDOSCOPY | Facility: HOSPITAL | Age: 68
DRG: 166 | End: 2018-11-08
Payer: COMMERCIAL

## 2018-11-08 PROBLEM — R93.89 ABNORMAL CXR: Status: ACTIVE | Noted: 2018-11-08

## 2018-11-08 PROBLEM — J98.11 ATELECTASIS: Status: ACTIVE | Noted: 2018-11-08

## 2018-11-08 LAB
ALBUMIN SERPL BCP-MCNC: 1.7 G/DL
ALP SERPL-CCNC: 412 U/L
ALT SERPL W/O P-5'-P-CCNC: 448 U/L
ANION GAP SERPL CALC-SCNC: 10 MMOL/L
AST SERPL-CCNC: 475 U/L
BASOPHILS # BLD AUTO: 0 K/UL
BASOPHILS NFR BLD: 0.1 %
BILIRUB SERPL-MCNC: 1.3 MG/DL
BUN SERPL-MCNC: 6 MG/DL
CALCIUM SERPL-MCNC: 8.7 MG/DL
CHLORIDE SERPL-SCNC: 92 MMOL/L
CO2 SERPL-SCNC: 32 MMOL/L
CREAT SERPL-MCNC: 0.6 MG/DL
DIFFERENTIAL METHOD: ABNORMAL
EOSINOPHIL # BLD AUTO: 0.1 K/UL
EOSINOPHIL NFR BLD: 1.1 %
ERYTHROCYTE [DISTWIDTH] IN BLOOD BY AUTOMATED COUNT: 13.5 %
EST. GFR  (AFRICAN AMERICAN): >60 ML/MIN/1.73 M^2
EST. GFR  (NON AFRICAN AMERICAN): >60 ML/MIN/1.73 M^2
GLUCOSE SERPL-MCNC: 87 MG/DL
HCT VFR BLD AUTO: 34.5 %
HGB BLD-MCNC: 11.4 G/DL
LYMPHOCYTES # BLD AUTO: 1.2 K/UL
LYMPHOCYTES NFR BLD: 12.5 %
MAGNESIUM SERPL-MCNC: 1.6 MG/DL
MCH RBC QN AUTO: 30 PG
MCHC RBC AUTO-ENTMCNC: 33.2 G/DL
MCV RBC AUTO: 90 FL
MONOCYTES # BLD AUTO: 1 K/UL
MONOCYTES NFR BLD: 10.7 %
NEUTROPHILS # BLD AUTO: 7 K/UL
NEUTROPHILS NFR BLD: 75.6 %
PHOSPHATE SERPL-MCNC: 3.3 MG/DL
PLATELET # BLD AUTO: 309 K/UL
PMV BLD AUTO: 8.3 FL
POTASSIUM SERPL-SCNC: 3.8 MMOL/L
PROT SERPL-MCNC: 5.1 G/DL
RBC # BLD AUTO: 3.81 M/UL
SODIUM SERPL-SCNC: 134 MMOL/L
WBC # BLD AUTO: 9.2 K/UL

## 2018-11-08 PROCEDURE — 25000003 PHARM REV CODE 250: Performed by: NURSE ANESTHETIST, CERTIFIED REGISTERED

## 2018-11-08 PROCEDURE — 87070 CULTURE OTHR SPECIMN AEROBIC: CPT

## 2018-11-08 PROCEDURE — 25000003 PHARM REV CODE 250: Performed by: INTERNAL MEDICINE

## 2018-11-08 PROCEDURE — 80053 COMPREHEN METABOLIC PANEL: CPT

## 2018-11-08 PROCEDURE — 99900035 HC TECH TIME PER 15 MIN (STAT)

## 2018-11-08 PROCEDURE — 20000000 HC ICU ROOM

## 2018-11-08 PROCEDURE — 94640 AIRWAY INHALATION TREATMENT: CPT

## 2018-11-08 PROCEDURE — 94799 UNLISTED PULMONARY SVC/PX: CPT

## 2018-11-08 PROCEDURE — 97803 MED NUTRITION INDIV SUBSEQ: CPT

## 2018-11-08 PROCEDURE — 99232 SBSQ HOSP IP/OBS MODERATE 35: CPT | Mod: 25,,, | Performed by: INTERNAL MEDICINE

## 2018-11-08 PROCEDURE — 94664 DEMO&/EVAL PT USE INHALER: CPT

## 2018-11-08 PROCEDURE — 85025 COMPLETE CBC W/AUTO DIFF WBC: CPT

## 2018-11-08 PROCEDURE — 87205 SMEAR GRAM STAIN: CPT

## 2018-11-08 PROCEDURE — 25000003 PHARM REV CODE 250: Performed by: NURSE PRACTITIONER

## 2018-11-08 PROCEDURE — 0B9L8ZX DRAINAGE OF LEFT LUNG, VIA NATURAL OR ARTIFICIAL OPENING ENDOSCOPIC, DIAGNOSTIC: ICD-10-PCS | Performed by: INTERNAL MEDICINE

## 2018-11-08 PROCEDURE — 37000008 HC ANESTHESIA 1ST 15 MINUTES: Performed by: INTERNAL MEDICINE

## 2018-11-08 PROCEDURE — 36415 COLL VENOUS BLD VENIPUNCTURE: CPT

## 2018-11-08 PROCEDURE — 63600175 PHARM REV CODE 636 W HCPCS: Performed by: INTERNAL MEDICINE

## 2018-11-08 PROCEDURE — 94761 N-INVAS EAR/PLS OXIMETRY MLT: CPT

## 2018-11-08 PROCEDURE — 25000003 PHARM REV CODE 250

## 2018-11-08 PROCEDURE — 27000221 HC OXYGEN, UP TO 24 HOURS

## 2018-11-08 PROCEDURE — 31622 DX BRONCHOSCOPE/WASH: CPT | Performed by: INTERNAL MEDICINE

## 2018-11-08 PROCEDURE — S4991 NICOTINE PATCH NONLEGEND: HCPCS | Performed by: NURSE PRACTITIONER

## 2018-11-08 PROCEDURE — 84100 ASSAY OF PHOSPHORUS: CPT

## 2018-11-08 PROCEDURE — 25000003 PHARM REV CODE 250: Performed by: ANESTHESIOLOGY

## 2018-11-08 PROCEDURE — 25000242 PHARM REV CODE 250 ALT 637 W/ HCPCS: Performed by: INTERNAL MEDICINE

## 2018-11-08 PROCEDURE — 99233 SBSQ HOSP IP/OBS HIGH 50: CPT | Mod: ,,, | Performed by: INTERNAL MEDICINE

## 2018-11-08 PROCEDURE — 83735 ASSAY OF MAGNESIUM: CPT

## 2018-11-08 PROCEDURE — D9220A PRA ANESTHESIA: Mod: ANES,,, | Performed by: ANESTHESIOLOGY

## 2018-11-08 PROCEDURE — 37000009 HC ANESTHESIA EA ADD 15 MINS: Performed by: INTERNAL MEDICINE

## 2018-11-08 PROCEDURE — 63600175 PHARM REV CODE 636 W HCPCS: Performed by: NURSE ANESTHETIST, CERTIFIED REGISTERED

## 2018-11-08 PROCEDURE — 31622 DX BRONCHOSCOPE/WASH: CPT | Mod: ,,, | Performed by: INTERNAL MEDICINE

## 2018-11-08 PROCEDURE — D9220A PRA ANESTHESIA: Mod: CRNA,,, | Performed by: NURSE ANESTHETIST, CERTIFIED REGISTERED

## 2018-11-08 PROCEDURE — 80074 ACUTE HEPATITIS PANEL: CPT

## 2018-11-08 RX ORDER — LIDOCAINE HYDROCHLORIDE 40 MG/ML
4 INJECTION, SOLUTION RETROBULBAR ONCE
Status: COMPLETED | OUTPATIENT
Start: 2018-11-08 | End: 2018-11-08

## 2018-11-08 RX ORDER — LIDOCAINE HCL/PF 100 MG/5ML
SYRINGE (ML) INTRAVENOUS
Status: DISCONTINUED | OUTPATIENT
Start: 2018-11-08 | End: 2018-11-08

## 2018-11-08 RX ORDER — LIDOCAINE HYDROCHLORIDE 10 MG/ML
INJECTION, SOLUTION EPIDURAL; INFILTRATION; INTRACAUDAL; PERINEURAL
Status: DISCONTINUED
Start: 2018-11-08 | End: 2018-11-14 | Stop reason: HOSPADM

## 2018-11-08 RX ORDER — OXYMETAZOLINE HCL 0.05 %
SPRAY, NON-AEROSOL (ML) NASAL
Status: DISCONTINUED
Start: 2018-11-08 | End: 2018-11-14 | Stop reason: HOSPADM

## 2018-11-08 RX ORDER — LIDOCAINE HYDROCHLORIDE 40 MG/ML
INJECTION, SOLUTION RETROBULBAR
Status: COMPLETED
Start: 2018-11-08 | End: 2018-11-08

## 2018-11-08 RX ORDER — PROPOFOL 10 MG/ML
VIAL (ML) INTRAVENOUS
Status: DISCONTINUED | OUTPATIENT
Start: 2018-11-08 | End: 2018-11-08

## 2018-11-08 RX ORDER — KETAMINE HYDROCHLORIDE 10 MG/ML
INJECTION, SOLUTION INTRAMUSCULAR; INTRAVENOUS
Status: DISCONTINUED | OUTPATIENT
Start: 2018-11-08 | End: 2018-11-08

## 2018-11-08 RX ADMIN — OXYCODONE HYDROCHLORIDE AND ACETAMINOPHEN 1 TABLET: 7.5; 325 TABLET ORAL at 08:11

## 2018-11-08 RX ADMIN — PROPOFOL 30 MG: 10 INJECTION, EMULSION INTRAVENOUS at 12:11

## 2018-11-08 RX ADMIN — LIDOCAINE HYDROCHLORIDE 50 MG: 20 INJECTION, SOLUTION INTRAVENOUS at 12:11

## 2018-11-08 RX ADMIN — METOPROLOL TARTRATE 50 MG: 50 TABLET ORAL at 08:11

## 2018-11-08 RX ADMIN — OXYCODONE HYDROCHLORIDE AND ACETAMINOPHEN 1 TABLET: 7.5; 325 TABLET ORAL at 02:11

## 2018-11-08 RX ADMIN — POLYETHYLENE GLYCOL 3350 17 G: 17 POWDER, FOR SOLUTION ORAL at 08:11

## 2018-11-08 RX ADMIN — LEVOTHYROXINE SODIUM 50 MCG: 50 TABLET ORAL at 06:11

## 2018-11-08 RX ADMIN — FUROSEMIDE 20 MG: 20 TABLET ORAL at 08:11

## 2018-11-08 RX ADMIN — PROPOFOL 50 MG: 10 INJECTION, EMULSION INTRAVENOUS at 12:11

## 2018-11-08 RX ADMIN — IPRATROPIUM BROMIDE AND ALBUTEROL SULFATE 3 ML: .5; 3 SOLUTION RESPIRATORY (INHALATION) at 07:11

## 2018-11-08 RX ADMIN — LIDOCAINE HYDROCHLORIDE 4 ML: 40 INJECTION, SOLUTION RETROBULBAR at 12:11

## 2018-11-08 RX ADMIN — NICOTINE 1 PATCH: 14 PATCH, EXTENDED RELEASE TRANSDERMAL at 08:11

## 2018-11-08 RX ADMIN — ESCITALOPRAM 20 MG: 10 TABLET, FILM COATED ORAL at 08:11

## 2018-11-08 RX ADMIN — IPRATROPIUM BROMIDE AND ALBUTEROL SULFATE 3 ML: .5; 3 SOLUTION RESPIRATORY (INHALATION) at 03:11

## 2018-11-08 RX ADMIN — OXYCODONE HYDROCHLORIDE AND ACETAMINOPHEN 1 TABLET: 7.5; 325 TABLET ORAL at 04:11

## 2018-11-08 RX ADMIN — SODIUM CHLORIDE, SODIUM LACTATE, POTASSIUM CHLORIDE, AND CALCIUM CHLORIDE: .6; .31; .03; .02 INJECTION, SOLUTION INTRAVENOUS at 02:11

## 2018-11-08 RX ADMIN — LIDOCAINE HYDROCHLORIDE 4 ML: 40 INJECTION, SOLUTION RETROBULBAR; TOPICAL at 12:11

## 2018-11-08 RX ADMIN — AMITRIPTYLINE HYDROCHLORIDE 25 MG: 25 TABLET, FILM COATED ORAL at 08:11

## 2018-11-08 RX ADMIN — ENOXAPARIN SODIUM 40 MG: 100 INJECTION SUBCUTANEOUS at 05:11

## 2018-11-08 RX ADMIN — KETAMINE HYDROCHLORIDE 20 MG: 10 INJECTION, SOLUTION INTRAMUSCULAR; INTRAVENOUS at 12:11

## 2018-11-08 RX ADMIN — PANTOPRAZOLE SODIUM 40 MG: 40 TABLET, DELAYED RELEASE ORAL at 08:11

## 2018-11-08 NOTE — TRANSFER OF CARE
"Anesthesia Transfer of Care Note    Patient: Bertha Kelly    Procedure(s) Performed: Procedure(s) (LRB):  BRONCHOSCOPY (N/A)    Patient location: ICU (procedure done in icu also)    Anesthesia Type: general    Post pain: adequate analgesia    Post assessment: no apparent anesthetic complications    Post vital signs: stable    Level of consciousness: awake and alert    Nausea/Vomiting: no nausea/vomiting    Complications: none    Transfer of care protocol was followed      Last vitals:   Visit Vitals  BP (!) 147/64   Pulse 88   Temp 37.7 °C (99.8 °F) (Oral)   Resp (!) 24   Ht 5' 3" (1.6 m)   Wt 50.2 kg (110 lb 10.7 oz)   LMP  (LMP Unknown)   SpO2 95%   Breastfeeding? No   BMI 19.60 kg/m²     "

## 2018-11-08 NOTE — PLAN OF CARE
Problem: Patient Care Overview  Goal: Plan of Care Review  Outcome: Ongoing (interventions implemented as appropriate)  Pt on 4L NC, resting comfortably with no complaints.  IC at bedside, pt maxed at 500.  CT put out 60ml serosanguinous drainage, CDI. [place] draining 650ml of yellow urine.  Safety maintained with no fall or injuries. MRI planned for am.  Will continue to monitor.

## 2018-11-08 NOTE — ASSESSMENT & PLAN NOTE
Contributing Nutrition Diagnosis  Moderate malnutrition in context of acute illness    Related to (etiology):   Decreased oral intake    Signs and Symptoms (as evidenced by):   1) <75% estimated intake >7 days  2) Moderate fat loss with sunken eyes, cheeks and mild in upper arm. Mild muscle loss noted in clavicle, hands, temples.       Interventions/Recommendations (treatment strategy):  1) ADAT to cardiac with ONS 2) monitor weight and intake    Nutrition Diagnosis Status:   Continues

## 2018-11-08 NOTE — PLAN OF CARE
Problem: Patient Care Overview  Goal: Plan of Care Review  Outcome: Ongoing (interventions implemented as appropriate)  Pt on 3L NC with Q8 duoneb treatments, CPT with aerobika, and IS, pt achieves 500 on IS.

## 2018-11-08 NOTE — ANESTHESIA PREPROCEDURE EVALUATION
11/08/2018  Bertha Kelly is a 68 y.o., female.    Anesthesia Evaluation    I have reviewed the Patient Summary Reports.    I have reviewed the Nursing Notes.      Review of Systems  Anesthesia Hx:  No problems with previous Anesthesia    Cardiovascular:   Hypertension, well controlled    Pulmonary:   Pneumonia Lung mass - S/P VATS. Stable on nasal O2   Endocrine:   Hypothyroidism        Physical Exam  General:  Well nourished    Airway/Jaw/Neck:  Airway Findings: Mallampati: II                Anesthesia Plan  Type of Anesthesia, risks & benefits discussed:  Anesthesia Type:  general  Patient's Preference:   Intra-op Monitoring Plan:   Intra-op Monitoring Plan Comments:   Post Op Pain Control Plan:   Post Op Pain Control Plan Comments:   Induction:   IV  Beta Blocker:  Patient is not currently on a Beta-Blocker (No further documentation required).       Informed Consent: Patient understands risks and agrees with Anesthesia plan.  Questions answered. Anesthesia consent signed with patient.  ASA Score: 3     Day of Surgery Review of History & Physical:    H&P update referred to the surgeon.         Ready For Surgery From Anesthesia Perspective.

## 2018-11-08 NOTE — PROGRESS NOTES
Progress Note  PULMONARY    Admit Date: 10/26/2018   11/08/2018      SUBJECTIVE:     Oct 30, breathing better, some appetite.  Oct 31, no c/o, tried pull out lines last pm, denies sob.  Nov 1, no c/o, says breathing and eating ok?  Nov 5, post pleurodesis. Confused.  No c/o  Nov 6, less confused.  No c/o  Nov 7, pleasant. No c/o.  Nov 8 , no c/0        PFSH and Allergies reviewed.    OBJECTIVE:     Vitals (Most recent):  Vitals:    11/08/18 0727   BP:    Pulse: 109   Resp: (!) 23   Temp:        Vitals (24 hour range):  Temp:  [97.7 °F (36.5 °C)-99.1 °F (37.3 °C)]   Pulse:  []   Resp:  [12-33]   BP: ()/(49-81)   SpO2:  [89 %-100 %]       Intake/Output Summary (Last 24 hours) at 11/8/2018 0730  Last data filed at 11/8/2018 0600  Gross per 24 hour   Intake 4137.67 ml   Output 3035 ml   Net 1102.67 ml          Physical Exam:  The patient's neuro status (alertness,orientation,cognitive function,motor skills,), pharyngeal exam (oral lesions, hygiene, abn dentition,), Neck (jvd,mass,thyroid,nodes in neck and above/below clavicle),RESPIRATORY(symmetry,effort,fremitus,percussion,auscultation),  Cor(rhythm,heart tones including gallops,perfusion,edema)ABD(distention,hepatic&splenomegaly,tenderness,masses), Skin(rash,cyanosis),Psyc(affect,judgement,).  Exam negative except for these pertinent findings:    Alert, confuse, left chest tube, decreased bs  And dull left ant chest    Radiographs reviewed: view by direct vision  Ct bilat effusion, dense left lower lung.  cxr left chest tube  Left vol loss and opacified    Results for orders placed during the hospital encounter of 10/26/18   X-Ray Chest 1 View    Narrative EXAMINATION:  XR CHEST 1 VIEW    CLINICAL HISTORY:  post thoracentesis;    TECHNIQUE:  Single frontal view of the chest was performed.    COMPARISON:  Chest radiograph 10/29/2018; CT dated 10/30/2018    FINDINGS:  Bibasilar airspace disease.  Normal size heart. Indistinct pulmonary vasculature. Blunted  costophrenic angles.  Left apical pneumothorax.      Impression 1. Post thoracentesis with reduction in size of left pleural effusion and development of apical pneumothorax.  This is more conspicuous on subsequent CT.  2. Small right pleural effusion.  3. Bibasilar airspace disease which could reflect atelectasis, pneumonia, edema.      Electronically signed by: Demar Ann  Date:    10/30/2018  Time:    10:33   ]    Labs     Recent Labs   Lab 11/08/18  0401   WBC 9.20   HGB 11.4*   HCT 34.5*      Electronically reviewed and signed by:   Ursula Chow MD   Signed on 10/30/18 at 13:20   Pathologist review of pleural fluid cell count:   The cytospin shows a predominance of mature appearing lymphocytes   with rare neutrophils and eosinophils seen in the background as well   as an occasional atypical mesothelial cell, favor reactive.     Correlate clinically.  Recent Labs   Lab 11/08/18  0401   *   K 3.8   CL 92*   CO2 32*   BUN 6*   CREATININE 0.6   GLU 87   CALCIUM 8.7   MG 1.6   PHOS 3.3   *   *   ALKPHOS 412*   BILITOT 1.3*   PROT 5.1*   ALBUMIN 1.7*   No results for input(s): PH, PCO2, PO2, HCO3 in the last 24 hours.  Microbiology Results (last 7 days)     Procedure Component Value Units Date/Time    Culture, Body Fluid (Aerobic) w/ GS [[id number]] Collected:  10/29/18 1815    Order Status:  Completed Specimen:  Body Fluid from Pleural Fluid Updated:  11/03/18 1039     AEROBIC CULTURE - FLUID No growth     Gram Stain Result Cytospin indicates:      Many WBC's      No organisms seen    Culture, Respiratory with Gram Stain [[id number]]  (Susceptibility) Collected:  10/29/18 1401    Order Status:  Completed Specimen:  Respiratory from Sputum, Expectorated Updated:  11/01/18 1313     Respiratory Culture --     CANDIDA ALBICANS  Moderate       Respiratory Culture --     STENOTROPHOMONAS (X.) MALTOPHILIA  Rare       Gram Stain (Respiratory) <10 epithelial cells per low power field.      Gram Stain (Respiratory) Few WBC's     Gram Stain (Respiratory) Moderate yeast     Gram Stain (Respiratory) Rare Gram positive rods    Blood culture x two cultures. Draw prior to antibiotics. [[id number]] Collected:  10/26/18 2348    Order Status:  Completed Specimen:  Blood from Peripheral, Left  Arm Updated:  11/01/18 1212     Blood Culture, Routine No growth after 5 days.    Narrative:       Aerobic and anaerobic    Blood culture x two cultures. Draw prior to antibiotics. [[id number]] Collected:  10/26/18 2348    Order Status:  Completed Specimen:  Blood from Peripheral, Right  Wrist Updated:  11/01/18 1212     Blood Culture, Routine No growth after 5 days.    Narrative:       Aerobic and anaerobic          Impression:  Active Hospital Problems    Diagnosis  POA    *Pleural effusion [J90]  Yes    Moderate malnutrition [E44.0]  Yes    Wheeze [R06.2]  Yes    Anorexia [R63.0]  Yes    SOB (shortness of breath) [R06.02]  Yes    Pneumonia of left lower lobe due to infectious organism [J18.1]  Yes    Hypothyroidism due to Hashimoto's thyroiditis [E03.8, E06.3]  Yes    Essential hypertension [I10]  Yes    Generalized anxiety disorder [F41.1]  Yes    Acute respiratory failure with hypoxia [J96.01]  Yes    Tobacco abuse [Z72.0]  Yes      Resolved Hospital Problems   No resolved problems to display.               Plan:     Oct 30, fluid was lymph predominate exudate.  Cancer concerning.  F/u cxr am - if fluid building up would recommend thoracoscopic procedure with Dr Jones.  Cytology pending.  procalcitonin was very low.  Check d dimer and bnp am.  Discussed with pt and family.  Oct 31, bnp up, cytospin cells not felt malignant - cytology pending.  D dimer good.  cxr left effusion increasing. Dose lasix, check echo.  May need Dr Jones for procedure?  F/u cxr am to decide.    Nov 1, echo na, cxr worsening - fluid rapidly returning.  S. Maltophilia in sputum but procalcitonin was low.  Cytology na.  Discussed  with Dr Suarez.      Pt maybe a little better but is non ambulatory and frail ( too frail to do therapy).  Will dose lasix 20, ask Dr Jones to see.  Pt wanting to go home????    lft sl up and co2 rising.    Nov 5, cytology with adenoca c/w lung. Post pleurodysis now.     Consider oncology consult to facilitate disposition.  Pt not thriving.    Nov 6, min confused, looks to be progressing, needs mobilize.      Nov 7, lft up post op with ast 449, otherwise same.  Looks frail.  Nov 8 left chest opacified with vol loss, bronch asap to clear out.  Pt not thriving.                  .

## 2018-11-08 NOTE — NURSING
Notified Dr Jones of CXR results this am and plan for bronch per Dr Abdi. No new orders at this time.

## 2018-11-08 NOTE — PLAN OF CARE
Problem: Nutrition, Imbalanced: Inadequate Oral Intake (Adult)  Goal: Improved Oral Intake  Patient will demonstrate the desired outcomes by discharge/transition of care.  Intervention: sodium and fat modified diet and nutrition supplement therapy-commercial beverage     Recommendation:   1) s/p Bronch advance diet back to cardiac   2) Add Boost Plus, tid      Goals: 1) Pt will consume >=50% EEN by f/u   Nutrition Goal Status: Continues  Communication of RD Recs: (POC, sticky note)

## 2018-11-08 NOTE — PROGRESS NOTES
" [facility]  Adult Nutrition  Progress Note    SUMMARY   Intervention: sodium and fat modified diet and nutrition supplement therapy-commercial beverage    Recommendation:   1) s/p Bronch advance diet back to cardiac   2) Add Boost Plus, tid     Goals: 1) Pt will consume >=50% EEN by f/u   Nutrition Goal Status: Continues  Communication of RD Recs: (POC, sticky note)    Reason for Assessment    Reason for Assessment: length of stay  Diagnosis: pulmonary disease  Relevant Medical History: HTN, pneumonia  Interdisciplinary Rounds: did not attend    General Information Comments: Admitted with respiratory failure, SOB, coughing x 3 days. Fatigue x 1 week.  Pt just out of recovery, able to speak. Reports poor appetite pta. NFPE reveals moderate fat loss with sunken eyes, cheeks and mild in upper arm. Mild muscle loss noted in clavicle, hands, temples.   18 Pt diet upgraded from clear-> full-> cardiac - pt was tolerating 50% of meals yesterday. NPO today for Bronch. Denies GI symptoms.     Nutrition Discharge Planning: To be determined- Cardiac + Boost Plus with meals    Nutrition Risk Screen    Nutrition Risk Screen: no indicators present    Nutrition/Diet History    Patient Reported Diet/Restrictions/Preferences: general  Do you have any cultural, spiritual, Episcopal conflicts, given your current situation?: no  Supplemental Drinks or Food Habits: (none)  Food Allergies: NKFA(No intolerances noted. )    Anthropometrics    Temp: 99.8 °F (37.7 °C)  Height Method: Stated  Height: 5' 3"  Height (inches): 63 in  Weight Method: Bed Scale  Weight: 50.2 kg (, bed scale)  Weight (lb): 110 lb  Ideal Body Weight (IBW), Female: 115 lb  % Ideal Body Weight, Female (lb): 111.3 lb  BMI (Calculated): 22.7  Usual Body Weight (UBW), k.64 kg(per chart review 18)  % Usual Body Weight: 99.22  % Weight Change From Usual Weight: -0.99 %       Lab/Procedures/Meds    Pertinent Labs Reviewed: " reviewed  BMP  Lab Results   Component Value Date     (L) 11/08/2018    K 3.8 11/08/2018    CL 92 (L) 11/08/2018    CO2 32 (H) 11/08/2018    BUN 6 (L) 11/08/2018    CREATININE 0.6 11/08/2018    CALCIUM 8.7 11/08/2018    ANIONGAP 10 11/08/2018    ESTGFRAFRICA >60 11/08/2018    EGFRNONAA >60 11/08/2018     No results found for: CRP    Pertinent Medications Reviewed: reviewed  Lasix, polyethylene glycol, lactated ringers @ 100 ml/hr    Physical Findings/Assessment    Overall Physical Appearance: advanced age, loss of muscle mass, loss of subcutaneous fat, weak  Oral/Mouth Cavity: tooth/teeth missing  Skin: (Karsten score 15, chest incision)    Estimated/Assessed Needs  Admission  Weight Used For Calorie Calculations: 58.1 kg (128 lb 1.4 oz)  Energy Calorie Requirements (kcal): 0906-0798 (AF 1.3-1.5)  Energy Need Method: [place]  Protein Requirements:  (1.2-2.0 gm/kg/day per critical care guide)  Weight Used For Protein Calculations: 58.1 kg (128 lb 1.4 oz)     Fluid Need Method: (or per MD)  RDA Method (mL): 1605  CHO Requirement: n/a      Nutrition Prescription Ordered    Current Diet Order: NPO  Nutrition Order Comments: was on cardiac diet prior to NPO  yesterday    Evaluation of Received Nutrient/Fluid Intake    IV Fluid (mL): 100(mls/hr)  Energy Calories Required: not meeting needs  Protein Required: not meeting needs  Fluid Required: meeting needs  % Intake of Estimated Energy Needs: 0%  % Meal Intake: 0% NPO    Nutrition Risk    Level of Risk/Frequency of Follow-up: (2 x wkly)     Assessment and Plan    Moderate malnutrition    Contributing Nutrition Diagnosis  Moderate malnutrition in context of acute illness    Related to (etiology):   Decreased oral intake    Signs and Symptoms (as evidenced by):   1) <75% estimated intake >7 days  2) Moderate fat loss with sunken eyes, cheeks and mild in upper arm. Mild muscle loss noted in clavicle, hands, temples.       Interventions/Recommendations  (treatment strategy):  1) ADAT to cardiac with ONS 2) monitor weight and intake    Nutrition Diagnosis Status:   Continues            Monitor and Evaluation    Food and Nutrient Intake: energy intake  Food and Nutrient Adminstration: diet order  Physical Activity and Function: nutrition-related ADLs and IADLs  Biochemical Data, Medical Tests and Procedures: inflammatory profile, lytes  Nutrition-Focused Physical Findings: skin, overall appearance     Nutrition Follow-Up    RD Follow-up?: Yes

## 2018-11-08 NOTE — ANESTHESIA POSTPROCEDURE EVALUATION
"Anesthesia Post Evaluation    Patient: Bertha Kelly    Procedure(s) Performed: Procedure(s) (LRB):  BRONCHOSCOPY (N/A)    Final Anesthesia Type: general  Patient location during evaluation: PACU  Patient participation: Yes- Able to Participate  Level of consciousness: awake and alert  Post-procedure vital signs: reviewed and stable  Pain management: adequate  Airway patency: patent  PONV status at discharge: No PONV  Anesthetic complications: no      Cardiovascular status: blood pressure returned to baseline and hemodynamically stable  Respiratory status: unassisted  Hydration status: euvolemic  Follow-up not needed.        Visit Vitals  BP (!) 147/64   Pulse 88   Temp 37.7 °C (99.8 °F) (Oral)   Resp (!) 24   Ht 5' 3" (1.6 m)   Wt 50.2 kg (110 lb 10.7 oz)   LMP  (LMP Unknown)   SpO2 95%   Breastfeeding? No   BMI 19.60 kg/m²       Pain/Heather Score: Pain Assessment Performed: Yes (11/8/2018 12:55 PM)  Presence of Pain: denies (11/8/2018 12:08 PM)  Pain Rating Prior to Med Admin: 10 (11/8/2018  4:05 AM)  Pain Rating Post Med Admin: 0 (11/7/2018  5:41 PM)        "

## 2018-11-08 NOTE — PLAN OF CARE
Problem: Patient Care Overview  Goal: Plan of Care Review  Outcome: Ongoing (interventions implemented as appropriate)  Plan of care reviewed. Bronch today d/t mucous plug to L lobe. 4L per NC with spo2 90-95%. VSS. SR-ST. CT to L lateral chest - 35ml output. Good UO. No BM. Very resistant to increased activity. Up in chair x 1 hour but refused any more. Sister in law at bedside throughout shift. Pending MRI of brain.

## 2018-11-08 NOTE — PLAN OF CARE
11/07/18 2331   Patient Assessment/Suction   Level of Consciousness (AVPU) alert   Respiratory Effort Unlabored   Expansion/Accessory Muscles/Retractions no retractions;no use of accessory muscles   All Lung Fields Breath Sounds diminished   Rhythm/Pattern, Respiratory shallow   Cough Frequency infrequent   Cough Type nonproductive   PRE-TX-O2-ETCO2   O2 Device (Oxygen Therapy) nasal cannula   Flow (L/min) 4   Oxygen Concentration (%) 32   SpO2 98 %   Pulse Oximetry Type Continuous   Pulse 89   Resp 12   Aerosol Therapy   $ Aerosol Therapy Charges Aerosol Treatment   Respiratory Treatment Status given   SVN/Inhaler Treatment Route mask   Position During Treatment HOB at 45 degrees   Patient Tolerance good   Post-Treatment   Post-treatment Heart Rate (beats/min) 90   Post-treatment Resp Rate (breaths/min) 14   All Fields Breath Sounds unchanged   Ready to Wean/Extubation Screen   FIO2<=50 (chart decimal) 0.32

## 2018-11-08 NOTE — PROCEDURES
11/8/2018    After informed consent and time out and sedation by anesthesia- pt had bronchoscopy.  ebl 0 and no complications.  Copious thick secretions noted in left airways.  Washes done to clear completely.  cutlure submitted.

## 2018-11-09 LAB
ALBUMIN SERPL BCP-MCNC: 1.6 G/DL
ALP SERPL-CCNC: 456 U/L
ALT SERPL W/O P-5'-P-CCNC: 278 U/L
ANION GAP SERPL CALC-SCNC: 9 MMOL/L
AST SERPL-CCNC: 184 U/L
BASOPHILS # BLD AUTO: 0 K/UL
BASOPHILS NFR BLD: 0.1 %
BILIRUB SERPL-MCNC: 0.8 MG/DL
BUN SERPL-MCNC: 6 MG/DL
CALCIUM SERPL-MCNC: 9 MG/DL
CHLORIDE SERPL-SCNC: 95 MMOL/L
CO2 SERPL-SCNC: 31 MMOL/L
CREAT SERPL-MCNC: 0.6 MG/DL
DIFFERENTIAL METHOD: ABNORMAL
EOSINOPHIL # BLD AUTO: 0.1 K/UL
EOSINOPHIL NFR BLD: 1.2 %
ERYTHROCYTE [DISTWIDTH] IN BLOOD BY AUTOMATED COUNT: 13.8 %
EST. GFR  (AFRICAN AMERICAN): >60 ML/MIN/1.73 M^2
EST. GFR  (NON AFRICAN AMERICAN): >60 ML/MIN/1.73 M^2
GLUCOSE SERPL-MCNC: 88 MG/DL
HAV IGM SERPL QL IA: NEGATIVE
HBV CORE IGM SERPL QL IA: NEGATIVE
HBV SURFACE AG SERPL QL IA: NEGATIVE
HCT VFR BLD AUTO: 33.3 %
HCV AB SERPL QL IA: NEGATIVE
HGB BLD-MCNC: 11 G/DL
LYMPHOCYTES # BLD AUTO: 1.6 K/UL
LYMPHOCYTES NFR BLD: 15.5 %
MAGNESIUM SERPL-MCNC: 1.9 MG/DL
MCH RBC QN AUTO: 30 PG
MCHC RBC AUTO-ENTMCNC: 33 G/DL
MCV RBC AUTO: 91 FL
MONOCYTES # BLD AUTO: 1.4 K/UL
MONOCYTES NFR BLD: 13.3 %
NEUTROPHILS # BLD AUTO: 7.4 K/UL
NEUTROPHILS NFR BLD: 69.9 %
PHOSPHATE SERPL-MCNC: 3.7 MG/DL
PLATELET # BLD AUTO: 383 K/UL
PMV BLD AUTO: 8.2 FL
POTASSIUM SERPL-SCNC: 4 MMOL/L
PROT SERPL-MCNC: 5.1 G/DL
RBC # BLD AUTO: 3.67 M/UL
SODIUM SERPL-SCNC: 135 MMOL/L
WBC # BLD AUTO: 10.6 K/UL

## 2018-11-09 PROCEDURE — 94761 N-INVAS EAR/PLS OXIMETRY MLT: CPT

## 2018-11-09 PROCEDURE — A9585 GADOBUTROL INJECTION: HCPCS | Performed by: INTERNAL MEDICINE

## 2018-11-09 PROCEDURE — 83735 ASSAY OF MAGNESIUM: CPT

## 2018-11-09 PROCEDURE — 25000003 PHARM REV CODE 250: Performed by: ANESTHESIOLOGY

## 2018-11-09 PROCEDURE — 94664 DEMO&/EVAL PT USE INHALER: CPT

## 2018-11-09 PROCEDURE — 63600175 PHARM REV CODE 636 W HCPCS: Performed by: INTERNAL MEDICINE

## 2018-11-09 PROCEDURE — 25000003 PHARM REV CODE 250: Performed by: INTERNAL MEDICINE

## 2018-11-09 PROCEDURE — 99900035 HC TECH TIME PER 15 MIN (STAT)

## 2018-11-09 PROCEDURE — 25000003 PHARM REV CODE 250: Performed by: NURSE PRACTITIONER

## 2018-11-09 PROCEDURE — S4991 NICOTINE PATCH NONLEGEND: HCPCS | Performed by: NURSE PRACTITIONER

## 2018-11-09 PROCEDURE — 99231 SBSQ HOSP IP/OBS SF/LOW 25: CPT | Mod: ,,, | Performed by: INTERNAL MEDICINE

## 2018-11-09 PROCEDURE — 94640 AIRWAY INHALATION TREATMENT: CPT

## 2018-11-09 PROCEDURE — 25000242 PHARM REV CODE 250 ALT 637 W/ HCPCS: Performed by: INTERNAL MEDICINE

## 2018-11-09 PROCEDURE — 85025 COMPLETE CBC W/AUTO DIFF WBC: CPT

## 2018-11-09 PROCEDURE — 99233 SBSQ HOSP IP/OBS HIGH 50: CPT | Mod: ,,, | Performed by: INTERNAL MEDICINE

## 2018-11-09 PROCEDURE — 36415 COLL VENOUS BLD VENIPUNCTURE: CPT

## 2018-11-09 PROCEDURE — 80053 COMPREHEN METABOLIC PANEL: CPT

## 2018-11-09 PROCEDURE — 84100 ASSAY OF PHOSPHORUS: CPT

## 2018-11-09 PROCEDURE — 94799 UNLISTED PULMONARY SVC/PX: CPT

## 2018-11-09 PROCEDURE — 20000000 HC ICU ROOM

## 2018-11-09 PROCEDURE — 25500020 PHARM REV CODE 255: Performed by: INTERNAL MEDICINE

## 2018-11-09 PROCEDURE — 27000221 HC OXYGEN, UP TO 24 HOURS

## 2018-11-09 RX ORDER — GADOBUTROL 604.72 MG/ML
5 INJECTION INTRAVENOUS
Status: COMPLETED | OUTPATIENT
Start: 2018-11-09 | End: 2018-11-09

## 2018-11-09 RX ORDER — GADOBUTROL 604.72 MG/ML
INJECTION INTRAVENOUS
Status: DISPENSED
Start: 2018-11-09 | End: 2018-11-09

## 2018-11-09 RX ADMIN — IPRATROPIUM BROMIDE AND ALBUTEROL SULFATE 3 ML: .5; 3 SOLUTION RESPIRATORY (INHALATION) at 12:11

## 2018-11-09 RX ADMIN — LORAZEPAM 1.5 MG: 1 TABLET ORAL at 09:11

## 2018-11-09 RX ADMIN — METOPROLOL TARTRATE 50 MG: 50 TABLET ORAL at 08:11

## 2018-11-09 RX ADMIN — PANTOPRAZOLE SODIUM 40 MG: 40 TABLET, DELAYED RELEASE ORAL at 08:11

## 2018-11-09 RX ADMIN — GADOBUTROL 5 ML: 604.72 INJECTION INTRAVENOUS at 10:11

## 2018-11-09 RX ADMIN — ENOXAPARIN SODIUM 40 MG: 100 INJECTION SUBCUTANEOUS at 05:11

## 2018-11-09 RX ADMIN — OXYCODONE HYDROCHLORIDE AND ACETAMINOPHEN 1 TABLET: 7.5; 325 TABLET ORAL at 01:11

## 2018-11-09 RX ADMIN — IPRATROPIUM BROMIDE AND ALBUTEROL SULFATE 3 ML: .5; 3 SOLUTION RESPIRATORY (INHALATION) at 07:11

## 2018-11-09 RX ADMIN — SODIUM CHLORIDE, SODIUM LACTATE, POTASSIUM CHLORIDE, AND CALCIUM CHLORIDE: .6; .31; .03; .02 INJECTION, SOLUTION INTRAVENOUS at 04:11

## 2018-11-09 RX ADMIN — POLYETHYLENE GLYCOL 3350 17 G: 17 POWDER, FOR SOLUTION ORAL at 08:11

## 2018-11-09 RX ADMIN — NICOTINE 1 PATCH: 14 PATCH, EXTENDED RELEASE TRANSDERMAL at 08:11

## 2018-11-09 RX ADMIN — IPRATROPIUM BROMIDE AND ALBUTEROL SULFATE 3 ML: .5; 3 SOLUTION RESPIRATORY (INHALATION) at 11:11

## 2018-11-09 RX ADMIN — OXYCODONE HYDROCHLORIDE AND ACETAMINOPHEN 1 TABLET: 7.5; 325 TABLET ORAL at 08:11

## 2018-11-09 RX ADMIN — AMITRIPTYLINE HYDROCHLORIDE 25 MG: 25 TABLET, FILM COATED ORAL at 08:11

## 2018-11-09 RX ADMIN — LEVOTHYROXINE SODIUM 50 MCG: 50 TABLET ORAL at 05:11

## 2018-11-09 RX ADMIN — OXYCODONE HYDROCHLORIDE AND ACETAMINOPHEN 1 TABLET: 7.5; 325 TABLET ORAL at 05:11

## 2018-11-09 RX ADMIN — FUROSEMIDE 20 MG: 20 TABLET ORAL at 08:11

## 2018-11-09 RX ADMIN — ESCITALOPRAM 20 MG: 10 TABLET, FILM COATED ORAL at 08:11

## 2018-11-09 RX ADMIN — IPRATROPIUM BROMIDE AND ALBUTEROL SULFATE 3 ML: .5; 3 SOLUTION RESPIRATORY (INHALATION) at 03:11

## 2018-11-09 RX ADMIN — SODIUM CHLORIDE, SODIUM LACTATE, POTASSIUM CHLORIDE, AND CALCIUM CHLORIDE: .6; .31; .03; .02 INJECTION, SOLUTION INTRAVENOUS at 03:11

## 2018-11-09 NOTE — PLAN OF CARE
Receiving aerosol tx q8. Using IS and Acapella therapy. Needs encouragement and cooperates with therapy

## 2018-11-09 NOTE — PROGRESS NOTES
Pt up to bedside commode with large BM then transferred to chair until 1340. Pt needs a lot of encouragement to mobilize. Will encourage patient to get up for dinner too.

## 2018-11-09 NOTE — PLAN OF CARE
11/09/18 0018   Patient Assessment/Suction   Level of Consciousness (AVPU) alert   Respiratory Effort Unlabored   Expansion/Accessory Muscles/Retractions no use of accessory muscles   All Lung Fields Breath Sounds coarse;diminished   Rhythm/Pattern, Respiratory shallow   Cough Frequency infrequent   Cough Type fair;productive   Sputum Amount small   Sputum Color clear;yellow, pale   Sputum Consistency thick   PRE-TX-O2-ETCO2   O2 Device (Oxygen Therapy) nasal cannula w/ humidification   $ Is the patient on Low Flow Oxygen? Yes   Flow (L/min) 4   SpO2 96 %   Pulse 79   Resp (!) 25   Aerosol Therapy   $ Aerosol Therapy Charges Aerosol Treatment   Respiratory Treatment Status given   SVN/Inhaler Treatment Route mask;with oxygen   Position During Treatment HOB at 45 degrees   Patient Tolerance good   Post-Treatment   Post-treatment Heart Rate (beats/min) 77   Post-treatment Resp Rate (breaths/min) 24   All Fields Breath Sounds unchanged   Incentive Spirometer   $ Incentive Spirometer Charges done with encouragement   Administration (Incentive Spirometer) done with encouragement   Number of Repetitions (Incentive Spirometer) 10   Level (mL) (Incentive Spirometer) 500   Patient Tolerance fair   Vibratory PEP Therapy   $ Vibratory PEP Charges Aerobika Therapy   Administration done with encouragement   Number of Repetitions 15   Pt tolerates NC and treatments well. Pt has fair effort with IS and Aerobika.

## 2018-11-09 NOTE — PROGRESS NOTES
Progress Note  PULMONARY    Admit Date: 10/26/2018   11/09/2018      SUBJECTIVE:     Oct 30, breathing better, some appetite.  Oct 31, no c/o, tried pull out lines last pm, denies sob.  Nov 1, no c/o, says breathing and eating ok?  Nov 5, post pleurodesis. Confused.  No c/o  Nov 6, less confused.  No c/o  Nov 7, pleasant. No c/o.  Nov 8 , no c/0  Nov 9, same, no c/o      PFSH and Allergies reviewed.    OBJECTIVE:     Vitals (Most recent):  Vitals:    11/09/18 0600   BP: 136/60   Pulse: 92   Resp: 20   Temp:        Vitals (24 hour range):  Temp:  [97.6 °F (36.4 °C)-99.8 °F (37.7 °C)]   Pulse:  []   Resp:  [12-33]   BP: ()/(50-70)   SpO2:  [88 %-100 %]       Intake/Output Summary (Last 24 hours) at 11/9/2018 0751  Last data filed at 11/9/2018 0530  Gross per 24 hour   Intake 2350 ml   Output 1810 ml   Net 540 ml          Physical Exam:  The patient's neuro status (alertness,orientation,cognitive function,motor skills,), pharyngeal exam (oral lesions, hygiene, abn dentition,), Neck (jvd,mass,thyroid,nodes in neck and above/below clavicle),RESPIRATORY(symmetry,effort,fremitus,percussion,auscultation),  Cor(rhythm,heart tones including gallops,perfusion,edema)ABD(distention,hepatic&splenomegaly,tenderness,masses), Skin(rash,cyanosis),Psyc(affect,judgement,).  Exam negative except for these pertinent findings:    Alert, confuse, left chest tube, decreased bs  And dull left ant chest    Radiographs reviewed: view by direct vision  cxr with reasonable aeration left lung    Results for orders placed during the hospital encounter of 10/26/18   X-Ray Chest 1 View    Narrative EXAMINATION:  XR CHEST 1 VIEW    CLINICAL HISTORY:  post thoracentesis;    TECHNIQUE:  Single frontal view of the chest was performed.    COMPARISON:  Chest radiograph 10/29/2018; CT dated 10/30/2018    FINDINGS:  Bibasilar airspace disease.  Normal size heart. Indistinct pulmonary vasculature. Blunted costophrenic angles.  Left apical  pneumothorax.      Impression 1. Post thoracentesis with reduction in size of left pleural effusion and development of apical pneumothorax.  This is more conspicuous on subsequent CT.  2. Small right pleural effusion.  3. Bibasilar airspace disease which could reflect atelectasis, pneumonia, edema.      Electronically signed by: Demar Ann  Date:    10/30/2018  Time:    10:33   ]    Labs     Recent Labs   Lab 11/09/18  0335   WBC 10.60   HGB 11.0*   HCT 33.3*   *   Electronically reviewed and signed by:   Ursula Chow MD   Signed on 10/30/18 at 13:20   Pathologist review of pleural fluid cell count:   The cytospin shows a predominance of mature appearing lymphocytes   with rare neutrophils and eosinophils seen in the background as well   as an occasional atypical mesothelial cell, favor reactive.     Correlate clinically.  Recent Labs   Lab 11/09/18  0335   *   K 4.0   CL 95   CO2 31*   BUN 6*   CREATININE 0.6   GLU 88   CALCIUM 9.0   MG 1.9   PHOS 3.7   *   *   ALKPHOS 456*   BILITOT 0.8   PROT 5.1*   ALBUMIN 1.6*   No results for input(s): PH, PCO2, PO2, HCO3 in the last 24 hours.  Microbiology Results (last 7 days)     Procedure Component Value Units Date/Time    Culture, Respiratory with Gram Stain [[id number]] Collected:  11/08/18 1258    Order Status:  Completed Specimen:  Respiratory from Bronchial Wash Updated:  11/08/18 2307     Gram Stain (Respiratory) Few WBC's     Gram Stain (Respiratory) Rare Gram positive cocci    Culture, Body Fluid (Aerobic) w/ GS [[id number]] Collected:  10/29/18 1815    Order Status:  Completed Specimen:  Body Fluid from Pleural Fluid Updated:  11/03/18 1039     AEROBIC CULTURE - FLUID No growth     Gram Stain Result Cytospin indicates:      Many WBC's      No organisms seen          Impression:  Active Hospital Problems    Diagnosis  POA    *Pleural effusion [J90]  Yes    Atelectasis [J98.11]  Yes    Abnormal CXR [R93.89]  Yes    Moderate  malnutrition [E44.0]  Yes    Wheeze [R06.2]  Yes    Anorexia [R63.0]  Yes    SOB (shortness of breath) [R06.02]  Yes    Pneumonia of left lower lobe due to infectious organism [J18.1]  Yes    Hypothyroidism due to Hashimoto's thyroiditis [E03.8, E06.3]  Yes    Essential hypertension [I10]  Yes    Generalized anxiety disorder [F41.1]  Yes    Acute respiratory failure with hypoxia [J96.01]  Yes    Tobacco abuse [Z72.0]  Yes      Resolved Hospital Problems   No resolved problems to display.               Plan:     Oct 30, fluid was lymph predominate exudate.  Cancer concerning.  F/u cxr am - if fluid building up would recommend thoracoscopic procedure with Dr Jones.  Cytology pending.  procalcitonin was very low.  Check d dimer and bnp am.  Discussed with pt and family.  Oct 31, bnp up, cytospin cells not felt malignant - cytology pending.  D dimer good.  cxr left effusion increasing. Dose lasix, check echo.  May need Dr Jones for procedure?  F/u cxr am to decide.    Nov 1, echo na, cxr worsening - fluid rapidly returning.  S. Maltophilia in sputum but procalcitonin was low.  Cytology na.  Discussed with Dr Suarez.      Pt maybe a little better but is non ambulatory and frail ( too frail to do therapy).  Will dose lasix 20, ask Dr Jones to see.  Pt wanting to go home????    lft sl up and co2 rising.    Nov 5, cytology with adenoca c/w lung. Post pleurodysis now.     Consider oncology consult to facilitate disposition.  Pt not thriving.    Nov 6, min confused, looks to be progressing, needs mobilize.      Nov 7, lft up post op with ast 449, otherwise same.  Looks frail.  Nov 8 left chest opacified with vol loss, bronch asap to clear out.  Pt not thriving.  Nov 9,   cxr with left lung aerated, pt not thriving. Discussed with .  Eldred not good.                 .

## 2018-11-09 NOTE — PROGRESS NOTES
[facility]  Hematology/Oncology  Progress Note    Patient Name: Bertha Kelly  Admission Date: 10/26/2018  Hospital Length of Stay: 13 days  Code Status: Full Code   November 9  Subjective:     Interval History:   Patient with lung cancer adenocarcinoma as well as pneumonia tolerating antimicrobial therapy for such.  She does have locally advanced disease with encasement of arteries on imaging studies.  This morning she is comfortable in no distress. She remains on oxygen.  She is able to report that she is not having pain after VATSperformed and her breathing is much easier than at admit.  She is having a little difficulty understanding that she has cancer.  Every day as see you she is asking if I am sure that she has cancer and diarrhea treated again this morning that indeed she has been found to have cancer      Medications:  Continuous Infusions:   lactated ringers 100 mL/hr at 11/09/18 0300     Scheduled Meds:   albuterol-ipratropium  3 mL Nebulization Q8H    amitriptyline  25 mg Oral QHS    enoxaparin  40 mg Subcutaneous Daily    escitalopram oxalate  20 mg Oral Daily    furosemide  20 mg Oral Daily    gadobutrol        levothyroxine  50 mcg Oral Before breakfast    lidocaine (PF) 10 mg/ml (1%)        lidocaine-EPINEPHrine (PF) 1%-1:200,000        metoprolol tartrate  50 mg Oral BID    nicotine  1 patch Transdermal Daily    oxymetazoline        pantoprazole  40 mg Oral Daily    polyethylene glycol  17 g Oral BID     PRN Meds:acetaminophen, dextrose 50%, dextrose 50%, glucagon (human recombinant), glucose, glucose, LORazepam, ondansetron, oxyCODONE-acetaminophen, sodium chloride 0.9%     Review of Systems   Constitutional: Positive for activity change and fatigue. Negative for diaphoresis and fever.   HENT: Negative for congestion and facial swelling.    Eyes: Negative for discharge and itching.   Respiratory: Positive for shortness of breath. Negative for cough, chest  tightness and wheezing.    Cardiovascular: Negative for chest pain and leg swelling.   Gastrointestinal: Negative for abdominal distention, abdominal pain, constipation, diarrhea and nausea.   Endocrine: Negative for cold intolerance and heat intolerance.   Musculoskeletal: Negative for arthralgias and back pain.   Skin: Negative for color change and pallor.   Allergic/Immunologic: Negative for environmental allergies and food allergies.   Neurological: Positive for weakness. Negative for dizziness, tremors and light-headedness.   Hematological: Negative for adenopathy. Does not bruise/bleed easily.   Psychiatric/Behavioral: Negative for agitation and behavioral problems.   All other systems reviewed and are negative.      Objective:     Vital Signs (Most Recent):  Temp: 98.5 °F (36.9 °C) (11/09/18 1115)  Pulse: 67 (11/09/18 1555)  Resp: 12 (11/09/18 1555)  BP: (!) 88/50 (11/09/18 1500)  SpO2: 99 % (11/09/18 1555) Vital Signs (24h Range):  Temp:  [97.6 °F (36.4 °C)-98.5 °F (36.9 °C)] 98.5 °F (36.9 °C)  Pulse:  [67-99] 67  Resp:  [11-33] 12  SpO2:  [90 %-100 %] 99 %  BP: ()/(50-60) 88/50     Weight: 49.3 kg (108 lb 11 oz)  Body mass index is 19.25 kg/m².  Body surface area is 1.48 meters squared.      Intake/Output Summary (Last 24 hours) at 11/9/2018 1617  Last data filed at 11/9/2018 1123  Gross per 24 hour   Intake 2600 ml   Output 1105 ml   Net 1495 ml       Physical Exam    Constitutional: She is oriented to person, place, and time. No distress.   HENT:   Head: Normocephalic and atraumatic.   Right Ear: External ear normal.   Left Ear: External ear normal.   Mouth/Throat: Oropharynx is clear and moist. No oropharyngeal exudate.   Eyes: Conjunctivae and EOM are normal. Right eye exhibits no discharge. Left eye exhibits no discharge.   Neck: Normal range of motion. Neck supple. No JVD present. No tracheal deviation present. No thyromegaly present.   Cardiovascular: Normal rate, regular rhythm and normal  heart sounds.   No murmur heard.  Pulmonary/Chest: Effort normal. No respiratory distress. She has no wheezes. She exhibits no tenderness.   Abdominal: Soft. Bowel sounds are normal. She exhibits no distension. There is no tenderness.   Musculoskeletal: Normal range of motion. She exhibits no edema or tenderness.   Neurological: She is alert and oriented to person, place, and time.   Skin: Skin is dry. She is not diaphoretic. No erythema. No pallor.       Significant Labs:     Lab Results   Component Value Date    WBC 10.60 11/09/2018    RBC 3.67 (L) 11/09/2018    HGB 11.0 (L) 11/09/2018    HCT 33.3 (L) 11/09/2018    MCV 91 11/09/2018    MCH 30.0 11/09/2018    MCHC 33.0 11/09/2018    RDW 13.8 11/09/2018     (H) 11/09/2018    MPV 8.2 (L) 11/09/2018    GRAN 7.4 11/09/2018    GRAN 69.9 11/09/2018    LYMPH 1.6 11/09/2018    LYMPH 15.5 (L) 11/09/2018    MONO 1.4 (H) 11/09/2018    MONO 13.3 11/09/2018    EOS 0.1 11/09/2018    BASO 0.00 11/09/2018    EOSINOPHIL 1.2 11/09/2018    BASOPHIL 0.1 11/09/2018         Assessment/Plan:     Active Diagnoses:    Diagnosis Date Noted POA    PRINCIPAL PROBLEM:  Pleural effusion [J90] 10/29/2018 Yes    Atelectasis [J98.11] 11/08/2018 Yes    Abnormal CXR [R93.89] 11/08/2018 Yes    Moderate malnutrition [E44.0] 11/05/2018 Yes    Wheeze [R06.2] 10/29/2018 Yes    Anorexia [R63.0] 10/29/2018 Yes    SOB (shortness of breath) [R06.02] 10/29/2018 Yes    Pneumonia of left lower lobe due to infectious organism [J18.1] 10/27/2018 Yes    Hypothyroidism due to Hashimoto's thyroiditis [E03.8, E06.3] 10/27/2018 Yes    Essential hypertension [I10] 10/27/2018 Yes    Generalized anxiety disorder [F41.1] 10/27/2018 Yes    Acute respiratory failure with hypoxia [J96.01] 10/27/2018 Yes    Tobacco abuse [Z72.0] 10/27/2018 Yes      Problems Resolved During this Admission:       Hypotensive of encouraged her to increase her salt intake for such.  Reassess with new set of vital signs  pneumonia with Candida and Stenotrophomonas  Continue antimicrobials for such.  Briefly discuss treatment options to include concomitant chemo radiation when she is clinically more stable.  For now she is to focus on eating and keeping up her nutritional status.  Anemia stable not requiring intervention at this time.  Did discuss the importance of tobacco cessation.     Marsha Knox MD  Hematology/Oncology  [facility]

## 2018-11-09 NOTE — PROGRESS NOTES
Progress Note  Hospital Medicine  Patient Name:Bertha Kelly  MRN:  [id number]  Patient Class: IP- Inpatient  Admit Date: 10/26/2018  Length of Stay: 13 days  Expected Discharge Date:   Attending Physician: Vicki Suarez MD  Primary Care Provider:  James Colon MD    SUBJECTIVE:     Principal Problem: Pleural effusion  Initial history of present illness: Bertha Kelly is a 69 y/o female with a PMHx of HTN and thyroid disease who presented to the ED today with c/o SOB which began 3 weeks ago and has progressively gotten worse.  Pt states that she becomes SOB with activity.  She reports that she received a pneumonia vaccine at her doctor's office about a month ago and began having some chest congestion and cough a few days later.  She is a current smoker of about 1/2 pack daily.  Reports increased fatigue and chills, but denies fever, chest pain, N/V, and dysuria.  Reports weight loss of a few pounds in the past 3 weeks due to poor appetite, but states that prior to this illness, weight was stable and she had a very good appetite.  Pt's CXR reveals a LLL pneumonia and was noted to be mildly hypoxic initially requiring supplemental oxygen.  She will be admitted to the service of hospital medicine for further treatment.    PMH/PSH/SH/FH/Meds: reviewed.    Symptoms/Review of Systems: In ICU s/p VATS, left pleural biopsy, lung biopsy and left pleurodesis. Feeling better. Patient had bronchoscopy done yesterday. Going for MRI brain. Patient frail and weak and not thriving.  Diet: Cardiac  Activity level: Up with assistance  Pain:  Patient reports no pain.       OBJECTIVE:   Vital Signs (Most Recent):      Temp: 98 °F (36.7 °C) (11/09/18 0400)  Pulse: 91 (11/09/18 0758)  Resp: 18 (11/09/18 0758)  BP: 136/60 (11/09/18 0600)  SpO2: (!) 94 % (11/09/18 0758)       Vital Signs Range (Last 24H):  Temp:  [97.6 °F (36.4 °C)-99.8 °F (37.7 °C)]   Pulse:  []   Resp:  [12-33]   BP: ()/(50-70)   SpO2:  [88 %-100 %]      Weight: 49.3 kg (108 lb 11 oz)  Body mass index is 19.25 kg/m².    Intake/Output Summary (Last 24 hours) at 11/9/2018 0830  Last data filed at 11/9/2018 0830  Gross per 24 hour   Intake 2350 ml   Output 1760 ml   Net 590 ml     Physical Examination:  Constitutional: She is oriented to person, place, and time. She appears well-developed and well-nourished. No distress.   HENT:   Head: Normocephalic and atraumatic.   Eyes: Pupils are equal, round, and reactive to light.   Neck: Neck supple. No thyromegaly present.   Cardiovascular: Normal rate and regular rhythm. Exam reveals no gallop and no friction rub.   No murmur heard.  Pulmonary/Chest: No respiratory distress. She has wheezes. Left chest tube is in place  Abdominal: Soft. Bowel sounds are normal. She exhibits no distension. There is no tenderness. There is no guarding.   Musculoskeletal: Normal range of motion. She exhibits no edema.   Neurological: She is alert and oriented to person, place, and time.   Skin: Skin is warm and dry. No erythema.   Psychiatric: She has a normal mood and affect.     CBC:  Recent Labs   Lab 11/07/18 0316 11/08/18  0401 11/09/18  0335   WBC 13.20* 9.20 10.60   RBC 3.63* 3.81* 3.67*   HGB 10.9* 11.4* 11.0*   HCT 32.7* 34.5* 33.3*    309 383*   MCV 90 90 91   MCH 30.0 30.0 30.0   MCHC 33.3 33.2 33.0   BMP  Recent Labs   Lab 11/07/18  0316 11/08/18  0401 11/09/18  0335   GLU 86 87 88   * 134* 135*   K 4.1 3.8 4.0   CL 95 92* 95   CO2 32* 32* 31*   BUN 10 6* 6*   CREATININE 0.7 0.6 0.6   CALCIUM 8.6* 8.7 9.0   MG 1.7 1.6 1.9      Diagnostic Results:  Microbiology Results (last 7 days)     Procedure Component Value Units Date/Time    Culture, Respiratory with Gram Stain [[id number]] Collected:  11/08/18 1258    Order Status:  Completed Specimen:  Respiratory from Bronchial Wash Updated:  11/08/18 8297     Gram Stain (Respiratory) Few WBC's     Gram Stain (Respiratory) Rare Gram positive cocci    Culture, Body Fluid  (Aerobic) w/ GS [[id number]] Collected:  10/29/18 1815    Order Status:  Completed Specimen:  Body Fluid from Pleural Fluid Updated:  11/03/18 1039     AEROBIC CULTURE - FLUID No growth     Gram Stain Result Cytospin indicates:      Many WBC's      No organisms seen         CXR: The cardiomediastinal silhouette is normal in appearance.  No pulmonary vascular congestion appreciated. There is dense airspace consolidative change present at the left lung base and there is patchy segmental airspace opacity in the left mid and upper lung zones.  Differential considerations include pneumonia and aspiration.  Underlying pleural fluid cannot be excluded.  An underlying mass would be difficult to exclude.  Serial radiography will be necessary to ensure resolution and exclude an underlying lesion.  There is an age-indeterminate, possibly remote, right posterior 10th rib fracture.    CXR:  Unchanged small left apical pneumothorax. Unchanged moderate left and small right pleural effusions and multifocal airspace disease.    CT chest:  Small left pneumothorax status post preceding left thoracentesis.  Less than 10%.  Moderate left and small right pleural effusions and moderate left lung and mild right lower lobe consolidation/atelectasis.  Enlarged prevascular mediastinal lymph node.  Additional nodular opacities at the cardiac apex which may represent additional prominent lymph nodes, or possibly pleural based nodules.  Moderate emphysema.  A distinct lung mass is not visible however follow-up to resolution of the pulmonary consolidation is recommended to evaluate for on the underlying lung mass, considering the enlarged mediastinal lymph node.  Further evaluation with CT chest with contrast may also be useful if the patient can receive IV contrast. Partially imaged gallbladder demonstrating mural calcification likely in the setting of porcelain gallbladder, which has a reported mildly increased risk of development of gallbladder  malignancy.    CXR: Unchanged small left apical pneumothorax. Unchanged moderate left and small right pleural effusions and multifocal airspace disease.    CXR: Continued dense infiltrate in the left mid and lower lung field with atelectasis and moderate to large left pleural effusion which obscures the heart size.  Small infiltrate at the right lung base and small right pleural effusion as well.    ECHO:  · Left ventricle ejection fraction is at 60%  · No wall motion abnormalities.  · Grade I (mild) left ventricular diastolic dysfunction consistent with impaired relaxation.  · LA pressure is normal.  · Mitral valve is mildly sclerotic  · There is a moderate left pleural effusion.  · Technically very difficult study.    CXR: Increased opacification left hemithorax suggesting atelectasis.    CT chest abdomen and pelvis with and without contrast:  1. Left hilar masslike opacity concerning for primary neoplasm.  Concern for pleural and mediastinal lymph node metastases.  Local anatomic considerations as described.  2. Small complex left pleural effusion with chest tube in place.  Moderate volume right pleural effusion.  3. Emphysema.  4. Porcelain gallbladder.  Associated increased risk of gallbladder malignancy.    RUQ abdominal US:  Calcified gallbladder wall (porcelain gallbladder), which reportedly has a small increased risk of development of gallbladder cancer. Moderately dilated common bile duct, which is nonspecific.  Correlation with obstructive symptomatology and laboratory values recommended.    CXR: Significant improvement in aeration of the left lung.  Left chest tube in place and small bilateral pleural effusions.    Bronchoscopy: After informed consent and time out and sedation by anesthesia- pt had bronchoscopy.  ebl 0 and no complications.  Copious thick secretions noted in left airways.  Washes done to clear completely.  cutlure submitted.    Assessment/Plan:     * Acute respiratory failure with  hypoxia  Large left para-pneumonic effusion s/p thoracentesis  Lung Adenocarcinoma s/p VATS, left pleural biopsy, lung biopsy and left pleurodesis     Close monitoring in ICU. Follow pulmonary and CTS recommendations.   Continue supplemental oxygen as needed.  Nebulizer treatments   Monitor and treat as clinically indicated.  Dr. Knox is following.   Obtain MRI brain for staging.      Tobacco abuse     Smoking cessation counseling performed. Dangers of cigarette smoking were reviewed with patient in detail and patient was encouraged to quit.      Generalized anxiety disorder     Chronic; currently controlled.  Continue home regimen.      Essential hypertension     Chronic; stable.  Continue chronic meds.      Hypothyroidism due to Hashimoto's thyroiditis     Chronic; pt states controlled.  Continue levothyroxine.      Pneumonia of left lower lobe due to infectious organism     Presents with SOB; LLL pneumonia identified on CXR.    Initiate treatment regimen for CAP, which includes a beta lactam and macrolide.   IV Rocephin and IV azithromycin.  Nebulizer treatments     Worsening Liver enzymes - improving  DC IV Rocephin. Trend LFTs. Hepatitis panel is negative and RUQ abdominal US results reviewed.    Discussed with patient's  in detail, he know, patient is very frail and weakness and may not be a good candidate of chemotherapy. We discussed possibility of hospice if needed. Time spent in care of the patient, counseling and coordination of care (Greater than 50% spent in direct face to face contact): 35 min.    DVT prophylaxis: Lovenox 40 mg Sq q day.    Vicki Suarez MD  Department of Hospital Medicine   [facility]

## 2018-11-09 NOTE — PROGRESS NOTES
Pt desat to 85% after eating dinner. Pt asymptomatic. Notified Dr. Suarez. Order for chest x-ray. Notified resp therapist Naomy.

## 2018-11-10 ENCOUNTER — ANESTHESIA (OUTPATIENT)
Dept: ENDOSCOPY | Facility: HOSPITAL | Age: 68
DRG: 166 | End: 2018-11-10
Payer: COMMERCIAL

## 2018-11-10 ENCOUNTER — ANESTHESIA EVENT (OUTPATIENT)
Dept: ENDOSCOPY | Facility: HOSPITAL | Age: 68
DRG: 166 | End: 2018-11-10
Payer: COMMERCIAL

## 2018-11-10 LAB
ALBUMIN SERPL BCP-MCNC: 1.7 G/DL
ALP SERPL-CCNC: 369 U/L
ALT SERPL W/O P-5'-P-CCNC: 161 U/L
ANION GAP SERPL CALC-SCNC: 9 MMOL/L
AST SERPL-CCNC: 56 U/L
BASOPHILS # BLD AUTO: 0 K/UL
BASOPHILS NFR BLD: 0.3 %
BILIRUB SERPL-MCNC: 0.4 MG/DL
BUN SERPL-MCNC: 6 MG/DL
CALCIUM SERPL-MCNC: 8.8 MG/DL
CHLORIDE SERPL-SCNC: 94 MMOL/L
CO2 SERPL-SCNC: 34 MMOL/L
CREAT SERPL-MCNC: 0.6 MG/DL
DIFFERENTIAL METHOD: ABNORMAL
EOSINOPHIL # BLD AUTO: 0.2 K/UL
EOSINOPHIL NFR BLD: 1.4 %
ERYTHROCYTE [DISTWIDTH] IN BLOOD BY AUTOMATED COUNT: 13.8 %
EST. GFR  (AFRICAN AMERICAN): >60 ML/MIN/1.73 M^2
EST. GFR  (NON AFRICAN AMERICAN): >60 ML/MIN/1.73 M^2
GLUCOSE SERPL-MCNC: 100 MG/DL
HCT VFR BLD AUTO: 35.1 %
HGB BLD-MCNC: 11.5 G/DL
LYMPHOCYTES # BLD AUTO: 1.4 K/UL
LYMPHOCYTES NFR BLD: 12.7 %
MAGNESIUM SERPL-MCNC: 1.6 MG/DL
MCH RBC QN AUTO: 29.5 PG
MCHC RBC AUTO-ENTMCNC: 32.7 G/DL
MCV RBC AUTO: 90 FL
MONOCYTES # BLD AUTO: 1.2 K/UL
MONOCYTES NFR BLD: 10.4 %
NEUTROPHILS # BLD AUTO: 8.5 K/UL
NEUTROPHILS NFR BLD: 75.2 %
PHOSPHATE SERPL-MCNC: 3.1 MG/DL
PLATELET # BLD AUTO: 451 K/UL
PMV BLD AUTO: 7.5 FL
POTASSIUM SERPL-SCNC: 3.8 MMOL/L
PROT SERPL-MCNC: 5.3 G/DL
RBC # BLD AUTO: 3.89 M/UL
SODIUM SERPL-SCNC: 137 MMOL/L
WBC # BLD AUTO: 11.3 K/UL

## 2018-11-10 PROCEDURE — 27000221 HC OXYGEN, UP TO 24 HOURS

## 2018-11-10 PROCEDURE — 25000003 PHARM REV CODE 250: Performed by: NURSE PRACTITIONER

## 2018-11-10 PROCEDURE — 25000003 PHARM REV CODE 250: Performed by: INTERNAL MEDICINE

## 2018-11-10 PROCEDURE — 25000003 PHARM REV CODE 250: Performed by: NURSE ANESTHETIST, CERTIFIED REGISTERED

## 2018-11-10 PROCEDURE — 25000242 PHARM REV CODE 250 ALT 637 W/ HCPCS: Performed by: INTERNAL MEDICINE

## 2018-11-10 PROCEDURE — 63600175 PHARM REV CODE 636 W HCPCS: Performed by: NURSE ANESTHETIST, CERTIFIED REGISTERED

## 2018-11-10 PROCEDURE — 94664 DEMO&/EVAL PT USE INHALER: CPT

## 2018-11-10 PROCEDURE — 31645 BRNCHSC W/THER ASPIR 1ST: CPT | Mod: ,,, | Performed by: INTERNAL MEDICINE

## 2018-11-10 PROCEDURE — 84100 ASSAY OF PHOSPHORUS: CPT

## 2018-11-10 PROCEDURE — 36415 COLL VENOUS BLD VENIPUNCTURE: CPT

## 2018-11-10 PROCEDURE — 94640 AIRWAY INHALATION TREATMENT: CPT

## 2018-11-10 PROCEDURE — 63600175 PHARM REV CODE 636 W HCPCS: Performed by: INTERNAL MEDICINE

## 2018-11-10 PROCEDURE — 94799 UNLISTED PULMONARY SVC/PX: CPT

## 2018-11-10 PROCEDURE — 80053 COMPREHEN METABOLIC PANEL: CPT

## 2018-11-10 PROCEDURE — 31622 DX BRONCHOSCOPE/WASH: CPT | Performed by: INTERNAL MEDICINE

## 2018-11-10 PROCEDURE — 20000000 HC ICU ROOM

## 2018-11-10 PROCEDURE — 63600175 PHARM REV CODE 636 W HCPCS: Performed by: THORACIC SURGERY (CARDIOTHORACIC VASCULAR SURGERY)

## 2018-11-10 PROCEDURE — 0B9L8ZX DRAINAGE OF LEFT LUNG, VIA NATURAL OR ARTIFICIAL OPENING ENDOSCOPIC, DIAGNOSTIC: ICD-10-PCS | Performed by: INTERNAL MEDICINE

## 2018-11-10 PROCEDURE — D9220A PRA ANESTHESIA: Mod: ANES,,, | Performed by: ANESTHESIOLOGY

## 2018-11-10 PROCEDURE — 37000008 HC ANESTHESIA 1ST 15 MINUTES: Performed by: INTERNAL MEDICINE

## 2018-11-10 PROCEDURE — 85025 COMPLETE CBC W/AUTO DIFF WBC: CPT

## 2018-11-10 PROCEDURE — 99232 SBSQ HOSP IP/OBS MODERATE 35: CPT | Mod: 25,,, | Performed by: INTERNAL MEDICINE

## 2018-11-10 PROCEDURE — 87205 SMEAR GRAM STAIN: CPT

## 2018-11-10 PROCEDURE — 94761 N-INVAS EAR/PLS OXIMETRY MLT: CPT

## 2018-11-10 PROCEDURE — D9220A PRA ANESTHESIA: Mod: CRNA,,, | Performed by: NURSE ANESTHETIST, CERTIFIED REGISTERED

## 2018-11-10 PROCEDURE — S4991 NICOTINE PATCH NONLEGEND: HCPCS | Performed by: NURSE PRACTITIONER

## 2018-11-10 PROCEDURE — 99900035 HC TECH TIME PER 15 MIN (STAT)

## 2018-11-10 PROCEDURE — 99232 SBSQ HOSP IP/OBS MODERATE 35: CPT | Mod: ,,, | Performed by: INTERNAL MEDICINE

## 2018-11-10 PROCEDURE — 83735 ASSAY OF MAGNESIUM: CPT

## 2018-11-10 PROCEDURE — 87070 CULTURE OTHR SPECIMN AEROBIC: CPT

## 2018-11-10 PROCEDURE — 25000003 PHARM REV CODE 250: Performed by: ANESTHESIOLOGY

## 2018-11-10 RX ORDER — OXYMETAZOLINE HCL 0.05 %
SPRAY, NON-AEROSOL (ML) NASAL
Status: DISCONTINUED
Start: 2018-11-10 | End: 2018-11-14 | Stop reason: HOSPADM

## 2018-11-10 RX ORDER — MIDAZOLAM HYDROCHLORIDE 1 MG/ML
INJECTION, SOLUTION INTRAMUSCULAR; INTRAVENOUS
Status: DISCONTINUED | OUTPATIENT
Start: 2018-11-10 | End: 2018-11-10

## 2018-11-10 RX ORDER — KETAMINE HYDROCHLORIDE 100 MG/ML
INJECTION, SOLUTION INTRAMUSCULAR; INTRAVENOUS
Status: DISCONTINUED | OUTPATIENT
Start: 2018-11-10 | End: 2018-11-10

## 2018-11-10 RX ORDER — LIDOCAINE HYDROCHLORIDE 10 MG/ML
INJECTION, SOLUTION EPIDURAL; INFILTRATION; INTRACAUDAL; PERINEURAL
Status: DISCONTINUED
Start: 2018-11-10 | End: 2018-11-14 | Stop reason: HOSPADM

## 2018-11-10 RX ORDER — GLYCOPYRROLATE 0.2 MG/ML
INJECTION INTRAMUSCULAR; INTRAVENOUS
Status: DISCONTINUED | OUTPATIENT
Start: 2018-11-10 | End: 2018-11-10

## 2018-11-10 RX ORDER — LIDOCAINE HCL/PF 100 MG/5ML
SYRINGE (ML) INTRAVENOUS
Status: DISCONTINUED | OUTPATIENT
Start: 2018-11-10 | End: 2018-11-10

## 2018-11-10 RX ORDER — PREDNISONE 20 MG/1
20 TABLET ORAL ONCE
Status: COMPLETED | OUTPATIENT
Start: 2018-11-10 | End: 2018-11-10

## 2018-11-10 RX ORDER — LIDOCAINE HYDROCHLORIDE 40 MG/ML
4 INJECTION, SOLUTION RETROBULBAR ONCE
Status: COMPLETED | OUTPATIENT
Start: 2018-11-10 | End: 2018-11-10

## 2018-11-10 RX ORDER — IPRATROPIUM BROMIDE AND ALBUTEROL SULFATE 2.5; .5 MG/3ML; MG/3ML
3 SOLUTION RESPIRATORY (INHALATION) EVERY 6 HOURS
Status: DISCONTINUED | OUTPATIENT
Start: 2018-11-10 | End: 2018-11-13

## 2018-11-10 RX ORDER — LIDOCAINE HYDROCHLORIDE 40 MG/ML
INJECTION, SOLUTION RETROBULBAR
Status: DISCONTINUED
Start: 2018-11-10 | End: 2018-11-14 | Stop reason: HOSPADM

## 2018-11-10 RX ORDER — MAGNESIUM SULFATE HEPTAHYDRATE 40 MG/ML
2 INJECTION, SOLUTION INTRAVENOUS ONCE
Status: COMPLETED | OUTPATIENT
Start: 2018-11-10 | End: 2018-11-10

## 2018-11-10 RX ORDER — PROPOFOL 10 MG/ML
VIAL (ML) INTRAVENOUS
Status: DISCONTINUED | OUTPATIENT
Start: 2018-11-10 | End: 2018-11-10

## 2018-11-10 RX ADMIN — OXYCODONE HYDROCHLORIDE AND ACETAMINOPHEN 1 TABLET: 7.5; 325 TABLET ORAL at 04:11

## 2018-11-10 RX ADMIN — GLYCOPYRROLATE 0.2 MG: 0.2 INJECTION, SOLUTION INTRAMUSCULAR; INTRAVENOUS at 08:11

## 2018-11-10 RX ADMIN — PANTOPRAZOLE SODIUM 40 MG: 40 TABLET, DELAYED RELEASE ORAL at 09:11

## 2018-11-10 RX ADMIN — MIDAZOLAM 1 MG: 1 INJECTION INTRAMUSCULAR; INTRAVENOUS at 08:11

## 2018-11-10 RX ADMIN — POLYETHYLENE GLYCOL 3350 17 G: 17 POWDER, FOR SOLUTION ORAL at 09:11

## 2018-11-10 RX ADMIN — FUROSEMIDE 20 MG: 20 TABLET ORAL at 09:11

## 2018-11-10 RX ADMIN — PROPOFOL 30 MG: 10 INJECTION, EMULSION INTRAVENOUS at 08:11

## 2018-11-10 RX ADMIN — LIDOCAINE HYDROCHLORIDE 4 ML: 40 INJECTION, SOLUTION RETROBULBAR; TOPICAL at 07:11

## 2018-11-10 RX ADMIN — LORAZEPAM 1.5 MG: 1 TABLET ORAL at 11:11

## 2018-11-10 RX ADMIN — NICOTINE 1 PATCH: 14 PATCH, EXTENDED RELEASE TRANSDERMAL at 09:11

## 2018-11-10 RX ADMIN — OXYCODONE HYDROCHLORIDE AND ACETAMINOPHEN 1 TABLET: 7.5; 325 TABLET ORAL at 03:11

## 2018-11-10 RX ADMIN — SODIUM CHLORIDE, SODIUM LACTATE, POTASSIUM CHLORIDE, AND CALCIUM CHLORIDE: .6; .31; .03; .02 INJECTION, SOLUTION INTRAVENOUS at 09:11

## 2018-11-10 RX ADMIN — ESCITALOPRAM 20 MG: 10 TABLET, FILM COATED ORAL at 09:11

## 2018-11-10 RX ADMIN — METOPROLOL TARTRATE 50 MG: 50 TABLET ORAL at 09:11

## 2018-11-10 RX ADMIN — AMITRIPTYLINE HYDROCHLORIDE 25 MG: 25 TABLET, FILM COATED ORAL at 09:11

## 2018-11-10 RX ADMIN — IPRATROPIUM BROMIDE AND ALBUTEROL SULFATE 3 ML: .5; 3 SOLUTION RESPIRATORY (INHALATION) at 07:11

## 2018-11-10 RX ADMIN — OXYCODONE HYDROCHLORIDE AND ACETAMINOPHEN 1 TABLET: 7.5; 325 TABLET ORAL at 10:11

## 2018-11-10 RX ADMIN — PREDNISONE 20 MG: 20 TABLET ORAL at 09:11

## 2018-11-10 RX ADMIN — OXYCODONE HYDROCHLORIDE AND ACETAMINOPHEN 1 TABLET: 7.5; 325 TABLET ORAL at 09:11

## 2018-11-10 RX ADMIN — KETAMINE HYDROCHLORIDE 20 MG: 100 INJECTION, SOLUTION, CONCENTRATE INTRAMUSCULAR; INTRAVENOUS at 08:11

## 2018-11-10 RX ADMIN — LIDOCAINE HYDROCHLORIDE 50 MG: 20 INJECTION, SOLUTION INTRAVENOUS at 08:11

## 2018-11-10 RX ADMIN — IPRATROPIUM BROMIDE AND ALBUTEROL SULFATE 3 ML: .5; 3 SOLUTION RESPIRATORY (INHALATION) at 12:11

## 2018-11-10 RX ADMIN — ENOXAPARIN SODIUM 40 MG: 100 INJECTION SUBCUTANEOUS at 05:11

## 2018-11-10 RX ADMIN — MAGNESIUM SULFATE HEPTAHYDRATE 2 G: 40 INJECTION, SOLUTION INTRAVENOUS at 05:11

## 2018-11-10 NOTE — PLAN OF CARE
Problem: Patient Care Overview  Goal: Plan of Care Review  Outcome: Ongoing (interventions implemented as appropriate)  Pt remains in ICU with left chest tube, 50 ml of serosanguinous output this shift. Pt up to chair for 1 1/2 hr. Encouraged to do I.S. frequently, getting up to 500 ml. O2 sat dropped from 99%-to 85% after sitting up to eat dinner. Dr. Abdi plans to do bronch tomorrow at 0800. Pt remains confused and forgetful. Afebrile this shift. Safety maintained.

## 2018-11-10 NOTE — PROGRESS NOTES
Subjective:  Patient is anxious for chest tube removal.  Breathing appears improved.  She is actively using incentive spirometer.  No other new complaints for pertinent findings on a 14 point review of systems.    Objective:  T-max 98.8°  Blood pressure 115/56  Pulse 101  Respirations 23  Intake/output 2645/1605/stool x1    Microbiology:  Sputum culture remarkable only for normal respiratory christianne and few white blood cells.    Laboratory:  White count 11.3, hemoglobin 11.5, hematocrit 35.1, platelets 451.  Sodium 137, potassium 3.8, chloride 94, CO2 34, BUN 6, creatinine 0.6, glucose 100, calcium 8.8, phosphorus 3.1, magnesium 1.6, alkaline phosphatase 369, total protein 5.3, albumin 1.7, total bilirubin 0.4, AST 56, , GFR is greater than 60.    Chest x-ray reveals left chest tube in appropriate location.  There has been slight decrease in density involving the left hemithorax compared to the prior study.  There is a stable right lower lobe infiltrate/effusion.    Physical examination:  Well-developed, elderly, frail appearing, white female, in no acute distress, who is alert and oriented x4.  HEENT: Normocephalic, atraumatic. Oral mucosa pink and moist. Lips without   lesions. Tongue midline. Oropharynx clear. Nonicteric sclerae.   NECK: Supple, no adenopathy. No carotid bruits, thyromegaly or thyroid nodule.   HEART: Regular rate and rhythm without murmur, gallop or rub.   LUNGS:  Decreased breath sounds in lower lung fields bilaterally. Normal respiratory effort.   ABDOMEN: Soft, nontender, nondistended with positive normoactive bowel sounds,   no hepatosplenomegaly.   EXTREMITIES: No cyanosis, clubbing or edema. Distal pulses are intact.   AXILLAE AND GROIN: No palpable pathologic lymphadenopathy is appreciated.   SKIN: Intact/turgor normal. Left chest tube remains intact and free from signs of infection or hemorrhage.    NEUROLOGIC:  No focal deficits are appreciated.  :  De [place]a catheter  intact/functioning.      Impression:  1.  Unresectable non-small cell carcinoma lung.  2.  Mild anemia.    Plan:  1.  Continue ongoing postoperative care.  2.  Patient will likely require systemic chemotherapy with her without XRT once an outpatient.  Selection of specific treatment regimen will be deferred to Dr. Knox.

## 2018-11-10 NOTE — PROGRESS NOTES
Progress Note  Hospital Medicine  Patient Name:Bertha Kelly  MRN:  [id number]  Patient Class: IP- Inpatient  Admit Date: 10/26/2018  Length of Stay: 14 days  Expected Discharge Date:   Attending Physician: Vicki Suarez MD  Primary Care Provider:  James Colon MD    SUBJECTIVE:     Principal Problem: Pleural effusion  Initial history of present illness: Bertha Kelly is a 69 y/o female with a PMHx of HTN and thyroid disease who presented to the ED today with c/o SOB which began 3 weeks ago and has progressively gotten worse.  Pt states that she becomes SOB with activity.  She reports that she received a pneumonia vaccine at her doctor's office about a month ago and began having some chest congestion and cough a few days later.  She is a current smoker of about 1/2 pack daily.  Reports increased fatigue and chills, but denies fever, chest pain, N/V, and dysuria.  Reports weight loss of a few pounds in the past 3 weeks due to poor appetite, but states that prior to this illness, weight was stable and she had a very good appetite.  Pt's CXR reveals a LLL pneumonia and was noted to be mildly hypoxic initially requiring supplemental oxygen.  She will be admitted to the service of hospital medicine for further treatment.    PMH/PSH/SH/FH/Meds: reviewed.    Symptoms/Review of Systems: In ICU s/p VATS, left pleural biopsy, lung biopsy and left pleurodesis.  Patient had bronchoscopy repeated today. Feels better. MRI brain not suggestive of mets. Patient frail and weak and not thriving.  Diet: Cardiac  Activity level: Up with assistance  Pain:  Patient reports no pain.       OBJECTIVE:   Vital Signs (Most Recent):      Temp: 98.6 °F (37 °C) (11/10/18 1145)  Pulse: 101 (11/10/18 1240)  Resp: 18 (11/10/18 1240)  BP: 104/75 (11/10/18 1200)  SpO2: (!) 90 % (11/10/18 1240)       Vital Signs Range (Last 24H):  Temp:  [98 °F (36.7 °C)-98.8 °F (37.1 °C)]   Pulse:  []   Resp:  [12-31]   BP: ()/(50-75)   SpO2:  [85  %-100 %]     Weight: 50.4 kg (111 lb 1.8 oz)  Body mass index is 19.68 kg/m².    Intake/Output Summary (Last 24 hours) at 11/10/2018 1306  Last data filed at 11/10/2018 1200  Gross per 24 hour   Intake 3595 ml   Output 1630 ml   Net 1965 ml     Physical Examination:  Constitutional: She is oriented to person, place, and time. She appears well-developed and well-nourished. No distress.   HENT:   Head: Normocephalic and atraumatic.   Eyes: Pupils are equal, round, and reactive to light.   Neck: Neck supple. No thyromegaly present.   Cardiovascular: Normal rate and regular rhythm. Exam reveals no gallop and no friction rub.   No murmur heard.  Pulmonary/Chest: No respiratory distress. She has wheezes. Left chest tube is in place  Abdominal: Soft. Bowel sounds are normal. She exhibits no distension. There is no tenderness. There is no guarding.   Musculoskeletal: Normal range of motion. She exhibits no edema.   Neurological: She is alert and oriented to person, place, and time.   Skin: Skin is warm and dry. No erythema.   Psychiatric: She has a normal mood and affect.     CBC:  Recent Labs   Lab 11/08/18  0401 11/09/18 0335 11/10/18  0423   WBC 9.20 10.60 11.30   RBC 3.81* 3.67* 3.89*   HGB 11.4* 11.0* 11.5*   HCT 34.5* 33.3* 35.1*    383* 451*   MCV 90 91 90   MCH 30.0 30.0 29.5   MCHC 33.2 33.0 32.7   BMP  Recent Labs   Lab 11/08/18  0401 11/09/18  0335 11/10/18  0423   GLU 87 88 100   * 135* 137   K 3.8 4.0 3.8   CL 92* 95 94*   CO2 32* 31* 34*   BUN 6* 6* 6*   CREATININE 0.6 0.6 0.6   CALCIUM 8.7 9.0 8.8   MG 1.6 1.9 1.6      Diagnostic Results:  Microbiology Results (last 7 days)     Procedure Component Value Units Date/Time    Culture, Respiratory with Gram Stain [[id number]] Collected:  11/10/18 0815    Order Status:  Sent Specimen:  Respiratory from Bronchial Wash Updated:  11/10/18 0815    Culture, Respiratory with Gram Stain [[id number]] Collected:  11/08/18 1258    Order Status:  Completed  Specimen:  Respiratory from Bronchial Wash Updated:  11/10/18 0748     Respiratory Culture Normal respiratory christianne     Gram Stain (Respiratory) Few WBC's     Gram Stain (Respiratory) Rare Gram positive cocci         MRI Brain  Impression:       1. There is no intracranial hemorrhage or mass.  There is no pathologic enhancement in the brain.  2. Brain volume is normal.  There is no hydrocephalus or midline shift.  3. There is mild nonspecific and age indeterminate periventricular and subcortical white matter disease.  These findings likely reflect sequelae of chronic small vessel ischemic change.  Additionally, there are few small regions of high signal intensity on the diffusion sequence in the left frontal and parietal white matter.  These regions are also FLAIR and T2 hyperintense which is somewhat nonspecific but may reflect some degree of T2 shine through and/or subacute ischemic change.  There is no large vascular territory infarction.         CXR: The cardiomediastinal silhouette is normal in appearance.  No pulmonary vascular congestion appreciated. There is dense airspace consolidative change present at the left lung base and there is patchy segmental airspace opacity in the left mid and upper lung zones.  Differential considerations include pneumonia and aspiration.  Underlying pleural fluid cannot be excluded.  An underlying mass would be difficult to exclude.  Serial radiography will be necessary to ensure resolution and exclude an underlying lesion.  There is an age-indeterminate, possibly remote, right posterior 10th rib fracture.    CXR:  Unchanged small left apical pneumothorax. Unchanged moderate left and small right pleural effusions and multifocal airspace disease.    CT chest:  Small left pneumothorax status post preceding left thoracentesis.  Less than 10%.  Moderate left and small right pleural effusions and moderate left lung and mild right lower lobe consolidation/atelectasis.  Enlarged  prevascular mediastinal lymph node.  Additional nodular opacities at the cardiac apex which may represent additional prominent lymph nodes, or possibly pleural based nodules.  Moderate emphysema.  A distinct lung mass is not visible however follow-up to resolution of the pulmonary consolidation is recommended to evaluate for on the underlying lung mass, considering the enlarged mediastinal lymph node.  Further evaluation with CT chest with contrast may also be useful if the patient can receive IV contrast. 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    CXR: Unchanged small left apical pneumothorax. Unchanged moderate left and small right pleural effusions and multifocal airspace disease.    CXR: Continued dense infiltrate in the left mid and lower lung field with atelectasis and moderate to large left pleural effusion which obscures the heart size.  Small infiltrate at the right lung base and small right pleural effusion as well.    ECHO:  · Left ventricle ejection fraction is at 60%  · No wall motion abnormalities.  · Grade I (mild) left ventricular diastolic dysfunction consistent with impaired relaxation.  · LA pressure is normal.  · Mitral valve is mildly sclerotic  · There is a moderate left pleural effusion.  · Technically very difficult study.    CXR: Increased opacification left hemithorax suggesting atelectasis.    CT chest abdomen and pelvis with and without contrast:  1. Left hilar masslike opacity concerning for primary neoplasm.  Concern for pleural and mediastinal lymph node metastases.  Local anatomic considerations as described.  2. Small complex left pleural effusion with chest tube in place.  Moderate volume right pleural effusion.  3. Emphysema.  4. Porcelain gallbladder.  Associated increased risk of gallbladder malignancy.    RUQ abdominal  US:  Calcified gallbladder wall (porcelain gallbladder), which reportedly has a small increased risk of development of gallbladder cancer. Moderately dilated common bile duct, which is nonspecific.  Correlation with obstructive symptomatology and laboratory values recommended.    CXR: Significant improvement in aeration of the left lung.  Left chest tube in place and small bilateral pleural effusions.    Bronchoscopy: After informed consent and time out and sedation by anesthesia- pt had bronchoscopy.  ebl 0 and no complications.  Copious thick secretions noted in left airways.  Washes done to clear completely.  cutlure submitted.    Assessment/Plan:     * Acute respiratory failure with hypoxia  Large left para-pneumonic effusion s/p thoracentesis  Lung Adenocarcinoma s/p VATS, left pleural biopsy, lung biopsy and left pleurodesis     Close monitoring in ICU. Follow pulmonary and CTS recommendations.   Continue supplemental oxygen as needed.  Nebulizer treatments   Monitor and treat as clinically indicated.  Dr. Knox is following.       Tobacco abuse     Smoking cessation counseling performed. Dangers of cigarette smoking were reviewed with patient in detail and patient was encouraged to quit.      Generalized anxiety disorder     Chronic; currently controlled.  Continue home regimen.      Essential hypertension     Chronic; stable.  Continue chronic meds.      Hypothyroidism due to Hashimoto's thyroiditis     Chronic; pt states controlled.  Continue levothyroxine.      Pneumonia of left lower lobe due to infectious organism     Presents with SOB; LLL pneumonia identified on CXR.    Initiate treatment regimen for CAP, which includes a beta lactam and macrolide.   IV Rocephin and IV azithromycin.  Nebulizer treatments     Worsening Liver enzymes - improving  DC IV Rocephin. Trend LFTs. Hepatitis panel is negative and RUQ abdominal US results reviewed.    Time spent in care of the patient, counseling and coordination  of care (Greater than 50% spent in direct face to face contact): 15 min.    DVT prophylaxis: Lovenox 40 mg Sq q day.    Elvin Babcock MD  Department of Hospital Medicine   [facility]

## 2018-11-10 NOTE — ANESTHESIA PREPROCEDURE EVALUATION
11/10/2018  Bertha Kelly is a 68 y.o., female.    Pre-op Assessment    I have reviewed the Patient Summary Reports.     I have reviewed the Nursing Notes.   I have reviewed the Medications.     Review of Systems  Anesthesia Hx:  No problems with previous Anesthesia    Cardiovascular:   Hypertension, well controlled    Pulmonary:   Pneumonia Lung mass - S/P VATS. Stable on nasal O2   Endocrine:   Hypothyroidism        Physical Exam  General:  Well nourished    Airway/Jaw/Neck:  Airway Findings: Mouth Opening: Normal Tongue: Normal  General Airway Assessment: Adult, Good  Mallampati: II  Improves to II with phonation.  TM Distance: 4-6 cm      Dental:  Dental Findings: In tact   Chest/Lungs:  Chest/Lungs Findings: Decreased Breath Sounds Bilateral     Heart/Vascular:  Heart Findings: Rate: Normal  Rhythm: Regular Rhythm  Sounds: Normal  Heart murmur: negative       Mental Status:  Mental Status Findings:  Cooperative, Alert and Oriented         Anesthesia Plan  Type of Anesthesia, risks & benefits discussed:  Anesthesia Type:  general  Patient's Preference:   Intra-op Monitoring Plan: standard ASA monitors  Intra-op Monitoring Plan Comments:   Post Op Pain Control Plan:   Post Op Pain Control Plan Comments:   Induction:   IV  Beta Blocker:  Patient is on a Beta-Blocker and has received one dose within the past 24 hours (No further documentation required).       Informed Consent: Patient understands risks and agrees with Anesthesia plan.  Questions answered. Anesthesia consent signed with patient.  ASA Score: 3  emergent   Day of Surgery Review of History & Physical: I have interviewed and examined the patient. I have reviewed the patient's H&P dated:  There are no significant changes.  H&P update referred to the surgeon.         Ready For Surgery From Anesthesia Perspective.

## 2018-11-10 NOTE — PROGRESS NOTES
Notified Dr. Abdi of O2 desaturation from 99% to 85% on 3 L nasal cannula and new chest x-ray results. Pt on non-re-breather, O2 sat 96%. He states he will do another bronch at 8 AM tomorrow morning unless there is a change throughout the night.

## 2018-11-10 NOTE — PLAN OF CARE
Problem: Patient Care Overview  Goal: Plan of Care Review  Outcome: Ongoing (interventions implemented as appropriate)  Left chest tube with 80cc of serosanguinous drainage this shift. Patient encouraged to do IS while awake. Oxygen saturation improved and patient was transitioned to NC at 3L early in the evening. Plan for bronch today at 0800; pt remains NPO. Pt continues to be forgetful. UOP marginal. Patient again dropped sats with her bath this morning, but has been slowly recovering; pt has very minimal reserves.

## 2018-11-10 NOTE — PROGRESS NOTES
Called for low 02 sats on patient when I arrived to room her sat was 88% on 6 lpm. Placed on non rebreather wiith sats rising to 97%. Xray ordered and Dr Abdi called. He will bronch the patient in the AM.

## 2018-11-10 NOTE — TRANSFER OF CARE
"Anesthesia Transfer of Care Note    Patient: Bertha Kelly    Procedure(s) Performed: Procedure(s) (LRB):  BRONCHOSCOPY (Left)    Patient location: ICU    Anesthesia Type: general    Transport from OR: Transported from OR on 6-10 L/min O2 by face mask with adequate spontaneous ventilation    Post pain: adequate analgesia    Post assessment: tolerated procedure well and no apparent anesthetic complications    Post vital signs: stable    Level of consciousness: awake, alert and oriented    Nausea/Vomiting: no nausea/vomiting    Complications: none    Transfer of care protocol was followed      Last vitals:   Visit Vitals  BP (!) 111/51   Pulse 90   Temp 36.7 °C (98 °F) (Oral)   Resp (!) 21   Ht 5' 3" (1.6 m)   Wt 50.4 kg (111 lb 1.8 oz)   LMP  (LMP Unknown)   SpO2 (!) 90%   Breastfeeding? No   BMI 19.68 kg/m²     "

## 2018-11-10 NOTE — PROGRESS NOTES
Patient's  expressed desire to speak with Dr. Knox regarding cancer diagnosis and possible treatment vs palliative care and hospice options. Will pass on to oncoming shift.

## 2018-11-10 NOTE — PROCEDURES
After informed consent, time out, sedation by anesthesia- pt had bronchsocpy to clear retained secretions.  Larynx and tracheobronchial tree were remarkable for excess thick sl pale secretions.  No blood loss nor complications.  Culture from wash submitted.

## 2018-11-10 NOTE — PROGRESS NOTES
After informed consent, time out, sedation by anesthesia- pt had bronchsocpy to clear retained secretions.  Larynx and tracheobronchial tree were remarkable for excess thick sl pale secretions.  No blood loss nor complications.  Culture from wash submitted.  Progress Note  PULMONARY    Admit Date: 10/26/2018   11/10/2018      SUBJECTIVE:     Oct 30, breathing better, some appetite.  Oct 31, no c/o, tried pull out lines last pm, denies sob.  Nov 1, no c/o, says breathing and eating ok?  Nov 5, post pleurodesis. Confused.  No c/o  Nov 6, less confused.  No c/o  Nov 7, pleasant. No c/o.  Nov 8 , no c/0  Nov 9, same, no c/o  Nov 10, no c/o- needed 100% 02 again with cxr vol loss      PFSH and Allergies reviewed.    OBJECTIVE:     Vitals (Most recent):  Vitals:    11/10/18 0818   BP: (!) 165/66   Pulse: 105   Resp: 20   Temp:        Vitals (24 hour range):  Temp:  [98 °F (36.7 °C)-98.8 °F (37.1 °C)]   Pulse:  []   Resp:  [12-25]   BP: ()/(50-71)   SpO2:  [85 %-99 %]       Intake/Output Summary (Last 24 hours) at 11/10/2018 0819  Last data filed at 11/10/2018 0730  Gross per 24 hour   Intake 2395 ml   Output 1770 ml   Net 625 ml          Physical Exam:  The patient's neuro status (alertness,orientation,cognitive function,motor skills,), pharyngeal exam (oral lesions, hygiene, abn dentition,), Neck (jvd,mass,thyroid,nodes in neck and above/below clavicle),RESPIRATORY(symmetry,effort,fremitus,percussion,auscultation),  Cor(rhythm,heart tones including gallops,perfusion,edema)ABD(distention,hepatic&splenomegaly,tenderness,masses), Skin(rash,cyanosis),Psyc(affect,judgement,).  Exam negative except for these pertinent findings:    Alert, confuse, left chest tube, decreased bs  And dull left ant chest, no distress.    Radiographs reviewed: view by direct vision  cxr with reasonable aeration left lung now lost.    Results for orders placed during the hospital encounter of 10/26/18   X-Ray Chest 1 View    Narrative  EXAMINATION:  XR CHEST 1 VIEW    CLINICAL HISTORY:  post thoracentesis;    TECHNIQUE:  Single frontal view of the chest was performed.    COMPARISON:  Chest radiograph 10/29/2018; CT dated 10/30/2018    FINDINGS:  Bibasilar airspace disease.  Normal size heart. Indistinct pulmonary vasculature. Blunted costophrenic angles.  Left apical pneumothorax.      Impression 1. Post thoracentesis with reduction in size of left pleural effusion and development of apical pneumothorax.  This is more conspicuous on subsequent CT.  2. Small right pleural effusion.  3. Bibasilar airspace disease which could reflect atelectasis, pneumonia, edema.      Electronically signed by: Demar Ann  Date:    10/30/2018  Time:    10:33   ]    Labs     Recent Labs   Lab 11/10/18  0423   WBC 11.30   HGB 11.5*   HCT 35.1*   *   Electronically reviewed and signed by:   Ursula Chow MD   Signed on 10/30/18 at 13:20   Pathologist review of pleural fluid cell count:   The cytospin shows a predominance of mature appearing lymphocytes   with rare neutrophils and eosinophils seen in the background as well   as an occasional atypical mesothelial cell, favor reactive.     Correlate clinically.  Recent Labs   Lab 11/10/18  0423      K 3.8   CL 94*   CO2 34*   BUN 6*   CREATININE 0.6      CALCIUM 8.8   MG 1.6   PHOS 3.1   AST 56*   *   ALKPHOS 369*   BILITOT 0.4   PROT 5.3*   ALBUMIN 1.7*   No results for input(s): PH, PCO2, PO2, HCO3 in the last 24 hours.  Microbiology Results (last 7 days)     Procedure Component Value Units Date/Time    Culture, Respiratory with Gram Stain [[id number]] Collected:  11/10/18 0815    Order Status:  Sent Specimen:  Respiratory from Bronchial Wash Updated:  11/10/18 0815    Culture, Respiratory with Gram Stain [[id number]] Collected:  11/08/18 1258    Order Status:  Completed Specimen:  Respiratory from Bronchial Wash Updated:  11/10/18 0748     Respiratory Culture Normal respiratory christianne      Gram Stain (Respiratory) Few WBC's     Gram Stain (Respiratory) Rare Gram positive cocci    Culture, Body Fluid (Aerobic) w/ GS [[id number]] Collected:  10/29/18 1815    Order Status:  Completed Specimen:  Body Fluid from Pleural Fluid Updated:  11/03/18 1039     AEROBIC CULTURE - FLUID No growth     Gram Stain Result Cytospin indicates:      Many WBC's      No organisms seen        Impression       1. There is no intracranial hemorrhage or mass.  There is no pathologic enhancement in the brain.  2. Brain volume is normal.  There is no hydrocephalus or midline shift.  3. There is mild nonspecific and age indeterminate periventricular and subcortical white matter disease.  These findings likely reflect sequelae of chronic small vessel ischemic change.  Additionally, there are few small regions of high signal intensity on the diffusion sequence in the left frontal and parietal white matter.  These regions are also FLAIR and T2 hyperintense which is somewhat nonspecific but may reflect some degree of T2 shine through and/or subacute ischemic change.  There is no large vascular territory infarction.  This report was flagged in Epic as abnormal.      Electronically signed by: Tuan Finn MD  Date: 11/09/2018  Time: 11:13       Impression:  Active Hospital Problems    Diagnosis  POA    *Pleural effusion [J90]  Yes    Atelectasis [J98.11]  Yes    Abnormal CXR [R93.89]  Yes    Moderate malnutrition [E44.0]  Yes    Wheeze [R06.2]  Yes    Anorexia [R63.0]  Yes    SOB (shortness of breath) [R06.02]  Yes    Pneumonia of left lower lobe due to infectious organism [J18.1]  Yes    Hypothyroidism due to Hashimoto's thyroiditis [E03.8, E06.3]  Yes    Essential hypertension [I10]  Yes    Generalized anxiety disorder [F41.1]  Yes    Acute respiratory failure with hypoxia [J96.01]  Yes    Tobacco abuse [Z72.0]  Yes      Resolved Hospital Problems   No resolved problems to display.               Plan:     Oct 30,  fluid was lymph predominate exudate.  Cancer concerning.  F/u cxr am - if fluid building up would recommend thoracoscopic procedure with Dr Jones.  Cytology pending.  procalcitonin was very low.  Check d dimer and bnp am.  Discussed with pt and family.  Oct 31, bnp up, cytospin cells not felt malignant - cytology pending.  D dimer good.  cxr left effusion increasing. Dose lasix, check echo.  May need Dr Jones for procedure?  F/u cxr am to decide.    Nov 1, echo na, cxr worsening - fluid rapidly returning.  S. Maltophilia in sputum but procalcitonin was low.  Cytology na.  Discussed with Dr Suarez.      Pt maybe a little better but is non ambulatory and frail ( too frail to do therapy).  Will dose lasix 20, ask Dr Jones to see.  Pt wanting to go home????    lft sl up and co2 rising.    Nov 5, cytology with adenoca c/w lung. Post pleurodysis now.     Consider oncology consult to facilitate disposition.  Pt not thriving.    Nov 6, min confused, looks to be progressing, needs mobilize.      Nov 7, lft up post op with ast 449, otherwise same.  Looks frail.  Nov 8 left chest opacified with vol loss, bronch asap to clear out.  Pt not thriving.  Nov 9,   cxr with left lung aerated, pt not thriving. Discussed with .  [place] not good.     Nov 10, needed another bronch , cultures negative from last, will do nt suction, dose prednisone x1 (decreases pleurodesis success), nt suction and f/u cxr.  If pt cannot clear secretions , she will not survive into future.    Mri neg for mets but has ischemic dz.        .

## 2018-11-10 NOTE — ANESTHESIA POSTPROCEDURE EVALUATION
"Anesthesia Post Evaluation    Patient: Bertha Kelly    Procedure(s) Performed: Procedure(s) (LRB):  BRONCHOSCOPY (Left)    Final Anesthesia Type: general  Patient location during evaluation: PACU  Patient participation: Yes- Able to Participate  Level of consciousness: awake and alert and oriented  Post-procedure vital signs: reviewed and stable  Pain management: adequate  Airway patency: patent  PONV status at discharge: No PONV  Anesthetic complications: no      Cardiovascular status: blood pressure returned to baseline  Respiratory status: unassisted, spontaneous ventilation and room air  Hydration status: euvolemic  Follow-up not needed.        Visit Vitals  BP (!) 125/58   Pulse (!) 116   Temp 36.7 °C (98 °F) (Oral)   Resp 19   Ht 5' 3" (1.6 m)   Wt 50.4 kg (111 lb 1.8 oz)   LMP  (LMP Unknown)   SpO2 97%   Breastfeeding? No   BMI 19.68 kg/m²       Pain/Heather Score: Pain Assessment Performed: Yes (11/10/2018  8:20 AM)  Presence of Pain: denies (11/10/2018  8:30 AM)  Pain Rating Prior to Med Admin: 4 (11/10/2018  4:06 AM)  Pain Rating Post Med Admin: 0 (11/9/2018  9:11 PM)  Heather Score: 10 (11/10/2018  8:30 AM)        "

## 2018-11-11 LAB
ALBUMIN SERPL BCP-MCNC: 1.6 G/DL
ALP SERPL-CCNC: 297 U/L
ALT SERPL W/O P-5'-P-CCNC: 104 U/L
ANION GAP SERPL CALC-SCNC: 8 MMOL/L
AST SERPL-CCNC: 38 U/L
BASOPHILS # BLD AUTO: 0 K/UL
BASOPHILS NFR BLD: 0.3 %
BILIRUB SERPL-MCNC: 0.2 MG/DL
BUN SERPL-MCNC: 6 MG/DL
CALCIUM SERPL-MCNC: 8.4 MG/DL
CHLORIDE SERPL-SCNC: 97 MMOL/L
CO2 SERPL-SCNC: 34 MMOL/L
CREAT SERPL-MCNC: 0.6 MG/DL
DIFFERENTIAL METHOD: ABNORMAL
EOSINOPHIL # BLD AUTO: 0 K/UL
EOSINOPHIL NFR BLD: 0 %
ERYTHROCYTE [DISTWIDTH] IN BLOOD BY AUTOMATED COUNT: 13.6 %
EST. GFR  (AFRICAN AMERICAN): >60 ML/MIN/1.73 M^2
EST. GFR  (NON AFRICAN AMERICAN): >60 ML/MIN/1.73 M^2
GLUCOSE SERPL-MCNC: 146 MG/DL
HCT VFR BLD AUTO: 31.3 %
HGB BLD-MCNC: 10.3 G/DL
LYMPHOCYTES # BLD AUTO: 0.9 K/UL
LYMPHOCYTES NFR BLD: 7 %
MAGNESIUM SERPL-MCNC: 1.9 MG/DL
MCH RBC QN AUTO: 29.7 PG
MCHC RBC AUTO-ENTMCNC: 32.8 G/DL
MCV RBC AUTO: 91 FL
MONOCYTES # BLD AUTO: 1 K/UL
MONOCYTES NFR BLD: 7.8 %
NEUTROPHILS # BLD AUTO: 10.4 K/UL
NEUTROPHILS NFR BLD: 84.9 %
PHOSPHATE SERPL-MCNC: 2.6 MG/DL
PLATELET # BLD AUTO: 479 K/UL
PMV BLD AUTO: 8.1 FL
POTASSIUM SERPL-SCNC: 4 MMOL/L
PROT SERPL-MCNC: 4.9 G/DL
RBC # BLD AUTO: 3.46 M/UL
SODIUM SERPL-SCNC: 139 MMOL/L
WBC # BLD AUTO: 12.3 K/UL

## 2018-11-11 PROCEDURE — 99233 SBSQ HOSP IP/OBS HIGH 50: CPT | Mod: ,,, | Performed by: INTERNAL MEDICINE

## 2018-11-11 PROCEDURE — 20000000 HC ICU ROOM

## 2018-11-11 PROCEDURE — 94761 N-INVAS EAR/PLS OXIMETRY MLT: CPT

## 2018-11-11 PROCEDURE — S4991 NICOTINE PATCH NONLEGEND: HCPCS | Performed by: NURSE PRACTITIONER

## 2018-11-11 PROCEDURE — 99232 SBSQ HOSP IP/OBS MODERATE 35: CPT | Mod: ,,, | Performed by: INTERNAL MEDICINE

## 2018-11-11 PROCEDURE — 36415 COLL VENOUS BLD VENIPUNCTURE: CPT

## 2018-11-11 PROCEDURE — 84100 ASSAY OF PHOSPHORUS: CPT

## 2018-11-11 PROCEDURE — 25000003 PHARM REV CODE 250: Performed by: ANESTHESIOLOGY

## 2018-11-11 PROCEDURE — 83735 ASSAY OF MAGNESIUM: CPT

## 2018-11-11 PROCEDURE — 25000003 PHARM REV CODE 250: Performed by: INTERNAL MEDICINE

## 2018-11-11 PROCEDURE — 94799 UNLISTED PULMONARY SVC/PX: CPT

## 2018-11-11 PROCEDURE — 25000242 PHARM REV CODE 250 ALT 637 W/ HCPCS: Performed by: INTERNAL MEDICINE

## 2018-11-11 PROCEDURE — 27000221 HC OXYGEN, UP TO 24 HOURS

## 2018-11-11 PROCEDURE — 63600175 PHARM REV CODE 636 W HCPCS: Performed by: INTERNAL MEDICINE

## 2018-11-11 PROCEDURE — 80053 COMPREHEN METABOLIC PANEL: CPT

## 2018-11-11 PROCEDURE — 85025 COMPLETE CBC W/AUTO DIFF WBC: CPT

## 2018-11-11 PROCEDURE — 94664 DEMO&/EVAL PT USE INHALER: CPT

## 2018-11-11 PROCEDURE — 92610 EVALUATE SWALLOWING FUNCTION: CPT

## 2018-11-11 PROCEDURE — 25000003 PHARM REV CODE 250: Performed by: NURSE PRACTITIONER

## 2018-11-11 PROCEDURE — 99900035 HC TECH TIME PER 15 MIN (STAT)

## 2018-11-11 PROCEDURE — 94640 AIRWAY INHALATION TREATMENT: CPT

## 2018-11-11 RX ORDER — SODIUM,POTASSIUM PHOSPHATES 280-250MG
1 POWDER IN PACKET (EA) ORAL
Status: DISCONTINUED | OUTPATIENT
Start: 2018-11-11 | End: 2018-11-14 | Stop reason: HOSPADM

## 2018-11-11 RX ADMIN — POLYETHYLENE GLYCOL 3350 17 G: 17 POWDER, FOR SOLUTION ORAL at 10:11

## 2018-11-11 RX ADMIN — OXYCODONE HYDROCHLORIDE AND ACETAMINOPHEN 1 TABLET: 7.5; 325 TABLET ORAL at 11:11

## 2018-11-11 RX ADMIN — METOPROLOL TARTRATE 50 MG: 50 TABLET ORAL at 09:11

## 2018-11-11 RX ADMIN — SODIUM CHLORIDE, SODIUM LACTATE, POTASSIUM CHLORIDE, AND CALCIUM CHLORIDE: .6; .31; .03; .02 INJECTION, SOLUTION INTRAVENOUS at 05:11

## 2018-11-11 RX ADMIN — OXYCODONE HYDROCHLORIDE AND ACETAMINOPHEN 1 TABLET: 7.5; 325 TABLET ORAL at 09:11

## 2018-11-11 RX ADMIN — IPRATROPIUM BROMIDE AND ALBUTEROL SULFATE 3 ML: .5; 3 SOLUTION RESPIRATORY (INHALATION) at 07:11

## 2018-11-11 RX ADMIN — POLYETHYLENE GLYCOL 3350 17 G: 17 POWDER, FOR SOLUTION ORAL at 09:11

## 2018-11-11 RX ADMIN — LORAZEPAM 1.5 MG: 1 TABLET ORAL at 01:11

## 2018-11-11 RX ADMIN — LEVOTHYROXINE SODIUM 50 MCG: 50 TABLET ORAL at 05:11

## 2018-11-11 RX ADMIN — ENOXAPARIN SODIUM 40 MG: 100 INJECTION SUBCUTANEOUS at 04:11

## 2018-11-11 RX ADMIN — METOPROLOL TARTRATE 50 MG: 50 TABLET ORAL at 10:11

## 2018-11-11 RX ADMIN — POTASSIUM & SODIUM PHOSPHATES POWDER PACK 280-160-250 MG 1 PACKET: 280-160-250 PACK at 04:11

## 2018-11-11 RX ADMIN — LORAZEPAM 1.5 MG: 1 TABLET ORAL at 10:11

## 2018-11-11 RX ADMIN — IPRATROPIUM BROMIDE AND ALBUTEROL SULFATE 3 ML: .5; 3 SOLUTION RESPIRATORY (INHALATION) at 01:11

## 2018-11-11 RX ADMIN — ESCITALOPRAM 20 MG: 10 TABLET, FILM COATED ORAL at 10:11

## 2018-11-11 RX ADMIN — POTASSIUM & SODIUM PHOSPHATES POWDER PACK 280-160-250 MG 1 PACKET: 280-160-250 PACK at 09:11

## 2018-11-11 RX ADMIN — METHYLPREDNISOLONE SODIUM SUCCINATE 20 MG: 40 INJECTION, POWDER, FOR SOLUTION INTRAMUSCULAR; INTRAVENOUS at 10:11

## 2018-11-11 RX ADMIN — NICOTINE 1 PATCH: 14 PATCH, EXTENDED RELEASE TRANSDERMAL at 10:11

## 2018-11-11 RX ADMIN — PANTOPRAZOLE SODIUM 40 MG: 40 TABLET, DELAYED RELEASE ORAL at 10:11

## 2018-11-11 RX ADMIN — FUROSEMIDE 20 MG: 20 TABLET ORAL at 10:11

## 2018-11-11 RX ADMIN — POTASSIUM & SODIUM PHOSPHATES POWDER PACK 280-160-250 MG 1 PACKET: 280-160-250 PACK at 11:11

## 2018-11-11 RX ADMIN — AMITRIPTYLINE HYDROCHLORIDE 25 MG: 25 TABLET, FILM COATED ORAL at 09:11

## 2018-11-11 RX ADMIN — OXYCODONE HYDROCHLORIDE AND ACETAMINOPHEN 1 TABLET: 7.5; 325 TABLET ORAL at 05:11

## 2018-11-11 RX ADMIN — IPRATROPIUM BROMIDE AND ALBUTEROL SULFATE 3 ML: .5; 3 SOLUTION RESPIRATORY (INHALATION) at 12:11

## 2018-11-11 NOTE — PROGRESS NOTES
Aerosol tx q6, aerobika and IS q4WA. Patient has orders for NTsx q6 for which she is refusing at this time.       11/11/18 0741   Patient Assessment/Suction   Level of Consciousness (AVPU) alert   Respiratory Effort Unlabored   Expansion/Accessory Muscles/Retractions no use of accessory muscles   All Lung Fields Breath Sounds clear   LLL Breath Sounds diminished   RLL Breath Sounds diminished   Rhythm/Pattern, Respiratory unlabored   PRE-TX-O2-ETCO2   O2 Device (Oxygen Therapy) nasal cannula   $ Is the patient on Low Flow Oxygen? Yes   Flow (L/min) 3   Oxygen Concentration (%) 32   SpO2 97 %   Pulse Oximetry Type Continuous   $ Pulse Oximetry - Multiple Charge Pulse Oximetry - Multiple   Pulse 71   Resp 16   Aerosol Therapy   $ Aerosol Therapy Charges Aerosol Treatment   Respiratory Treatment Status given   SVN/Inhaler Treatment Route mask   Position During Treatment HOB at 45 degrees   Patient Tolerance good   Post-Treatment   Post-treatment Heart Rate (beats/min) 70   Post-treatment Resp Rate (breaths/min) 14   All Fields Breath Sounds unchanged   Vibratory PEP Therapy   $ Vibratory PEP Charges Aerobika Therapy   Type Oscillating PEP Therapy   Administration done with encouragement   Number of Repetitions 15   Comments good   Ready to Wean/Extubation Screen   FIO2<=50 (chart decimal) 0.32

## 2018-11-11 NOTE — PROGRESS NOTES
"   11/11/18 0113   Patient Assessment/Suction   Level of Consciousness (AVPU) alert   Respiratory Effort Normal;Unlabored   Expansion/Accessory Muscles/Retractions no use of accessory muscles   ROCIO Breath Sounds coarse   LLL Breath Sounds diminished   RUL Breath Sounds coarse   RML Breath Sounds diminished   RLL Breath Sounds diminished   Rhythm/Pattern, Respiratory depth regular;pattern regular;unlabored   PRE-TX-O2-ETCO2   O2 Device (Oxygen Therapy) nasal cannula w/ humidification   $ Is the patient on Low Flow Oxygen? Yes   Flow (L/min) 3   SpO2 97 %   Pulse Oximetry Type Intermittent   $ Pulse Oximetry - Multiple Charge Pulse Oximetry - Multiple   Pulse 81   Resp 16   Aerosol Therapy   $ Aerosol Therapy Charges Aerosol Treatment   Respiratory Treatment Status given   SVN/Inhaler Treatment Route mask;with air   Position During Treatment HOB at 45 degrees   Patient Tolerance good   Post-Treatment   Post-treatment Heart Rate (beats/min) 80   Post-treatment Resp Rate (breaths/min) 16   All Fields Breath Sounds unchanged   PT refused NT suction at this time. Pt states, "We will talk about this in the morning."  "

## 2018-11-11 NOTE — PLAN OF CARE
Problem: SLP Goal  Goal: SLP Goal  1. Patient will tolerate regular solids/thin liquids with no overt s/s aspiration  2. Patient will follow compensatory swallow strategies with MIN assist  3. Diet modification as needed  4. MBSS as needed  Outcome: Ongoing (interventions implemented as appropriate)  Dysphagia evaluation completed.

## 2018-11-11 NOTE — PLAN OF CARE
Problem: Patient Care Overview  Goal: Plan of Care Review  Outcome: Ongoing (interventions implemented as appropriate)  Ensured patient safety with frequent checks, assessing pain and chest tube output. Patient remains with a total of 50 ml of drainage from the chest tube. Remains on 3 Liters NC and patient remains in a pleasantly confused. Patient remains free of falls and skin intact. Remains with piv intact and ns infusing. Will continue to monitor.

## 2018-11-11 NOTE — PLAN OF CARE
11/10/18 1924   Patient Assessment/Suction   Level of Consciousness (AVPU) alert   Respiratory Effort Unlabored   Expansion/Accessory Muscles/Retractions no use of accessory muscles   ROCIO Breath Sounds coarse   LLL Breath Sounds diminished   RUL Breath Sounds coarse   RML Breath Sounds diminished   RLL Breath Sounds diminished   Rhythm/Pattern, Respiratory depth regular;pattern regular;unlabored   PRE-TX-O2-ETCO2   O2 Device (Oxygen Therapy) nasal cannula w/ humidification   $ Is the patient on Low Flow Oxygen? Yes   Flow (L/min) 3   SpO2 (!) 93 %   Pulse Oximetry Type Intermittent   $ Pulse Oximetry - Multiple Charge Pulse Oximetry - Multiple   Pulse 91   Resp (!) 22   Aerosol Therapy   $ Aerosol Therapy Charges Aerosol Treatment   Respiratory Treatment Status given   SVN/Inhaler Treatment Route mask;with air   Position During Treatment HOB at 45 degrees   Patient Tolerance good   Post-Treatment   Post-treatment Heart Rate (beats/min) 90   Post-treatment Resp Rate (breaths/min) 22   All Fields Breath Sounds unchanged   Incentive Spirometer   $ Incentive Spirometer Charges refused  (Pt states she will do it later. )   Vibratory PEP Therapy   $ Vibratory PEP Charges Aerobika Therapy   Administration done with encouragement   Number of Repetitions 10   Pt tolerates NC and treatments well. Pt has great effort with Aerobika. Pt refused IS at this time.

## 2018-11-11 NOTE — PLAN OF CARE
Problem: Patient Care Overview  Goal: Plan of Care Review  Outcome: Ongoing (interventions implemented as appropriate)  Pt more willing to work w/ staff today. She got OOB x2 hours today, using IS, and allowed RT to NT suction her. 120cc out of CT his shift, CT dressing changed x3 today d/t large amounts serosanguinous drainage. She's remained free from falls/injuries this shift.

## 2018-11-11 NOTE — PLAN OF CARE
11/09/18 4961   Patient Assessment/Suction   Level of Consciousness (AVPU) responds to voice   Respiratory Effort Unlabored   Expansion/Accessory Muscles/Retractions no use of accessory muscles   ROCIO Breath Sounds coarse   LLL Breath Sounds diminished   RUL Breath Sounds coarse   RML Breath Sounds diminished   RLL Breath Sounds diminished   Rhythm/Pattern, Respiratory depth regular;pattern regular;unlabored   PRE-TX-O2-ETCO2   O2 Device (Oxygen Therapy) nasal cannula w/ humidification   Flow (L/min) 4   SpO2 99 %   Pulse 70   Resp 14   Aerosol Therapy   $ Aerosol Therapy Charges Aerosol Treatment   Respiratory Treatment Status given   SVN/Inhaler Treatment Route mask;with air   Position During Treatment HOB at 45 degrees   Patient Tolerance good   Post-Treatment   Post-treatment Heart Rate (beats/min) 70   Post-treatment Resp Rate (breaths/min) 15   All Fields Breath Sounds unchanged   Incentive Spirometer   $ Incentive Spirometer Charges done with encouragement   Administration (Incentive Spirometer) done with encouragement   Number of Repetitions (Incentive Spirometer) 15   Level (mL) (Incentive Spirometer) 500   Patient Tolerance fair   Vibratory PEP Therapy   $ Vibratory PEP Charges Aerobika Therapy   Administration done with encouragement   Number of Repetitions 15   PT tolerates NC and treatments well. Pt needs encouragement with IS and Aerobika.

## 2018-11-11 NOTE — PLAN OF CARE
Problem: Patient Care Overview  Goal: Plan of Care Review  Outcome: Revised  Bronch done this am. OOB to chair for two hours this afternoon. Tolerating diet, appetite better this evening. Drsg to left CT and incision sites c/d/i.  Safety maintained.

## 2018-11-11 NOTE — PROGRESS NOTES
Progress Note  Hospital Medicine  Patient Name:Bertha Kelly  MRN:  [id number]  Patient Class: IP- Inpatient  Admit Date: 10/26/2018  Length of Stay: 15 days  Expected Discharge Date:   Attending Physician: Vicki Suarez MD  Primary Care Provider:  James Colon MD    SUBJECTIVE:     Principal Problem: Pleural effusion  Initial history of present illness: Bertha Kelly is a 67 y/o female with a PMHx of HTN and thyroid disease who presented to the ED today with c/o SOB which began 3 weeks ago and has progressively gotten worse.  Pt states that she becomes SOB with activity.  She reports that she received a pneumonia vaccine at her doctor's office about a month ago and began having some chest congestion and cough a few days later.  She is a current smoker of about 1/2 pack daily.  Reports increased fatigue and chills, but denies fever, chest pain, N/V, and dysuria.  Reports weight loss of a few pounds in the past 3 weeks due to poor appetite, but states that prior to this illness, weight was stable and she had a very good appetite.  Pt's CXR reveals a LLL pneumonia and was noted to be mildly hypoxic initially requiring supplemental oxygen.  She will be admitted to the service of hospital medicine for further treatment.    PMH/PSH/SH/FH/Meds: reviewed.    Symptoms/Review of Systems: In ICU s/p VATS, left pleural biopsy, lung biopsy and left pleurodesis.  Patient had bronchoscopy repeated yesterday. Continues to have weak cough and is refusing NT suctioning. Discussed with Dr. Abdi. Patient frail and weak and not thriving.  Diet: Cardiac  Activity level: Up with assistance  Pain:  Patient reports no pain.       OBJECTIVE:   Vital Signs (Most Recent):      Temp: 98.6 °F (37 °C) (11/11/18 0800)  Pulse: 67 (11/11/18 0800)  Resp: 14 (11/11/18 0800)  BP: (!) 98/57 (11/11/18 0800)  SpO2: 97 % (11/11/18 0800)       Vital Signs Range (Last 24H):  Temp:  [97.8 °F (36.6 °C)-99.1 °F (37.3 °C)]   Pulse:  []   Resp:   [12-28]   BP: ()/(50-77)   SpO2:  [89 %-100 %]     Weight: 50.4 kg (111 lb 1.8 oz)  Body mass index is 19.68 kg/m².    Intake/Output Summary (Last 24 hours) at 11/11/2018 1004  Last data filed at 11/11/2018 0500  Gross per 24 hour   Intake 1988.33 ml   Output 2535 ml   Net -546.67 ml     Physical Examination:  Constitutional: She is oriented to person, place, and time. She appears well-developed and well-nourished. No distress.   HENT:   Head: Normocephalic and atraumatic.   Eyes: Pupils are equal, round, and reactive to light.   Neck: Neck supple. No thyromegaly present.   Cardiovascular: Normal rate and regular rhythm. Exam reveals no gallop and no friction rub.   No murmur heard.  Pulmonary/Chest: No respiratory distress. She has wheezes. Left chest tube is in place  Abdominal: Soft. Bowel sounds are normal. She exhibits no distension. There is no tenderness. There is no guarding.   Musculoskeletal: Normal range of motion. She exhibits no edema.   Neurological: She is alert and oriented to person, place, and time.   Skin: Skin is warm and dry. No erythema.   Psychiatric: She has a normal mood and affect.     CBC:  Recent Labs   Lab 11/09/18  0335 11/10/18  0423 11/11/18  0319   WBC 10.60 11.30 12.30   RBC 3.67* 3.89* 3.46*   HGB 11.0* 11.5* 10.3*   HCT 33.3* 35.1* 31.3*   * 451* 479*   MCV 91 90 91   MCH 30.0 29.5 29.7   MCHC 33.0 32.7 32.8   BMP  Recent Labs   Lab 11/09/18  0335 11/10/18  0423 11/11/18  0319   GLU 88 100 146*   * 137 139   K 4.0 3.8 4.0   CL 95 94* 97   CO2 31* 34* 34*   BUN 6* 6* 6*   CREATININE 0.6 0.6 0.6   CALCIUM 9.0 8.8 8.4*   MG 1.9 1.6 1.9      Diagnostic Results:  Microbiology Results (last 7 days)     Procedure Component Value Units Date/Time    Culture, Respiratory with Gram Stain [[id number]] Collected:  11/10/18 0815    Order Status:  Completed Specimen:  Respiratory from Bronchial Wash Updated:  11/11/18 0610     Respiratory Culture Normal respiratory christianne      Gram Stain (Respiratory) >10 epithelial cells per low power field     Gram Stain (Respiratory) Many WBC's     Gram Stain (Respiratory) Few Gram positive cocci    Culture, Respiratory with Gram Stain [[id number]] Collected:  11/08/18 1258    Order Status:  Completed Specimen:  Respiratory from Bronchial Wash Updated:  11/10/18 0748     Respiratory Culture Normal respiratory christianne     Gram Stain (Respiratory) Few WBC's     Gram Stain (Respiratory) Rare Gram positive cocci         MRI Brain  Impression:       1. There is no intracranial hemorrhage or mass.  There is no pathologic enhancement in the brain.  2. Brain volume is normal.  There is no hydrocephalus or midline shift.  3. There is mild nonspecific and age indeterminate periventricular and subcortical white matter disease.  These findings likely reflect sequelae of chronic small vessel ischemic change.  Additionally, there are few small regions of high signal intensity on the diffusion sequence in the left frontal and parietal white matter.  These regions are also FLAIR and T2 hyperintense which is somewhat nonspecific but may reflect some degree of T2 shine through and/or subacute ischemic change.  There is no large vascular territory infarction.         CXR: The cardiomediastinal silhouette is normal in appearance.  No pulmonary vascular congestion appreciated. There is dense airspace consolidative change present at the left lung base and there is patchy segmental airspace opacity in the left mid and upper lung zones.  Differential considerations include pneumonia and aspiration.  Underlying pleural fluid cannot be excluded.  An underlying mass would be difficult to exclude.  Serial radiography will be necessary to ensure resolution and exclude an underlying lesion.  There is an age-indeterminate, possibly remote, right posterior 10th rib fracture.    CXR:  Unchanged small left apical pneumothorax. Unchanged moderate left and small right pleural effusions and  multifocal airspace disease.    CT chest:  Small left pneumothorax status post preceding left thoracentesis.  Less than 10%.  Moderate left and small right pleural effusions and moderate left lung and mild right lower lobe consolidation/atelectasis.  Enlarged prevascular mediastinal lymph node.  Additional nodular opacities at the cardiac apex which may represent additional prominent lymph nodes, or possibly pleural based nodules.  Moderate emphysema.  A distinct lung mass is not visible however follow-up to resolution of the pulmonary consolidation is recommended to evaluate for on the underlying lung mass, considering the enlarged mediastinal lymph node.  Further evaluation with CT chest with contrast may also be useful if the patient can receive IV contrast. 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    CXR: Unchanged small left apical pneumothorax. Unchanged moderate left and small right pleural effusions and multifocal airspace disease.    CXR: Continued dense infiltrate in the left mid and lower lung field with atelectasis and moderate to large left pleural effusion which obscures the heart size.  Small infiltrate at the right lung base and small right pleural effusion as well.    ECHO:  · Left ventricle ejection fraction is at 60%  · No wall motion abnormalities.  · Grade I (mild) left ventricular diastolic dysfunction consistent with impaired relaxation.  · LA pressure is normal.  · Mitral valve is mildly sclerotic  · There is a moderate left pleural effusion.  · Technically very difficult study.    CXR: Increased opacification left hemithorax suggesting atelectasis.    CT chest abdomen and pelvis with and without contrast:  1. Left hilar masslike opacity concerning for primary neoplasm.  Concern for pleural and mediastinal lymph node metastases.  Local  anatomic considerations as described.  2. Small complex left pleural effusion with chest tube in place.  Moderate volume right pleural effusion.  3. Emphysema.  4. Porcelain gallbladder.  Associated increased risk of gallbladder malignancy.    RUQ abdominal US:  Calcified gallbladder wall (porcelain gallbladder), which reportedly has a small increased risk of development of gallbladder cancer. Moderately dilated common bile duct, which is nonspecific.  Correlation with obstructive symptomatology and laboratory values recommended.    CXR: Significant improvement in aeration of the left lung.  Left chest tube in place and small bilateral pleural effusions.    Bronchoscopy: After informed consent and time out and sedation by anesthesia- pt had bronchoscopy.  ebl 0 and no complications.  Copious thick secretions noted in left airways.  Washes done to clear completely.  cutlure submitted.    Assessment/Plan:     * Acute respiratory failure with hypoxia  Large left para-pneumonic effusion s/p thoracentesis  Lung Adenocarcinoma s/p VATS, left pleural biopsy, lung biopsy and left pleurodesis     Close monitoring in ICU. Follow pulmonary and CTS recommendations.   Continue supplemental oxygen as needed.  Nebulizer treatments   Monitor and treat as clinically indicated.  Dr. Knox is following.       Tobacco abuse     Smoking cessation counseling performed. Dangers of cigarette smoking were reviewed with patient in detail and patient was encouraged to quit.      Generalized anxiety disorder     Chronic; currently controlled.  Continue home regimen.      Essential hypertension     Chronic; stable.  Continue chronic meds.      Hypothyroidism due to Hashimoto's thyroiditis     Chronic; pt states controlled.  Continue levothyroxine.      Pneumonia of left lower lobe due to infectious organism     Presents with SOB; LLL pneumonia identified on CXR.    Initiate treatment regimen for CAP, which includes a beta lactam and macrolide.    IV Rocephin and IV azithromycin.  Nebulizer treatments  Agree with ST eval       Worsening Liver enzymes - improving  DC IV Rocephin. Trend LFTs. Hepatitis panel is negative and RUQ abdominal US results reviewed.    Time spent in care of the patient, counseling and coordination of care (Greater than 50% spent in direct face to face contact): 20 min.    DVT prophylaxis: Lovenox 40 mg Sq q day.    Elvin Babcock MD  Department of Hospital Medicine   [facility]

## 2018-11-11 NOTE — PT/OT/SLP EVAL
Speech Language Pathology Evaluation  Bedside Swallow    Patient Name:  Bertha Kelly   MRN:  [id number]  Admitting Diagnosis: Pleural effusion    Recommendations:                 General Recommendations:  Dysphagia therapy  Diet recommendations:  Regular, Thin, Nectar Thick   Aspiration Precautions: throat clear after the swallow   General Precautions: Standard, aspiration  Communication strategies:  none    History:     Past Medical History:   Diagnosis Date    Hypertension     Thyroid disease        Past Surgical History:   Procedure Laterality Date    BIOPSY, LUNG, THORACOSCOPIC Left 11/5/2018    Performed by Mp Jones MD at [facility] OR    BRONCHOSCOPY N/A 11/8/2018    Procedure: BRONCHOSCOPY;  Surgeon: Liu Abdi MD;  Location: [facility] ENDO;  Service: Endoscopy;  Laterality: N/A;    BRONCHOSCOPY N/A 11/8/2018    Performed by Liu Abdi MD at [facility] ENDO    EYE SURGERY      childhood    PLEURODESIS WITH VIDEO-ASSISTED THORACOSCOPIC SURGERY (VATS) Left 11/5/2018    Procedure: VATS, WITH PLEURODESIS;  Surgeon: Mp Jones MD;  Location: [facility] OR;  Service: Cardiothoracic;  Laterality: Left;    THORACOSCOPIC BIOPSY OF LUNG Left 11/5/2018    Procedure: BIOPSY, LUNG, THORACOSCOPIC;  Surgeon: Mp Jones MD;  Location: [facility] OR;  Service: Cardiothoracic;  Laterality: Left;    THORACOSCOPIC BIOPSY OF PLEURA Left 11/5/2018    Procedure: VATS, WITH PLEURA BIOPSY;  Surgeon: Mp Jones MD;  Location: [facility] OR;  Service: Cardiothoracic;  Laterality: Left;    VATS, WITH PLEURA BIOPSY Left 11/5/2018    Performed by Mp Jones MD at [facility] OR    VATS, WITH PLEURODESIS Left 11/5/2018    Performed by Mp Jones MD at [facility] OR       Social History: Patient lives with .    Prior Intubation HX:  Recent thoracentesis and bronchoscopy.  Recent cystology reveals lung adenocarcinoma    Modified Barium Swallow: n/a    Chest X-Rays: Today:  Atelectasis in LLL field.  Infiltrate and  atelectasis at R lung base with a R pleural effusion.  L chest tube in place.    Prior diet: Regular cardiac.      Subjective     Patient in bed at contact for tx.  No complaints.    Pain/Comfort:  · Patient denies pain at this time.    Objective:     Oral Musculature Evaluation  · Oral Musculature: WFL  · Dentition: present and adequate  · Secretion Management: (no problems noted)  · Mucosal Quality: adequate  · Mandibular Strength and Mobility: WFL  · Oral Labial Strength and Mobility: WFL  · Lingual Strength and Mobility: WFL  · Velar Elevation: WFL  · Buccal Strength and Mobility: WFL  · Volitional Cough: weak  · Volitional Swallow: laryngeal elevation noted  · Voice Prior to PO Intake: decreased intensity    Bedside Swallow Eval:   Consistencies Assessed:  · Thin liquids via straw Slight throat clearing 1 of 2 trials  · Puree WFL  · Mixed consistencies Mild throat clear  · Solids WFL     Oral Phase:   · WFL    Pharyngeal Phase:   · throat clearing    Compensatory Strategies  · Volitional cough/throat clear    Assessment:     Bertha Kelly is a 68 y.o. female with an SLP diagnosis of Mild Pharyngeal Dysphagia.  She presents with inconsistent throat clearing after thin liquids and mixed consistencies.  Recommend f/u to assess consistency of pharyngeal s/s with tx to be provided accordingly.    Goals:   Multidisciplinary Problems     SLP Goals        Problem: SLP Goal    Goal Priority Disciplines Outcome   SLP Goal     SLP Ongoing (interventions implemented as appropriate)   Description:  1. Patient will tolerate regular solids/thin liquids with no overt s/s aspiration  2. Patient will follow compensatory swallow strategies with MIN assist  3. Diet modification as needed  4. MBSS as needed                    Plan:     · Patient to be seen:  5 x/week   · Plan of Care expires:  11/23/18  · Plan of Care reviewed with:  patient   · SLP Follow-Up:  Yes       Discharge recommendations:  home with home health    Barriers to Discharge:  None    Time Tracking:     SLP Treatment Date:   11/11/18  Speech Start Time:  1543  Speech Stop Time:  1554     Speech Total Time (min):  11 min    Billable Minutes: Eval Swallow and Oral Function 11    Tyra August CCC-SLP  11/11/2018

## 2018-11-11 NOTE — PROGRESS NOTES
After informed consent, time out, sedation by anesthesia- pt had bronchsocpy to clear retained secretions.  Larynx and tracheobronchial tree were remarkable for excess thick sl pale secretions.  No blood loss nor complications.  Culture from wash submitted.  Progress Note  PULMONARY    Admit Date: 10/26/2018   11/11/2018      SUBJECTIVE:     Oct 30, breathing better, some appetite.  Oct 31, no c/o, tried pull out lines last pm, denies sob.  Nov 1, no c/o, says breathing and eating ok?  Nov 5, post pleurodesis. Confused.  No c/o  Nov 6, less confused.  No c/o  Nov 7, pleasant. No c/o.  Nov 8 , no c/0  Nov 9, same, no c/o  Nov 10, no c/o- needed 100% 02 again with cxr vol loss  Nov 11, no c/o - nc, not sob,       PFSH and Allergies reviewed.    OBJECTIVE:     Vitals (Most recent):  Vitals:    11/11/18 0800   BP: (!) 98/57   Pulse: 67   Resp: 14   Temp: 98.6 °F (37 °C)       Vitals (24 hour range):  Temp:  [97.8 °F (36.6 °C)-99.1 °F (37.3 °C)]   Pulse:  []   Resp:  [12-31]   BP: ()/(50-77)   SpO2:  [89 %-100 %]       Intake/Output Summary (Last 24 hours) at 11/11/2018 0925  Last data filed at 11/11/2018 0500  Gross per 24 hour   Intake 2583.33 ml   Output 2700 ml   Net -116.67 ml          Physical Exam:  The patient's neuro status (alertness,orientation,cognitive function,motor skills,), pharyngeal exam (oral lesions, hygiene, abn dentition,), Neck (jvd,mass,thyroid,nodes in neck and above/below clavicle),RESPIRATORY(symmetry,effort,fremitus,percussion,auscultation),  Cor(rhythm,heart tones including gallops,perfusion,edema)ABD(distention,hepatic&splenomegaly,tenderness,masses), Skin(rash,cyanosis),Psyc(affect,judgement,).  Exam negative except for these pertinent findings:    Alert, sl confused, left chest tube, decreased bs  And dull left ant chest, no distress.    Radiographs reviewed: view by direct vision  cxr with reasonable aeration left lung      Results for orders placed during the hospital  encounter of 10/26/18   X-Ray Chest 1 View    Narrative EXAMINATION:  XR CHEST 1 VIEW    CLINICAL HISTORY:  post thoracentesis;    TECHNIQUE:  Single frontal view of the chest was performed.    COMPARISON:  Chest radiograph 10/29/2018; CT dated 10/30/2018    FINDINGS:  Bibasilar airspace disease.  Normal size heart. Indistinct pulmonary vasculature. Blunted costophrenic angles.  Left apical pneumothorax.      Impression 1. Post thoracentesis with reduction in size of left pleural effusion and development of apical pneumothorax.  This is more conspicuous on subsequent CT.  2. Small right pleural effusion.  3. Bibasilar airspace disease which could reflect atelectasis, pneumonia, edema.      Electronically signed by: Demar Ann  Date:    10/30/2018  Time:    10:33   ]    Labs     Recent Labs   Lab 11/11/18 0319   WBC 12.30   HGB 10.3*   HCT 31.3*   *   Electronically reviewed and signed by:   Ursula Chow MD   Signed on 10/30/18 at 13:20   Pathologist review of pleural fluid cell count:   The cytospin shows a predominance of mature appearing lymphocytes   with rare neutrophils and eosinophils seen in the background as well   as an occasional atypical mesothelial cell, favor reactive.     Correlate clinically.  Recent Labs   Lab 11/11/18 0319      K 4.0   CL 97   CO2 34*   BUN 6*   CREATININE 0.6   *   CALCIUM 8.4*   MG 1.9   PHOS 2.6*   AST 38   *   ALKPHOS 297*   BILITOT 0.2   PROT 4.9*   ALBUMIN 1.6*   No results for input(s): PH, PCO2, PO2, HCO3 in the last 24 hours.  Microbiology Results (last 7 days)     Procedure Component Value Units Date/Time    Culture, Respiratory with Gram Stain [[id number]] Collected:  11/10/18 0815    Order Status:  Completed Specimen:  Respiratory from Bronchial Wash Updated:  11/11/18 0610     Respiratory Culture Normal respiratory christianne     Gram Stain (Respiratory) >10 epithelial cells per low power field     Gram Stain (Respiratory) Many WBC's      Gram Stain (Respiratory) Few Gram positive cocci    Culture, Respiratory with Gram Stain [[id number]] Collected:  11/08/18 1258    Order Status:  Completed Specimen:  Respiratory from Bronchial Wash Updated:  11/10/18 0748     Respiratory Culture Normal respiratory christianne     Gram Stain (Respiratory) Few WBC's     Gram Stain (Respiratory) Rare Gram positive cocci        Impression       1. There is no intracranial hemorrhage or mass.  There is no pathologic enhancement in the brain.  2. Brain volume is normal.  There is no hydrocephalus or midline shift.  3. There is mild nonspecific and age indeterminate periventricular and subcortical white matter disease.  These findings likely reflect sequelae of chronic small vessel ischemic change.  Additionally, there are few small regions of high signal intensity on the diffusion sequence in the left frontal and parietal white matter.  These regions are also FLAIR and T2 hyperintense which is somewhat nonspecific but may reflect some degree of T2 shine through and/or subacute ischemic change.  There is no large vascular territory infarction.  This report was flagged in Epic as abnormal.      Electronically signed by: Tuan Finn MD  Date: 11/09/2018  Time: 11:13       Impression:  Active Hospital Problems    Diagnosis  POA    *Pleural effusion [J90]  Yes    Atelectasis [J98.11]  Yes    Abnormal CXR [R93.89]  Yes    Moderate malnutrition [E44.0]  Yes    Wheeze [R06.2]  Yes    Anorexia [R63.0]  Yes    SOB (shortness of breath) [R06.02]  Yes    Pneumonia of left lower lobe due to infectious organism [J18.1]  Yes    Hypothyroidism due to Hashimoto's thyroiditis [E03.8, E06.3]  Yes    Essential hypertension [I10]  Yes    Generalized anxiety disorder [F41.1]  Yes    Acute respiratory failure with hypoxia [J96.01]  Yes    Tobacco abuse [Z72.0]  Yes      Resolved Hospital Problems   No resolved problems to display.               Plan:     Oct 30, fluid was lymph  predominate exudate.  Cancer concerning.  F/u cxr am - if fluid building up would recommend thoracoscopic procedure with Dr Jones.  Cytology pending.  procalcitonin was very low.  Check d dimer and bnp am.  Discussed with pt and family.  Oct 31, bnp up, cytospin cells not felt malignant - cytology pending.  D dimer good.  cxr left effusion increasing. Dose lasix, check echo.  May need Dr Jones for procedure?  F/u cxr am to decide.    Nov 1, echo na, cxr worsening - fluid rapidly returning.  S. Maltophilia in sputum but procalcitonin was low.  Cytology na.  Discussed with Dr Suarez.      Pt maybe a little better but is non ambulatory and frail ( too frail to do therapy).  Will dose lasix 20, ask Dr Jones to see.  Pt wanting to go home????    lft sl up and co2 rising.    Nov 5, cytology with adenoca c/w lung. Post pleurodysis now.     Consider oncology consult to facilitate disposition.  Pt not thriving.    Nov 6, min confused, looks to be progressing, needs mobilize.      Nov 7, lft up post op with ast 449, otherwise same.  Looks frail.  Nov 8 left chest opacified with vol loss, bronch asap to clear out.  Pt not thriving.  Nov 9,   cxr with left lung aerated, pt not thriving. Discussed with .  [place] not good.     Nov 10, needed another bronch , cultures negative from last, will do nt suction, dose prednisone x1 (decreases pleurodesis success), nt suction and f/u cxr.  If pt cannot clear secretions , she will not survive into future.    Mri neg for mets but has ischemic dz.    Nov 11, cough ineffective, pt refusing nt suction, and non ambulatory.  Discussed situation looks acutely terminal - just matter of time if this is best she can do.  Expect will get pneumonia soon or resp failure or just failure to thrive.   in room.     Will repeat steroid low dose.  Encouraged pt/ to consider code status.  Will ask speech to see - she cannot cough?    .

## 2018-11-11 NOTE — PROGRESS NOTES
Subjective:  Patient is making her best effort at incentive spirometry, coughing, and expectoration secretions.  She is frustrated by continued presence of chest tube.  No other new complaints for pertinent findings on a 14 point review of systems.    Objective:  T-max 99.1°  Blood pressure 139/60  Pulse 84  Respirations 26  Intake/output 2583/2915    Laboratory:  White count 12.3, hemoglobin 10.3, hematocrit 31.3, platelets 479.  Sodium 139, potassium 4, chloride 97, CO2 34, BUN 6, creatinine 0.6, glucose 146, calcium 8.4, phosphorus 2.6, magnesium 1.9, alkaline phosphatase 297, total protein 4.9, albumin 1.6, total bilirubin 0.2, AST 38, , GFR greater than 60.    Chest x-ray:  Chest tube remains intact. There is continued left lower lobe atelectasis.  Right lower lobe/pleural effusion is unchanged.    Physical examination:  Well-developed, elderly, frail appearing, white female, in no acute distress, who is alert and oriented x4.  HEENT: Normocephalic, atraumatic. Oral mucosa pink and moist. Lips without   lesions. Tongue midline. Oropharynx clear. Nonicteric sclerae.   NECK: Supple, no adenopathy. No carotid bruits, thyromegaly or thyroid nodule.   HEART: Regular rate and rhythm without murmur, gallop or rub.   LUNGS:  Decreased breath sounds in lower lung fields bilaterally. Normal respiratory effort.   ABDOMEN: Soft, nontender, nondistended with positive normoactive bowel sounds,   no hepatosplenomegaly.   EXTREMITIES: No cyanosis, clubbing or edema. Distal pulses are intact.   AXILLAE AND GROIN: No palpable pathologic lymphadenopathy is appreciated.   SKIN: Intact/turgor normal. Left chest tube remains intact and free from signs of infection or hemorrhage.    NEUROLOGIC:  No focal deficits are appreciated.  :  [place] catheter intact/functioning.       Impression:  1.  Unresectable non-small cell carcinoma lung.  2.  Mild anemia.  3.  Hypophosphatemia.    Plan:  1.  Continue ongoing postoperative  care.  2.  Replace phosphorus.

## 2018-11-12 LAB
ALBUMIN SERPL BCP-MCNC: 1.8 G/DL
ALP SERPL-CCNC: 285 U/L
ALT SERPL W/O P-5'-P-CCNC: 103 U/L
ANION GAP SERPL CALC-SCNC: 10 MMOL/L
AST SERPL-CCNC: 81 U/L
BACTERIA SPEC AEROBE CULT: NORMAL
BACTERIA SPEC AEROBE CULT: NORMAL
BASOPHILS # BLD AUTO: 0 K/UL
BASOPHILS NFR BLD: 0.2 %
BILIRUB SERPL-MCNC: 0.2 MG/DL
BUN SERPL-MCNC: 8 MG/DL
CALCIUM SERPL-MCNC: 8.1 MG/DL
CHLORIDE SERPL-SCNC: 98 MMOL/L
CO2 SERPL-SCNC: 28 MMOL/L
CREAT SERPL-MCNC: 0.6 MG/DL
DIFFERENTIAL METHOD: ABNORMAL
EOSINOPHIL # BLD AUTO: 0 K/UL
EOSINOPHIL NFR BLD: 0.3 %
ERYTHROCYTE [DISTWIDTH] IN BLOOD BY AUTOMATED COUNT: 14 %
EST. GFR  (AFRICAN AMERICAN): >60 ML/MIN/1.73 M^2
EST. GFR  (NON AFRICAN AMERICAN): >60 ML/MIN/1.73 M^2
GLUCOSE SERPL-MCNC: 95 MG/DL
GRAM STN SPEC: NORMAL
HCT VFR BLD AUTO: 29.9 %
HGB BLD-MCNC: 9.7 G/DL
LYMPHOCYTES # BLD AUTO: 1.5 K/UL
LYMPHOCYTES NFR BLD: 13.1 %
MAGNESIUM SERPL-MCNC: 2 MG/DL
MCH RBC QN AUTO: 29.8 PG
MCHC RBC AUTO-ENTMCNC: 32.6 G/DL
MCV RBC AUTO: 91 FL
MONOCYTES # BLD AUTO: 1 K/UL
MONOCYTES NFR BLD: 8.9 %
NEUTROPHILS # BLD AUTO: 8.9 K/UL
NEUTROPHILS NFR BLD: 77.5 %
PHOSPHATE SERPL-MCNC: 3 MG/DL
PLATELET # BLD AUTO: 480 K/UL
PMV BLD AUTO: 7.7 FL
POTASSIUM SERPL-SCNC: 4.9 MMOL/L
PROT SERPL-MCNC: 4.6 G/DL
RBC # BLD AUTO: 3.27 M/UL
SODIUM SERPL-SCNC: 136 MMOL/L
WBC # BLD AUTO: 11.5 K/UL

## 2018-11-12 PROCEDURE — S4991 NICOTINE PATCH NONLEGEND: HCPCS | Performed by: NURSE PRACTITIONER

## 2018-11-12 PROCEDURE — 36415 COLL VENOUS BLD VENIPUNCTURE: CPT

## 2018-11-12 PROCEDURE — 25000003 PHARM REV CODE 250: Performed by: NURSE PRACTITIONER

## 2018-11-12 PROCEDURE — 99233 SBSQ HOSP IP/OBS HIGH 50: CPT | Mod: ,,, | Performed by: INTERNAL MEDICINE

## 2018-11-12 PROCEDURE — 94664 DEMO&/EVAL PT USE INHALER: CPT

## 2018-11-12 PROCEDURE — 20000000 HC ICU ROOM

## 2018-11-12 PROCEDURE — 99900035 HC TECH TIME PER 15 MIN (STAT)

## 2018-11-12 PROCEDURE — 83735 ASSAY OF MAGNESIUM: CPT

## 2018-11-12 PROCEDURE — 92526 ORAL FUNCTION THERAPY: CPT

## 2018-11-12 PROCEDURE — 25000003 PHARM REV CODE 250: Performed by: INTERNAL MEDICINE

## 2018-11-12 PROCEDURE — 27000221 HC OXYGEN, UP TO 24 HOURS

## 2018-11-12 PROCEDURE — 99232 SBSQ HOSP IP/OBS MODERATE 35: CPT | Mod: ,,, | Performed by: INTERNAL MEDICINE

## 2018-11-12 PROCEDURE — 94761 N-INVAS EAR/PLS OXIMETRY MLT: CPT

## 2018-11-12 PROCEDURE — 84100 ASSAY OF PHOSPHORUS: CPT

## 2018-11-12 PROCEDURE — 80053 COMPREHEN METABOLIC PANEL: CPT

## 2018-11-12 PROCEDURE — 63600175 PHARM REV CODE 636 W HCPCS: Performed by: INTERNAL MEDICINE

## 2018-11-12 PROCEDURE — G8998 SWALLOW D/C STATUS: HCPCS | Mod: CH

## 2018-11-12 PROCEDURE — 85025 COMPLETE CBC W/AUTO DIFF WBC: CPT

## 2018-11-12 PROCEDURE — 94640 AIRWAY INHALATION TREATMENT: CPT

## 2018-11-12 PROCEDURE — 94799 UNLISTED PULMONARY SVC/PX: CPT

## 2018-11-12 PROCEDURE — G8996 SWALLOW CURRENT STATUS: HCPCS | Mod: CH

## 2018-11-12 PROCEDURE — 25000242 PHARM REV CODE 250 ALT 637 W/ HCPCS: Performed by: INTERNAL MEDICINE

## 2018-11-12 RX ORDER — PREDNISONE 5 MG/1
10 TABLET ORAL 2 TIMES DAILY
Status: DISCONTINUED | OUTPATIENT
Start: 2018-11-12 | End: 2018-11-14 | Stop reason: HOSPADM

## 2018-11-12 RX ORDER — LORAZEPAM 0.5 MG/1
0.5 TABLET ORAL EVERY 6 HOURS PRN
Status: DISCONTINUED | OUTPATIENT
Start: 2018-11-12 | End: 2018-11-14 | Stop reason: HOSPADM

## 2018-11-12 RX ADMIN — IPRATROPIUM BROMIDE AND ALBUTEROL SULFATE 3 ML: .5; 3 SOLUTION RESPIRATORY (INHALATION) at 07:11

## 2018-11-12 RX ADMIN — OXYCODONE HYDROCHLORIDE AND ACETAMINOPHEN 1 TABLET: 7.5; 325 TABLET ORAL at 04:11

## 2018-11-12 RX ADMIN — LORAZEPAM 1.5 MG: 1 TABLET ORAL at 11:11

## 2018-11-12 RX ADMIN — LEVOTHYROXINE SODIUM 50 MCG: 50 TABLET ORAL at 06:11

## 2018-11-12 RX ADMIN — LORAZEPAM 0.5 MG: 0.5 TABLET ORAL at 05:11

## 2018-11-12 RX ADMIN — PREDNISONE 10 MG: 5 TABLET ORAL at 09:11

## 2018-11-12 RX ADMIN — NICOTINE 1 PATCH: 14 PATCH, EXTENDED RELEASE TRANSDERMAL at 09:11

## 2018-11-12 RX ADMIN — AMITRIPTYLINE HYDROCHLORIDE 25 MG: 25 TABLET, FILM COATED ORAL at 09:11

## 2018-11-12 RX ADMIN — ESCITALOPRAM 20 MG: 10 TABLET, FILM COATED ORAL at 09:11

## 2018-11-12 RX ADMIN — POTASSIUM & SODIUM PHOSPHATES POWDER PACK 280-160-250 MG 1 PACKET: 280-160-250 PACK at 05:11

## 2018-11-12 RX ADMIN — IPRATROPIUM BROMIDE AND ALBUTEROL SULFATE 3 ML: .5; 3 SOLUTION RESPIRATORY (INHALATION) at 01:11

## 2018-11-12 RX ADMIN — ENOXAPARIN SODIUM 40 MG: 100 INJECTION SUBCUTANEOUS at 05:11

## 2018-11-12 RX ADMIN — POTASSIUM & SODIUM PHOSPHATES POWDER PACK 280-160-250 MG 1 PACKET: 280-160-250 PACK at 09:11

## 2018-11-12 RX ADMIN — OXYCODONE HYDROCHLORIDE AND ACETAMINOPHEN 1 TABLET: 7.5; 325 TABLET ORAL at 09:11

## 2018-11-12 RX ADMIN — OXYCODONE HYDROCHLORIDE AND ACETAMINOPHEN 1 TABLET: 7.5; 325 TABLET ORAL at 11:11

## 2018-11-12 RX ADMIN — PANTOPRAZOLE SODIUM 40 MG: 40 TABLET, DELAYED RELEASE ORAL at 09:11

## 2018-11-12 RX ADMIN — IPRATROPIUM BROMIDE AND ALBUTEROL SULFATE 3 ML: .5; 3 SOLUTION RESPIRATORY (INHALATION) at 10:11

## 2018-11-12 RX ADMIN — METOPROLOL TARTRATE 50 MG: 50 TABLET ORAL at 09:11

## 2018-11-12 RX ADMIN — FUROSEMIDE 20 MG: 20 TABLET ORAL at 09:11

## 2018-11-12 RX ADMIN — POTASSIUM & SODIUM PHOSPHATES POWDER PACK 280-160-250 MG 1 PACKET: 280-160-250 PACK at 06:11

## 2018-11-12 RX ADMIN — ACETAMINOPHEN 650 MG: 325 TABLET, FILM COATED ORAL at 08:11

## 2018-11-12 NOTE — PROGRESS NOTES
Patient comfortable and without specific complaint  Chest tube output scant  Drain removed  Follow up CXR OK

## 2018-11-12 NOTE — PT/OT/SLP PROGRESS
"Speech Language Pathology Treatment    Patient Name:  Bertha Kelly   MRN:  [id number]  Admitting Diagnosis: Pleural effusion    Recommendations:                 General Recommendations:  Follow-up not indicated; Pt d/c to hospice  Diet recommendations:  Regular, Liquid Diet Level: Thin   Aspiration Precautions: Frequent oral care, Monitor for s/s of aspiration, Strict aspiration precautions and Wear oxygen during intake   General Precautions: Standard, aspiration    Subjective     "When my lungs get bad so does my voice"   present at bedside   Patient goals: none stated     Pain/Comfort:  ·  None indicated     Objective:      Pt seen in ICU for ongoing assessment of swallow function. Pt noted with decreased vocal intensity suggesting poor breath support. Upon formal evaluation, Pt was able to count from 1-8 on expiratory breath, maintain a sustained "ah" for 3-5 seconds and produce 3 words before losing support. Instructed Pt on diaphragmatic breathing however, understanding was not achieved.    She denies any difficulty with swallowing at this time. Education provided re: role of SOB and respiratory compromise and increased risk of aspiration. She tolerated 4 single straw sips of thin liquid w/ out overt s/s of aspiration or changes in vocal quality. Session terminated as RN present to remove chest tube. Further education re: upper respiratory system relation to swallowing function, s/s of aspiration and risks associated with medically compromised individuals provided to  outside of room.     Has the patient been evaluated by SLP for swallowing?   Yes  Keep patient NPO? No   Current Respiratory Status: nasal cannula        Assessment:     Bertha Kelly is a 68 y.o. female with an SLP diagnosis of Dysphagia and Aphonia. Per Pt chart review, Pt to d/c to hospice. Skilled SLP services not warranted at this time. Please reconsult PRN.     Goals:   Multidisciplinary Problems     SLP Goals        " Problem: SLP Goal    Goal Priority Disciplines Outcome   SLP Goal     SLP Ongoing (interventions implemented as appropriate)   Description:  1. Patient will tolerate regular solids/thin liquids with no overt s/s aspiration  2. Patient will follow compensatory swallow strategies with MIN assist  3. Diet modification as needed  4. MBSS as needed                    Plan:     · Patient to be seen:  5 x/week   · Plan of Care expires:  11/23/18  · Plan of Care reviewed with:  patient   · SLP Follow-Up:  No       Discharge recommendations:  home with hospice     Time Tracking:     SLP Treatment Date:   11/12/18  Speech Start Time:  1118  Speech Stop Time:  1128     Speech Total Time (min):  10 min    Billable Minutes: Treatment Swallowing Dysfunction 10 and Total Time 10    Álvaro Marcus CCC-SLP  11/12/2018

## 2018-11-12 NOTE — PLAN OF CARE
Per Georgette ALMODOVAR plan is for pt to discharge home with hospice.  Family is requesting to speak to [facility] tomorrow at 9 a.m.  Spoke to Rolanda with [facility], [phone] to update her and faxed her pt's hospice consult, face sheet, H & P and MAR through [facility].      11/12/18 1210   Discharge Reassessment   Assessment Type Discharge Planning Reassessment   Discharge Plan A Hospice/home

## 2018-11-12 NOTE — PLAN OF CARE
Problem: SLP Goal  Goal: SLP Goal  1. Patient will tolerate regular solids/thin liquids with no overt s/s aspiration  2. Patient will follow compensatory swallow strategies with MIN assist  3. Diet modification as needed  4. MBSS as needed   Outcome: Ongoing (interventions implemented as appropriate)  Education provided to family and Pt. Skilled SLP services not warranted given Pt's current status w/ d/c to hospice. However, if medical team wishes to make appropriate diet recommendations secondary to concerns for aspiration into airway, please order MBS.

## 2018-11-12 NOTE — PLAN OF CARE
Problem: Patient Care Overview  Goal: Plan of Care Review  Outcome: Ongoing (interventions implemented as appropriate)  Ensured patient safety with frequent checks, assessing pain and chest tube output. Patient remains with a total of 40 ml of drainage from the chest tube. Remains on 3 Liters NC and patient remains in a pleasantly confused. Patient remains free of falls and skin intact. Remains with piv intact and ns infusing. A lot of education done on completing IS and NT suctioning. Will continue to monitor.

## 2018-11-12 NOTE — PROGRESS NOTES
[facility]  Hematology/Oncology  Progress Note    Patient Name: Bertha Kelly  Admission Date: 10/26/2018  Hospital Length of Stay: 16 days  Code Status: Full Code   November 12  Subjective:     Interval History:   Patient with lung cancer adenocarcinoma as well as pneumonia tolerating antimicrobial therapy for such.  She does have locally advanced disease with encasement of arteries on imaging studies.  This morning she is comfortable in no distress. She remains on oxygen.  She is able to report that she is not having pain after Left lung  VATS performed and her breathing is much easier than at admit  No acute events ON      Medications:  Continuous Infusions:   lactated ringers 100 mL/hr at 11/11/18 0508     Scheduled Meds:   albuterol-ipratropium  3 mL Nebulization Q6H    amitriptyline  25 mg Oral QHS    enoxaparin  40 mg Subcutaneous Daily    escitalopram oxalate  20 mg Oral Daily    furosemide  20 mg Oral Daily    levothyroxine  50 mcg Oral Before breakfast    lidocaine (PF) 10 mg/ml (1%)        lidocaine (PF) 10 mg/ml (1%)        lidocaine (PF)        lidocaine-EPINEPHrine (PF) 1%-1:200,000        metoprolol tartrate  50 mg Oral BID    nicotine  1 patch Transdermal Daily    oxymetazoline        oxymetazoline        pantoprazole  40 mg Oral Daily    polyethylene glycol  17 g Oral BID    potassium, sodium phosphates  1 packet Oral QID (AC & HS)     PRN Meds:acetaminophen, dextrose 50%, dextrose 50%, glucagon (human recombinant), glucose, glucose, LORazepam, ondansetron, oxyCODONE-acetaminophen, sodium chloride 0.9%     Review of Systems   Constitutional: Positive for activity change.   Neurological: Positive for weakness.   Psychiatric/Behavioral: Positive for confusion.   All other systems reviewed and are negative.    Objective:     Vital Signs (Most Recent):  Temp: 97.5 °F (36.4 °C) (11/12/18 0400)  Pulse: 64 (11/12/18 0530)  Resp: 15 (11/12/18 0530)  BP: (!) 119/58  (11/12/18 0530)  SpO2: 99 % (11/12/18 0530) Vital Signs (24h Range):  Temp:  [97.4 °F (36.3 °C)-98.9 °F (37.2 °C)] 97.5 °F (36.4 °C)  Pulse:  [63-92] 64  Resp:  [12-39] 15  SpO2:  [82 %-100 %] 99 %  BP: ()/() 119/58     Weight: 50.4 kg (111 lb 1.8 oz)  Body mass index is 19.68 kg/m².  Body surface area is 1.5 meters squared.      Intake/Output Summary (Last 24 hours) at 11/12/2018 0640  Last data filed at 11/12/2018 0600  Gross per 24 hour   Intake 5561.67 ml   Output 3305 ml   Net 2256.67 ml       Physical Exam   Constitutional: No distress.   Cardiovascular: Normal rate, regular rhythm and normal heart sounds.   Pulmonary/Chest: No respiratory distress. She has no wheezes.   Musculoskeletal: Normal range of motion.   Skin: She is not diaphoretic. No erythema.   Nursing note and vitals reviewed.  decreased BS left    Significant Labs:     Lab Results   Component Value Date    WBC 11.50 11/12/2018    HGB 9.7 (L) 11/12/2018    HCT 29.9 (L) 11/12/2018    MCV 91 11/12/2018     (H) 11/12/2018         Assessment/Plan:     Active Diagnoses:    Diagnosis Date Noted POA    PRINCIPAL PROBLEM:  Pleural effusion [J90] 10/29/2018 Yes    Atelectasis [J98.11] 11/08/2018 Yes    Abnormal CXR [R93.89] 11/08/2018 Yes    Moderate malnutrition [E44.0] 11/05/2018 Yes    Wheeze [R06.2] 10/29/2018 Yes    Anorexia [R63.0] 10/29/2018 Yes    SOB (shortness of breath) [R06.02] 10/29/2018 Yes    Pneumonia of left lower lobe due to infectious organism [J18.1] 10/27/2018 Yes    Hypothyroidism due to Hashimoto's thyroiditis [E03.8, E06.3] 10/27/2018 Yes    Essential hypertension [I10] 10/27/2018 Yes    Generalized anxiety disorder [F41.1] 10/27/2018 Yes    Acute respiratory failure with hypoxia [J96.01] 10/27/2018 Yes    Tobacco abuse [Z72.0] 10/27/2018 Yes      Problems Resolved During this Admission:     ANemia stable   Lung adenocarcinoma : treatment options to be confirmed depending on recovery  Tolerating  antimicrobials for pneumoonia    Marsha Knox MD  Hematology/Oncology  [facility]

## 2018-11-12 NOTE — PROGRESS NOTES
Patient assisted back to bed and CT pulled by Dr. Jones, Vaseline gauze/ gauze drsg applied and secured with medipore tape. Stat CXR ordered. Will continue to monitor.

## 2018-11-12 NOTE — PLAN OF CARE
Spoke with the pt's , the pt and Dr Suarez regarding status and hospice. The pt made herself a DNR and they want home hospice. She is requesting to meet with hospice the morning instead of today because she is feeling so over-whelmed. Dr Colon, her PCP is the medical director of [facility] and that's who the pt is requesting to use.    I had the pt choice/disclosure signed and placed in the pt's blue folder.   Order placed for [facility] services....WENDI Christianson       11/12/18 7579   Post-Acute Status   Post-Acute Authorization Home Health/Hospice

## 2018-11-12 NOTE — PROGRESS NOTES
After informed consent, time out, sedation by anesthesia- pt had bronchsocpy to clear retained secretions.  Larynx and tracheobronchial tree were remarkable for excess thick sl pale secretions.  No blood loss nor complications.  Culture from wash submitted.  Progress Note  PULMONARY    Admit Date: 10/26/2018   11/12/2018      SUBJECTIVE:     Oct 30, breathing better, some appetite.  Oct 31, no c/o, tried pull out lines last pm, denies sob.  Nov 1, no c/o, says breathing and eating ok?  Nov 5, post pleurodesis. Confused.  No c/o  Nov 6, less confused.  No c/o  Nov 7, pleasant. No c/o.  Nov 8 , no c/0  Nov 9, same, no c/o  Nov 10, no c/o- needed 100% 02 again with cxr vol loss  Nov 11, no c/o - nc, not sob,   November 12th, no complaints, she is eating well.  Her voice is very muffled.  Her cough is ineffective.    PFSH and Allergies reviewed.    OBJECTIVE:     Vitals (Most recent):  Vitals:    11/12/18 1300   BP: (!) 104/54   Pulse: (!) 59   Resp: 14   Temp:        Vitals (24 hour range):  Temp:  [97.2 °F (36.2 °C)-98.9 °F (37.2 °C)]   Pulse:  [59-92]   Resp:  [13-39]   BP: ()/()   SpO2:  [82 %-100 %]       Intake/Output Summary (Last 24 hours) at 11/12/2018 1314  Last data filed at 11/12/2018 1135  Gross per 24 hour   Intake 5760 ml   Output 4335 ml   Net 1425 ml          Physical Exam:  The patient's neuro status (alertness,orientation,cognitive function,motor skills,), pharyngeal exam (oral lesions, hygiene, abn dentition,), Neck (jvd,mass,thyroid,nodes in neck and above/below clavicle),RESPIRATORY(symmetry,effort,fremitus,percussion,auscultation),  Cor(rhythm,heart tones including gallops,perfusion,edema)ABD(distention,hepatic&splenomegaly,tenderness,masses), Skin(rash,cyanosis),Psyc(affect,judgement,).  Exam negative except for these pertinent findings:    Alert, sl confused, left chest tube removed., decreased bs on the left side is present.  No distress  Radiographs reviewed: view by direct vision   cxr with reasonable aeration left lung  November 12th    Results for orders placed during the hospital encounter of 10/26/18   X-Ray Chest 1 View    Narrative EXAMINATION:  XR CHEST 1 VIEW    CLINICAL HISTORY:  post thoracentesis;    TECHNIQUE:  Single frontal view of the chest was performed.    COMPARISON:  Chest radiograph 10/29/2018; CT dated 10/30/2018    FINDINGS:  Bibasilar airspace disease.  Normal size heart. Indistinct pulmonary vasculature. Blunted costophrenic angles.  Left apical pneumothorax.      Impression 1. Post thoracentesis with reduction in size of left pleural effusion and development of apical pneumothorax.  This is more conspicuous on subsequent CT.  2. Small right pleural effusion.  3. Bibasilar airspace disease which could reflect atelectasis, pneumonia, edema.      Electronically signed by: Demar Ann  Date:    10/30/2018  Time:    10:33   ]    Labs     Recent Labs   Lab 11/12/18  0517   WBC 11.50   HGB 9.7*   HCT 29.9*   *   Electronically reviewed and signed by:   Ursula Chow MD   Signed on 10/30/18 at 13:20   Pathologist review of pleural fluid cell count:   The cytospin shows a predominance of mature appearing lymphocytes   with rare neutrophils and eosinophils seen in the background as well   as an occasional atypical mesothelial cell, favor reactive.     Correlate clinically.  Recent Labs   Lab 11/12/18  0326      K 4.9   CL 98   CO2 28   BUN 8   CREATININE 0.6   GLU 95   CALCIUM 8.1*   MG 2.0   PHOS 3.0   AST 81*   *   ALKPHOS 285*   BILITOT 0.2   PROT 4.6*   ALBUMIN 1.8*   No results for input(s): PH, PCO2, PO2, HCO3 in the last 24 hours.  Microbiology Results (last 7 days)     Procedure Component Value Units Date/Time    Culture, Respiratory with Gram Stain [[id number]] Collected:  11/08/18 1258    Order Status:  Completed Specimen:  Respiratory from Bronchial Wash Updated:  11/12/18 0742     Respiratory Culture Normal respiratory christianne     Gram  Stain (Respiratory) Few WBC's     Gram Stain (Respiratory) Rare Gram positive cocci    Culture, Respiratory with Gram Stain [[id number]] Collected:  11/10/18 0815    Order Status:  Completed Specimen:  Respiratory from Bronchial Wash Updated:  11/12/18 0742     Respiratory Culture Normal respiratory christianne     Gram Stain (Respiratory) >10 epithelial cells per low power field     Gram Stain (Respiratory) Many WBC's     Gram Stain (Respiratory) Few Gram positive cocci        Impression       1. There is no intracranial hemorrhage or mass.  There is no pathologic enhancement in the brain.  2. Brain volume is normal.  There is no hydrocephalus or midline shift.  3. There is mild nonspecific and age indeterminate periventricular and subcortical white matter disease.  These findings likely reflect sequelae of chronic small vessel ischemic change.  Additionally, there are few small regions of high signal intensity on the diffusion sequence in the left frontal and parietal white matter.  These regions are also FLAIR and T2 hyperintense which is somewhat nonspecific but may reflect some degree of T2 shine through and/or subacute ischemic change.  There is no large vascular territory infarction.  This report was flagged in Epic as abnormal.      Electronically signed by: Tuan Finn MD  Date: 11/09/2018  Time: 11:13       Impression:  Active Hospital Problems    Diagnosis  POA    *Pleural effusion [J90]  Yes    Atelectasis [J98.11]  Yes    Abnormal CXR [R93.89]  Yes    Moderate malnutrition [E44.0]  Yes    Wheeze [R06.2]  Yes    Anorexia [R63.0]  Yes    SOB (shortness of breath) [R06.02]  Yes    Pneumonia of left lower lobe due to infectious organism [J18.1]  Yes    Hypothyroidism due to Hashimoto's thyroiditis [E03.8, E06.3]  Yes    Essential hypertension [I10]  Yes    Generalized anxiety disorder [F41.1]  Yes    Acute respiratory failure with hypoxia [J96.01]  Yes    Tobacco abuse [Z72.0]  Yes      Resolved  Hospital Problems   No resolved problems to display.               Plan:     Oct 30, fluid was lymph predominate exudate.  Cancer concerning.  F/u cxr am - if fluid building up would recommend thoracoscopic procedure with Dr Jones.  Cytology pending.  procalcitonin was very low.  Check d dimer and bnp am.  Discussed with pt and family.  Oct 31, bnp up, cytospin cells not felt malignant - cytology pending.  D dimer good.  cxr left effusion increasing. Dose lasix, check echo.  May need Dr Jones for procedure?  F/u cxr am to decide.    Nov 1, echo na, cxr worsening - fluid rapidly returning.  S. Maltophilia in sputum but procalcitonin was low.  Cytology na.  Discussed with Dr Suarez.      Pt maybe a little better but is non ambulatory and frail ( too frail to do therapy).  Will dose lasix 20, ask Dr Jones to see.  Pt wanting to go home????    lft sl up and co2 rising.    Nov 5, cytology with adenoca c/w lung. Post pleurodysis now.     Consider oncology consult to facilitate disposition.  Pt not thriving.    Nov 6, min confused, looks to be progressing, needs mobilize.      Nov 7, lft up post op with ast 449, otherwise same.  Looks frail.  Nov 8 left chest opacified with vol loss, bronch asap to clear out.  Pt not thriving.  Nov 9,   cxr with left lung aerated, pt not thriving. Discussed with .  [place]ristow not good.     Nov 10, needed another bronch , cultures negative from last, will do nt suction, dose prednisone x1 (decreases pleurodesis success), nt suction and f/u cxr.  If pt cannot clear secretions , she will not survive into future.    Mri neg for mets but has ischemic dz.    Nov 11, cough ineffective, pt refusing nt suction, and non ambulatory.  Discussed situation looks acutely terminal - just matter of time if this is best she can do.  Expect will get pneumonia soon or resp failure or just failure to thrive.   in room.     Will repeat steroid low dose.  Encouraged pt/ to consider code  status.  Will ask speech to see - she cannot cough?    November 12th, patient is dramatically diminished breath sounds in the left chest even after chest tube removed.  Her cough is ineffective.  Plan to continue on low-dose prednisone.  It appears as though she may be going home with hospice

## 2018-11-12 NOTE — PLAN OF CARE
11/11/18 1914   Patient Assessment/Suction   Level of Consciousness (AVPU) alert   Respiratory Effort Unlabored   Expansion/Accessory Muscles/Retractions no use of accessory muscles   All Lung Fields Breath Sounds diminished   ROICO Breath Sounds coarse   RUL Breath Sounds coarse   Rhythm/Pattern, Respiratory depth regular;pattern regular;unlabored   Cough Frequency infrequent   Cough Type weak;productive;congested   Sputum Amount small   Sputum Color clear   Sputum Consistency thick   PRE-TX-O2-ETCO2   O2 Device (Oxygen Therapy) nasal cannula w/ humidification   Flow (L/min) 3   SpO2 96 %   Pulse Oximetry Type Intermittent   $ Pulse Oximetry - Multiple Charge Pulse Oximetry - Multiple   Pulse 79   Resp (!) 22   Aerosol Therapy   $ Aerosol Therapy Charges Aerosol Treatment   Respiratory Treatment Status given   SVN/Inhaler Treatment Route mask;with air   Position During Treatment HOB at 45 degrees   Patient Tolerance good   Post-Treatment   Post-treatment Heart Rate (beats/min) 82   Post-treatment Resp Rate (breaths/min) 26   All Fields Breath Sounds unchanged   Incentive Spirometer   $ Incentive Spirometer Charges done with encouragement   Administration (Incentive Spirometer) done with encouragement   Number of Repetitions (Incentive Spirometer) 10   Level (mL) (Incentive Spirometer) 500   Patient Tolerance fair   Vibratory PEP Therapy   $ Vibratory PEP Charges Aerobika Therapy   Administration done with encouragement   Number of Repetitions 12   Pt tolerates NC and treatments well. Pt has great effort with Aerobika. Pt needs encouragement with IS.

## 2018-11-12 NOTE — PROGRESS NOTES
Spoke with Dr. Suarez concerning patient c/o anxiety and request for more meds. Orders received and placed on chart to change Ativan to 0.5 mg every 6 hours PRN.

## 2018-11-12 NOTE — PROGRESS NOTES
Progress Note  Hospital Medicine  Patient Name:Bertha Kelly  MRN:  [id number]  Patient Class: IP- Inpatient  Admit Date: 10/26/2018  Length of Stay: 16 days  Expected Discharge Date:   Attending Physician: Vicki Suarez MD  Primary Care Provider:  James Colon MD    SUBJECTIVE:     Principal Problem: Pleural effusion  Initial history of present illness: Bertha Kelly is a 67 y/o female with a PMHx of HTN and thyroid disease who presented to the ED today with c/o SOB which began 3 weeks ago and has progressively gotten worse.  Pt states that she becomes SOB with activity.  She reports that she received a pneumonia vaccine at her doctor's office about a month ago and began having some chest congestion and cough a few days later.  She is a current smoker of about 1/2 pack daily.  Reports increased fatigue and chills, but denies fever, chest pain, N/V, and dysuria.  Reports weight loss of a few pounds in the past 3 weeks due to poor appetite, but states that prior to this illness, weight was stable and she had a very good appetite.  Pt's CXR reveals a LLL pneumonia and was noted to be mildly hypoxic initially requiring supplemental oxygen.  She will be admitted to the service of hospital medicine for further treatment.    PMH/PSH/SH/FH/Meds: reviewed.    Symptoms/Review of Systems: In ICU s/p VATS, left pleural biopsy, lung biopsy and left pleurodesis. Dr. Jones just removed left sided chest tube. Patient frail and weak and not thriving.  Diet: Cardiac  Activity level: Up with assistance  Pain:  Patient reports no pain.       OBJECTIVE:   Vital Signs (Most Recent):      Temp: 97.2 °F (36.2 °C) (11/12/18 0720)  Pulse: 78 (11/12/18 0800)  Resp: (!) 21 (11/12/18 0800)  BP: (!) 140/62 (11/12/18 0800)  SpO2: 97 % (11/12/18 0800)       Vital Signs Range (Last 24H):  Temp:  [97.2 °F (36.2 °C)-98.9 °F (37.2 °C)]   Pulse:  [62-92]   Resp:  [13-39]   BP: ()/()   SpO2:  [82 %-100 %]     Weight: 50.4 kg (111 lb  1.8 oz)  Body mass index is 19.68 kg/m².    Intake/Output Summary (Last 24 hours) at 11/12/2018 0907  Last data filed at 11/12/2018 0902  Gross per 24 hour   Intake 5455 ml   Output 3425 ml   Net 2030 ml     Physical Examination:  Constitutional: She is oriented to person, place, and time. She appears well-developed and well-nourished. No distress.   HENT:   Head: Normocephalic and atraumatic.   Eyes: Pupils are equal, round, and reactive to light.   Neck: Neck supple. No thyromegaly present.   Cardiovascular: Normal rate and regular rhythm. Exam reveals no gallop and no friction rub.   No murmur heard.  Pulmonary/Chest: No respiratory distress. She has wheezes.  Abdominal: Soft. Bowel sounds are normal. She exhibits no distension. There is no tenderness. There is no guarding.   Musculoskeletal: Normal range of motion. She exhibits no edema.   Neurological: She is alert and oriented to person, place, and time.   Skin: Skin is warm and dry. No erythema.   Psychiatric: She has a normal mood and affect.     CBC:  Recent Labs   Lab 11/10/18  0423 11/11/18 0319 11/12/18  0517   WBC 11.30 12.30 11.50   RBC 3.89* 3.46* 3.27*   HGB 11.5* 10.3* 9.7*   HCT 35.1* 31.3* 29.9*   * 479* 480*   MCV 90 91 91   MCH 29.5 29.7 29.8   MCHC 32.7 32.8 32.6   BMP  Recent Labs   Lab 11/10/18  0423 11/11/18 0319 11/12/18  0326    146* 95    139 136   K 3.8 4.0 4.9   CL 94* 97 98   CO2 34* 34* 28   BUN 6* 6* 8   CREATININE 0.6 0.6 0.6   CALCIUM 8.8 8.4* 8.1*   MG 1.6 1.9 2.0      Diagnostic Results:  Microbiology Results (last 7 days)     Procedure Component Value Units Date/Time    Culture, Respiratory with Gram Stain [[id number]] Collected:  11/08/18 1258    Order Status:  Completed Specimen:  Respiratory from Bronchial Wash Updated:  11/12/18 0742     Respiratory Culture Normal respiratory christianne     Gram Stain (Respiratory) Few WBC's     Gram Stain (Respiratory) Rare Gram positive cocci    Culture, Respiratory with  Gram Stain [[id number]] Collected:  11/10/18 0815    Order Status:  Completed Specimen:  Respiratory from Bronchial Wash Updated:  11/12/18 0742     Respiratory Culture Normal respiratory christianne     Gram Stain (Respiratory) >10 epithelial cells per low power field     Gram Stain (Respiratory) Many WBC's     Gram Stain (Respiratory) Few Gram positive cocci         MRI Brain  Impression:       1. There is no intracranial hemorrhage or mass.  There is no pathologic enhancement in the brain.  2. Brain volume is normal.  There is no hydrocephalus or midline shift.  3. There is mild nonspecific and age indeterminate periventricular and subcortical white matter disease.  These findings likely reflect sequelae of chronic small vessel ischemic change.  Additionally, there are few small regions of high signal intensity on the diffusion sequence in the left frontal and parietal white matter.  These regions are also FLAIR and T2 hyperintense which is somewhat nonspecific but may reflect some degree of T2 shine through and/or subacute ischemic change.  There is no large vascular territory infarction.         CXR: The cardiomediastinal silhouette is normal in appearance.  No pulmonary vascular congestion appreciated. There is dense airspace consolidative change present at the left lung base and there is patchy segmental airspace opacity in the left mid and upper lung zones.  Differential considerations include pneumonia and aspiration.  Underlying pleural fluid cannot be excluded.  An underlying mass would be difficult to exclude.  Serial radiography will be necessary to ensure resolution and exclude an underlying lesion.  There is an age-indeterminate, possibly remote, right posterior 10th rib fracture.    CXR:  Unchanged small left apical pneumothorax. Unchanged moderate left and small right pleural effusions and multifocal airspace disease.    CT chest:  Small left pneumothorax status post preceding left thoracentesis.  Less  than 10%.  Moderate left and small right pleural effusions and moderate left lung and mild right lower lobe consolidation/atelectasis.  Enlarged prevascular mediastinal lymph node.  Additional nodular opacities at the cardiac apex which may represent additional prominent lymph nodes, or possibly pleural based nodules.  Moderate emphysema.  A distinct lung mass is not visible however follow-up to resolution of the pulmonary consolidation is recommended to evaluate for on the underlying lung mass, considering the enlarged mediastinal lymph node.  Further evaluation with CT chest with contrast may also be useful if the patient can receive IV contrast. 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    CXR: Unchanged small left apical pneumothorax. Unchanged moderate left and small right pleural effusions and multifocal airspace disease.    CXR: Continued dense infiltrate in the left mid and lower lung field with atelectasis and moderate to large left pleural effusion which obscures the heart size.  Small infiltrate at the right lung base and small right pleural effusion as well.    ECHO:  · Left ventricle ejection fraction is at 60%  · No wall motion abnormalities.  · Grade I (mild) left ventricular diastolic dysfunction consistent with impaired relaxation.  · LA pressure is normal.  · Mitral valve is mildly sclerotic  · There is a moderate left pleural effusion.  · Technically very difficult study.    CXR: Increased opacification left hemithorax suggesting atelectasis.    CT chest abdomen and pelvis with and without contrast:  1. Left hilar masslike opacity concerning for primary neoplasm.  Concern for pleural and mediastinal lymph node metastases.  Local anatomic considerations as described.  2. Small complex left pleural effusion with chest tube in place.  Moderate  volume right pleural effusion.  3. Emphysema.  4. Porcelain gallbladder.  Associated increased risk of gallbladder malignancy.    RUQ abdominal US:  Calcified gallbladder wall (porcelain gallbladder), which reportedly has a small increased risk of development of gallbladder cancer. Moderately dilated common bile duct, which is nonspecific.  Correlation with obstructive symptomatology and laboratory values recommended.    CXR: Significant improvement in aeration of the left lung.  Left chest tube in place and small bilateral pleural effusions.    Bronchoscopy: After informed consent and time out and sedation by anesthesia- pt had bronchoscopy.  ebl 0 and no complications.  Copious thick secretions noted in left airways.  Washes done to clear completely.  cutlure submitted.    MRI brain:   1. There is no intracranial hemorrhage or mass.  There is no pathologic enhancement in the brain.  2. Brain volume is normal.  There is no hydrocephalus or midline shift.  3. There is mild nonspecific and age indeterminate periventricular and subcortical white matter disease.  These findings likely reflect sequelae of chronic small vessel ischemic change.  Additionally, there are few small regions of high signal intensity on the diffusion sequence in the left frontal and parietal white matter.  These regions are also FLAIR and T2 hyperintense which is somewhat nonspecific but may reflect some degree of T2 shine through and/or subacute ischemic change.  There is no large vascular territory infarction.    CXR: Left chest tube in place without residual pneumothorax or pleural effusion.  Continued infiltrate atelectasis of the left lower lobe however.  Right base atelectasis and infiltrate with moderate size right pleural effusion the    CXR: Left chest tube removed without recurrent pneumothorax or pleural effusion seen.  There remains however bilateral lower lobe infiltrates and a moderate right pleural effusion.    Assessment/Plan:      * Acute respiratory failure with hypoxia  Large left para-pneumonic effusion s/p thoracentesis  Lung Adenocarcinoma s/p VATS, left pleural biopsy, lung biopsy and left pleurodesis     Close monitoring in ICU. Follow pulmonary and CTS recommendations.   Continue supplemental oxygen as needed.  Nebulizer treatments   Monitor and treat as clinically indicated.  Dr. Knox is following.       Tobacco abuse     Smoking cessation counseling performed. Dangers of cigarette smoking were reviewed with patient in detail and patient was encouraged to quit.      Generalized anxiety disorder     Chronic; currently controlled.  Continue home regimen.      Essential hypertension     Chronic; stable.  Continue chronic meds.      Hypothyroidism due to Hashimoto's thyroiditis     Chronic; pt states controlled.  Continue levothyroxine.      Pneumonia of left lower lobe due to infectious organism     Presents with SOB; LLL pneumonia identified on CXR.    Initiate treatment regimen for CAP, which includes a beta lactam and macrolide.   IV Rocephin and IV azithromycin.  Nebulizer treatments       Worsening Liver enzymes - improving  Trend LFTs. Hepatitis panel is negative and RUQ abdominal US results reviewed.    I had a long discussion with patient and . Patient understands, she is tired and weak and may not withstand chemo. Sge agreed for DNR code status and also agreeable for home hospice arrangement pending hospice meeting with children in am. Discussed with CM to arrange family meeting with hospice. Time spent in care of the patient, counseling and coordination of care (Greater than 50% spent in direct face to face contact): 20 min.    DVT prophylaxis: Lovenox 40 mg Sq q day.    Vicki Suarez MD  Department of Hospital Medicine   [facility]

## 2018-11-12 NOTE — PLAN OF CARE
Problem: Patient Care Overview  Goal: Plan of Care Review  Outcome: Ongoing (interventions implemented as appropriate)  Pt on 3L NC with Q6 duoneb treatments, CPT with aerobika, IS, pt achieves 500 on IS, and NT suction. No suction is needed at this time.

## 2018-11-13 LAB
ALBUMIN SERPL BCP-MCNC: 1.8 G/DL
ALP SERPL-CCNC: 343 U/L
ALT SERPL W/O P-5'-P-CCNC: 126 U/L
ANION GAP SERPL CALC-SCNC: 9 MMOL/L
AST SERPL-CCNC: 126 U/L
BASOPHILS # BLD AUTO: 0.1 K/UL
BASOPHILS NFR BLD: 0.6 %
BILIRUB SERPL-MCNC: 0.2 MG/DL
BUN SERPL-MCNC: 8 MG/DL
CALCIUM SERPL-MCNC: 8.6 MG/DL
CHLORIDE SERPL-SCNC: 97 MMOL/L
CO2 SERPL-SCNC: 31 MMOL/L
CREAT SERPL-MCNC: 0.6 MG/DL
DIFFERENTIAL METHOD: ABNORMAL
EOSINOPHIL # BLD AUTO: 0.1 K/UL
EOSINOPHIL NFR BLD: 0.5 %
ERYTHROCYTE [DISTWIDTH] IN BLOOD BY AUTOMATED COUNT: 13.7 %
EST. GFR  (AFRICAN AMERICAN): >60 ML/MIN/1.73 M^2
EST. GFR  (NON AFRICAN AMERICAN): >60 ML/MIN/1.73 M^2
GLUCOSE SERPL-MCNC: 107 MG/DL
HCT VFR BLD AUTO: 36.9 %
HGB BLD-MCNC: 12 G/DL
LYMPHOCYTES # BLD AUTO: 1.1 K/UL
LYMPHOCYTES NFR BLD: 7.3 %
MAGNESIUM SERPL-MCNC: 1.9 MG/DL
MCH RBC QN AUTO: 29.5 PG
MCHC RBC AUTO-ENTMCNC: 32.5 G/DL
MCV RBC AUTO: 91 FL
MONOCYTES # BLD AUTO: 0.9 K/UL
MONOCYTES NFR BLD: 6 %
NEUTROPHILS # BLD AUTO: 12.7 K/UL
NEUTROPHILS NFR BLD: 85.6 %
PHOSPHATE SERPL-MCNC: 4.4 MG/DL
PLATELET # BLD AUTO: 492 K/UL
PMV BLD AUTO: 7.9 FL
POTASSIUM SERPL-SCNC: 4.8 MMOL/L
PROT SERPL-MCNC: 5.3 G/DL
RBC # BLD AUTO: 4.07 M/UL
SODIUM SERPL-SCNC: 137 MMOL/L
WBC # BLD AUTO: 14.8 K/UL

## 2018-11-13 PROCEDURE — 25000003 PHARM REV CODE 250: Performed by: NURSE PRACTITIONER

## 2018-11-13 PROCEDURE — 84100 ASSAY OF PHOSPHORUS: CPT

## 2018-11-13 PROCEDURE — 25000003 PHARM REV CODE 250: Performed by: INTERNAL MEDICINE

## 2018-11-13 PROCEDURE — 25000242 PHARM REV CODE 250 ALT 637 W/ HCPCS: Performed by: INTERNAL MEDICINE

## 2018-11-13 PROCEDURE — 94640 AIRWAY INHALATION TREATMENT: CPT

## 2018-11-13 PROCEDURE — 27000221 HC OXYGEN, UP TO 24 HOURS

## 2018-11-13 PROCEDURE — 99232 SBSQ HOSP IP/OBS MODERATE 35: CPT | Mod: ,,, | Performed by: INTERNAL MEDICINE

## 2018-11-13 PROCEDURE — 99900035 HC TECH TIME PER 15 MIN (STAT)

## 2018-11-13 PROCEDURE — 80053 COMPREHEN METABOLIC PANEL: CPT

## 2018-11-13 PROCEDURE — 36415 COLL VENOUS BLD VENIPUNCTURE: CPT

## 2018-11-13 PROCEDURE — 63600175 PHARM REV CODE 636 W HCPCS: Performed by: INTERNAL MEDICINE

## 2018-11-13 PROCEDURE — 83735 ASSAY OF MAGNESIUM: CPT

## 2018-11-13 PROCEDURE — 94799 UNLISTED PULMONARY SVC/PX: CPT

## 2018-11-13 PROCEDURE — 85025 COMPLETE CBC W/AUTO DIFF WBC: CPT

## 2018-11-13 PROCEDURE — 20000000 HC ICU ROOM

## 2018-11-13 PROCEDURE — S4991 NICOTINE PATCH NONLEGEND: HCPCS | Performed by: NURSE PRACTITIONER

## 2018-11-13 PROCEDURE — 94664 DEMO&/EVAL PT USE INHALER: CPT

## 2018-11-13 PROCEDURE — 94761 N-INVAS EAR/PLS OXIMETRY MLT: CPT

## 2018-11-13 RX ORDER — IPRATROPIUM BROMIDE AND ALBUTEROL SULFATE 2.5; .5 MG/3ML; MG/3ML
3 SOLUTION RESPIRATORY (INHALATION) EVERY 6 HOURS
Qty: 120 VIAL | Refills: 5 | Status: SHIPPED | OUTPATIENT
Start: 2018-11-13 | End: 2018-11-14

## 2018-11-13 RX ORDER — METOPROLOL TARTRATE 50 MG/1
50 TABLET ORAL 2 TIMES DAILY
Qty: 60 TABLET | Refills: 11 | Status: SHIPPED | OUTPATIENT
Start: 2018-11-13 | End: 2018-11-14

## 2018-11-13 RX ORDER — IPRATROPIUM BROMIDE AND ALBUTEROL SULFATE 2.5; .5 MG/3ML; MG/3ML
3 SOLUTION RESPIRATORY (INHALATION) EVERY 4 HOURS PRN
Status: DISCONTINUED | OUTPATIENT
Start: 2018-11-13 | End: 2018-11-14 | Stop reason: HOSPADM

## 2018-11-13 RX ORDER — PREDNISONE 10 MG/1
10 TABLET ORAL 2 TIMES DAILY
Qty: 20 TABLET | Refills: 0 | Status: SHIPPED | OUTPATIENT
Start: 2018-11-13 | End: 2018-11-14

## 2018-11-13 RX ADMIN — POTASSIUM & SODIUM PHOSPHATES POWDER PACK 280-160-250 MG 1 PACKET: 280-160-250 PACK at 06:11

## 2018-11-13 RX ADMIN — ENOXAPARIN SODIUM 40 MG: 100 INJECTION SUBCUTANEOUS at 06:11

## 2018-11-13 RX ADMIN — OXYCODONE HYDROCHLORIDE AND ACETAMINOPHEN 1 TABLET: 7.5; 325 TABLET ORAL at 03:11

## 2018-11-13 RX ADMIN — LORAZEPAM 0.5 MG: 0.5 TABLET ORAL at 12:11

## 2018-11-13 RX ADMIN — PANTOPRAZOLE SODIUM 40 MG: 40 TABLET, DELAYED RELEASE ORAL at 09:11

## 2018-11-13 RX ADMIN — LEVOTHYROXINE SODIUM 50 MCG: 50 TABLET ORAL at 06:11

## 2018-11-13 RX ADMIN — ESCITALOPRAM 20 MG: 10 TABLET, FILM COATED ORAL at 09:11

## 2018-11-13 RX ADMIN — PREDNISONE 10 MG: 5 TABLET ORAL at 09:11

## 2018-11-13 RX ADMIN — METOPROLOL TARTRATE 50 MG: 50 TABLET ORAL at 08:11

## 2018-11-13 RX ADMIN — FUROSEMIDE 20 MG: 20 TABLET ORAL at 09:11

## 2018-11-13 RX ADMIN — IPRATROPIUM BROMIDE AND ALBUTEROL SULFATE 3 ML: .5; 3 SOLUTION RESPIRATORY (INHALATION) at 01:11

## 2018-11-13 RX ADMIN — PREDNISONE 10 MG: 5 TABLET ORAL at 08:11

## 2018-11-13 RX ADMIN — OXYCODONE HYDROCHLORIDE AND ACETAMINOPHEN 1 TABLET: 7.5; 325 TABLET ORAL at 06:11

## 2018-11-13 RX ADMIN — LORAZEPAM 0.5 MG: 0.5 TABLET ORAL at 06:11

## 2018-11-13 RX ADMIN — AMITRIPTYLINE HYDROCHLORIDE 25 MG: 25 TABLET, FILM COATED ORAL at 08:11

## 2018-11-13 RX ADMIN — OXYCODONE HYDROCHLORIDE AND ACETAMINOPHEN 1 TABLET: 7.5; 325 TABLET ORAL at 10:11

## 2018-11-13 RX ADMIN — NICOTINE 1 PATCH: 14 PATCH, EXTENDED RELEASE TRANSDERMAL at 09:11

## 2018-11-13 RX ADMIN — LORAZEPAM 0.5 MG: 0.5 TABLET ORAL at 08:11

## 2018-11-13 NOTE — PLAN OF CARE
Rolanda with [facility] ([phone]) verified that pt's DME and necessary meds would be delivered to pt's home tomorrow morning.  Pt's daughter will be at the home waiting for the delivery.  Rolanda requested the discharge orders and discharge meds be faxed to their efax at [phone].      11/13/18 8356   Discharge Reassessment   Assessment Type Discharge Planning Reassessment   Discharge Plan A Hospice/home

## 2018-11-13 NOTE — PLAN OF CARE
Phone contact with Rolanda from [facility], [phone].  She stated that plan is for discharge tomorrow as there is no one home today to care for pt as the daughter is at work.  Rolanda stated that they also have to order and arrange for the DME to be delivered to the home.  Pt's daughter reportedly is quitting her job and will be home to care for pt as pt's spouse reportedly works 12 hour shifts.  Rolanda verified that they had signed the paperwork for home hospice.  Updated Georgette ALMODOVAR.     11/13/18 8599   Discharge Reassessment   Assessment Type Discharge Planning Reassessment   Discharge Plan A Hospice/home

## 2018-11-13 NOTE — PLAN OF CARE
11/12/18 2202   Patient Assessment/Suction   Level of Consciousness (AVPU) alert   Respiratory Effort Unlabored   Expansion/Accessory Muscles/Retractions no use of accessory muscles   All Lung Fields Breath Sounds diminished   ROCIO Breath Sounds coarse   RUL Breath Sounds coarse   PRE-TX-O2-ETCO2   O2 Device (Oxygen Therapy) nasal cannula w/ humidification   $ Is the patient on Low Flow Oxygen? Yes   Flow (L/min) 3   SpO2 97 %   Pulse 76   Resp (!) 23   Aerosol Therapy   $ Aerosol Therapy Charges Aerosol Treatment   Respiratory Treatment Status given   SVN/Inhaler Treatment Route mask;with air   Position During Treatment HOB at 45 degrees   Patient Tolerance good   Post-Treatment   Post-treatment Heart Rate (beats/min) 73   Post-treatment Resp Rate (breaths/min) 23   All Fields Breath Sounds unchanged   Incentive Spirometer   Administration (Incentive Spirometer) refused   Vibratory PEP Therapy   Administration refused   Pt tolerates NC and treatments well. Pt has been discussing hospice care with family. Pt refused IS and aerobika at this time.

## 2018-11-13 NOTE — PLAN OF CARE
"Problem: Patient Care Overview  Goal: Plan of Care Review  Outcome: Revised  Sat up in chair for 4 hours this morning. Chest tube removed this am by Dr. Jones, site draining, drsg reinforced. In regard to diagnosis patient states "I knew it wasn't good". Patient made DNR today. Patient wants to sleep, meds adjusted. Safety maintained.      "

## 2018-11-13 NOTE — PLAN OF CARE
Problem: Patient Care Overview  Goal: Plan of Care Review  Outcome: Ongoing (interventions implemented as appropriate)  Ensured patient safety with frequent checks, assessing pain and old chest tube site drainage (dressing changed twice this shift.  Remains on 3 Liters NC and patient remains in a pleasantly confused. Patient remains free of falls and skin intact. Remains with piv intact and LR infusing. A lot of education done on completing hospice, spoke at length in detail to spouse about benefits of hospice. Patient in somber spirits with of grim diagnosis and supportive care given. Will continue to monitor.

## 2018-11-13 NOTE — PROGRESS NOTES
Progress Note  Hospital Medicine  Patient Name:Bertha Kelly  MRN:  [id number]  Patient Class: IP- Inpatient  Admit Date: 10/26/2018  Length of Stay: 17 days  Expected Discharge Date:   Attending Physician: Vicki Suarez MD  Primary Care Provider:  James Colon MD    SUBJECTIVE:     Principal Problem: Pleural effusion  Initial history of present illness: Bertha Kelly is a 67 y/o female with a PMHx of HTN and thyroid disease who presented to the ED today with c/o SOB which began 3 weeks ago and has progressively gotten worse.  Pt states that she becomes SOB with activity.  She reports that she received a pneumonia vaccine at her doctor's office about a month ago and began having some chest congestion and cough a few days later.  She is a current smoker of about 1/2 pack daily.  Reports increased fatigue and chills, but denies fever, chest pain, N/V, and dysuria.  Reports weight loss of a few pounds in the past 3 weeks due to poor appetite, but states that prior to this illness, weight was stable and she had a very good appetite.  Pt's CXR reveals a LLL pneumonia and was noted to be mildly hypoxic initially requiring supplemental oxygen.  She will be admitted to the service of hospital medicine for further treatment.    PMH/PSH/SH/FH/Meds: reviewed.    Symptoms/Review of Systems: In ICU s/p VATS, left pleural biopsy, lung biopsy and left pleurodesis. Looking and feeling better, waiting for home hospice arrangements to be made.  Diet: Cardiac  Activity level: Up with assistance  Pain:  Patient reports no pain.       OBJECTIVE:   Vital Signs (Most Recent):      Temp: 97.9 °F (36.6 °C) (11/13/18 0400)  Pulse: 73 (11/13/18 0739)  Resp: (!) 21 (11/13/18 0739)  BP: (!) 115/51 (11/13/18 0500)  SpO2: 97 % (11/13/18 0739)       Vital Signs Range (Last 24H):  Temp:  [97.9 °F (36.6 °C)-98.7 °F (37.1 °C)]   Pulse:  [56-79]   Resp:  [14-30]   BP: ()/()   SpO2:  [90 %-100 %]     Weight: 50.4 kg (111 lb 1.8  oz)  Body mass index is 19.68 kg/m².    Intake/Output Summary (Last 24 hours) at 11/13/2018 0805  Last data filed at 11/13/2018 0600  Gross per 24 hour   Intake 2946.66 ml   Output 3260 ml   Net -313.34 ml     Physical Examination:  Constitutional: She is oriented to person, place, and time. She appears well-developed and well-nourished. No distress.   HENT:   Head: Normocephalic and atraumatic.   Eyes: Pupils are equal, round, and reactive to light.   Neck: Neck supple. No thyromegaly present.   Cardiovascular: Normal rate and regular rhythm. Exam reveals no gallop and no friction rub.   No murmur heard.  Pulmonary/Chest: No respiratory distress. She has wheezes.  Abdominal: Soft. Bowel sounds are normal. She exhibits no distension. There is no tenderness. There is no guarding.   Musculoskeletal: Normal range of motion. She exhibits no edema.   Neurological: She is alert and oriented to person, place, and time.   Skin: Skin is warm and dry. No erythema.   Psychiatric: She has a normal mood and affect.     CBC:  Recent Labs   Lab 11/11/18  0319 11/12/18  0517 11/13/18  0315   WBC 12.30 11.50 14.80*   RBC 3.46* 3.27* 4.07   HGB 10.3* 9.7* 12.0   HCT 31.3* 29.9* 36.9*   * 480* 492*   MCV 91 91 91   MCH 29.7 29.8 29.5   MCHC 32.8 32.6 32.5   BMP  Recent Labs   Lab 11/11/18  0319 11/12/18  0326 11/13/18  0315   * 95 107    136 137   K 4.0 4.9 4.8   CL 97 98 97   CO2 34* 28 31*   BUN 6* 8 8   CREATININE 0.6 0.6 0.6   CALCIUM 8.4* 8.1* 8.6*   MG 1.9 2.0 1.9      Diagnostic Results:  Microbiology Results (last 7 days)     Procedure Component Value Units Date/Time    Culture, Respiratory with Gram Stain [[id number]] Collected:  11/08/18 1258    Order Status:  Completed Specimen:  Respiratory from Bronchial Wash Updated:  11/12/18 0742     Respiratory Culture Normal respiratory christianne     Gram Stain (Respiratory) Few WBC's     Gram Stain (Respiratory) Rare Gram positive cocci    Culture, Respiratory with  Gram Stain [[id number]] Collected:  11/10/18 0815    Order Status:  Completed Specimen:  Respiratory from Bronchial Wash Updated:  11/12/18 0742     Respiratory Culture Normal respiratory christianne     Gram Stain (Respiratory) >10 epithelial cells per low power field     Gram Stain (Respiratory) Many WBC's     Gram Stain (Respiratory) Few Gram positive cocci         MRI Brain  Impression:       1. There is no intracranial hemorrhage or mass.  There is no pathologic enhancement in the brain.  2. Brain volume is normal.  There is no hydrocephalus or midline shift.  3. There is mild nonspecific and age indeterminate periventricular and subcortical white matter disease.  These findings likely reflect sequelae of chronic small vessel ischemic change.  Additionally, there are few small regions of high signal intensity on the diffusion sequence in the left frontal and parietal white matter.  These regions are also FLAIR and T2 hyperintense which is somewhat nonspecific but may reflect some degree of T2 shine through and/or subacute ischemic change.  There is no large vascular territory infarction.         CXR: The cardiomediastinal silhouette is normal in appearance.  No pulmonary vascular congestion appreciated. There is dense airspace consolidative change present at the left lung base and there is patchy segmental airspace opacity in the left mid and upper lung zones.  Differential considerations include pneumonia and aspiration.  Underlying pleural fluid cannot be excluded.  An underlying mass would be difficult to exclude.  Serial radiography will be necessary to ensure resolution and exclude an underlying lesion.  There is an age-indeterminate, possibly remote, right posterior 10th rib fracture.    CXR:  Unchanged small left apical pneumothorax. Unchanged moderate left and small right pleural effusions and multifocal airspace disease.    CT chest:  Small left pneumothorax status post preceding left thoracentesis.  Less  than 10%.  Moderate left and small right pleural effusions and moderate left lung and mild right lower lobe consolidation/atelectasis.  Enlarged prevascular mediastinal lymph node.  Additional nodular opacities at the cardiac apex which may represent additional prominent lymph nodes, or possibly pleural based nodules.  Moderate emphysema.  A distinct lung mass is not visible however follow-up to resolution of the pulmonary consolidation is recommended to evaluate for on the underlying lung mass, considering the enlarged mediastinal lymph node.  Further evaluation with CT chest with contrast may also be useful if the patient can receive IV contrast. 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    CXR: Unchanged small left apical pneumothorax. Unchanged moderate left and small right pleural effusions and multifocal airspace disease.    CXR: Continued dense infiltrate in the left mid and lower lung field with atelectasis and moderate to large left pleural effusion which obscures the heart size.  Small infiltrate at the right lung base and small right pleural effusion as well.    ECHO:  · Left ventricle ejection fraction is at 60%  · No wall motion abnormalities.  · Grade I (mild) left ventricular diastolic dysfunction consistent with impaired relaxation.  · LA pressure is normal.  · Mitral valve is mildly sclerotic  · There is a moderate left pleural effusion.  · Technically very difficult study.    CXR: Increased opacification left hemithorax suggesting atelectasis.    CT chest abdomen and pelvis with and without contrast:  1. Left hilar masslike opacity concerning for primary neoplasm.  Concern for pleural and mediastinal lymph node metastases.  Local anatomic considerations as described.  2. Small complex left pleural effusion with chest tube in place.  Moderate  volume right pleural effusion.  3. Emphysema.  4. Porcelain gallbladder.  Associated increased risk of gallbladder malignancy.    RUQ abdominal US:  Calcified gallbladder wall (porcelain gallbladder), which reportedly has a small increased risk of development of gallbladder cancer. Moderately dilated common bile duct, which is nonspecific.  Correlation with obstructive symptomatology and laboratory values recommended.    CXR: Significant improvement in aeration of the left lung.  Left chest tube in place and small bilateral pleural effusions.    Bronchoscopy: After informed consent and time out and sedation by anesthesia- pt had bronchoscopy.  ebl 0 and no complications.  Copious thick secretions noted in left airways.  Washes done to clear completely.  cutlure submitted.    MRI brain:   1. There is no intracranial hemorrhage or mass.  There is no pathologic enhancement in the brain.  2. Brain volume is normal.  There is no hydrocephalus or midline shift.  3. There is mild nonspecific and age indeterminate periventricular and subcortical white matter disease.  These findings likely reflect sequelae of chronic small vessel ischemic change.  Additionally, there are few small regions of high signal intensity on the diffusion sequence in the left frontal and parietal white matter.  These regions are also FLAIR and T2 hyperintense which is somewhat nonspecific but may reflect some degree of T2 shine through and/or subacute ischemic change.  There is no large vascular territory infarction.    CXR: Left chest tube in place without residual pneumothorax or pleural effusion.  Continued infiltrate atelectasis of the left lower lobe however.  Right base atelectasis and infiltrate with moderate size right pleural effusion the    CXR: Left chest tube removed without recurrent pneumothorax or pleural effusion seen.  There remains however bilateral lower lobe infiltrates and a moderate right pleural effusion.    Assessment/Plan:      * Acute respiratory failure with hypoxia  Large left para-pneumonic effusion s/p thoracentesis  Lung Adenocarcinoma s/p VATS, left pleural biopsy, lung biopsy and left pleurodesis     Close monitoring in ICU. Follow pulmonary and CTS recommendations.   Continue supplemental oxygen as needed.  Nebulizer treatments   Monitor and treat as clinically indicated.  Dr. Knox is following.       Tobacco abuse     Smoking cessation counseling performed. Dangers of cigarette smoking were reviewed with patient in detail and patient was encouraged to quit.      Generalized anxiety disorder     Chronic; currently controlled.  Continue home regimen.      Essential hypertension     Chronic; stable.  Continue chronic meds.      Hypothyroidism due to Hashimoto's thyroiditis     Chronic; pt states controlled.  Continue levothyroxine.      Pneumonia of left lower lobe due to infectious organism     Presents with SOB; LLL pneumonia identified on CXR.    Initiate treatment regimen for CAP, which includes a beta lactam and macrolide.   IV Rocephin and IV azithromycin.  Nebulizer treatments       Worsening Liver enzymes - improving  Trend LFTs. Hepatitis panel is negative and RUQ abdominal US results reviewed.    Home hospice pending arrangements.    DVT prophylaxis: Lovenox 40 mg Sq q day.    Vicki Suarez MD  Department of Hospital Medicine   [facility]

## 2018-11-13 NOTE — PROGRESS NOTES
After informed consent, time out, sedation by anesthesia- pt had bronchsocpy to clear retained secretions.  Larynx and tracheobronchial tree were remarkable for excess thick sl pale secretions.  No blood loss nor complications.  Culture from wash submitted.  Progress Note  PULMONARY    Admit Date: 10/26/2018   11/13/2018      SUBJECTIVE:     Oct 30, breathing better, some appetite.  Oct 31, no c/o, tried pull out lines last pm, denies sob.  Nov 1, no c/o, says breathing and eating ok?  Nov 5, post pleurodesis. Confused.  No c/o  Nov 6, less confused.  No c/o  Nov 7, pleasant. No c/o.  Nov 8 , no c/0  Nov 9, same, no c/o  Nov 10, no c/o- needed 100% 02 again with cxr vol loss  Nov 11, no c/o - nc, not sob,   November 12th, no complaints, she is eating well.  Her voice is very muffled.  Her cough is ineffective.  Nov 13, seems better wrt cough/breathing - could be from steroids.       PFSH and Allergies reviewed.    OBJECTIVE:     Vitals (Most recent):  Vitals:    11/13/18 1620   BP:    Pulse:    Resp:    Temp: 98.3 °F (36.8 °C)       Vitals (24 hour range):  Temp:  [97.9 °F (36.6 °C)-98.7 °F (37.1 °C)]   Pulse:  [56-79]   Resp:  [15-26]   BP: ()/(51-96)   SpO2:  [93 %-100 %]       Intake/Output Summary (Last 24 hours) at 11/13/2018 1709  Last data filed at 11/13/2018 1620  Gross per 24 hour   Intake 3068.33 ml   Output 3520 ml   Net -451.67 ml          Physical Exam:  The patient's neuro status (alertness,orientation,cognitive function,motor skills,), pharyngeal exam (oral lesions, hygiene, abn dentition,), Neck (jvd,mass,thyroid,nodes in neck and above/below clavicle),RESPIRATORY(symmetry,effort,fremitus,percussion,auscultation),  Cor(rhythm,heart tones including gallops,perfusion,edema)ABD(distention,hepatic&splenomegaly,tenderness,masses), Skin(rash,cyanosis),Psyc(affect,judgement,).  Exam negative except for these pertinent findings:    Alert , left chest tube removed., decreased bs on the left side is  present.  No distress  Radiographs reviewed: view by direct vision  cxr with reasonable aeration left lung  November 12th    Results for orders placed during the hospital encounter of 10/26/18   X-Ray Chest 1 View    Narrative EXAMINATION:  XR CHEST 1 VIEW    CLINICAL HISTORY:  post thoracentesis;    TECHNIQUE:  Single frontal view of the chest was performed.    COMPARISON:  Chest radiograph 10/29/2018; CT dated 10/30/2018    FINDINGS:  Bibasilar airspace disease.  Normal size heart. Indistinct pulmonary vasculature. Blunted costophrenic angles.  Left apical pneumothorax.      Impression 1. Post thoracentesis with reduction in size of left pleural effusion and development of apical pneumothorax.  This is more conspicuous on subsequent CT.  2. Small right pleural effusion.  3. Bibasilar airspace disease which could reflect atelectasis, pneumonia, edema.      Electronically signed by: Demar Ann  Date:    10/30/2018  Time:    10:33   ]    Labs     Recent Labs   Lab 11/13/18  0315   WBC 14.80*   HGB 12.0   HCT 36.9*   *   Electronically reviewed and signed by:   Ursula Chow MD   Signed on 10/30/18 at 13:20   Pathologist review of pleural fluid cell count:   The cytospin shows a predominance of mature appearing lymphocytes   with rare neutrophils and eosinophils seen in the background as well   as an occasional atypical mesothelial cell, favor reactive.     Correlate clinically.  Recent Labs   Lab 11/13/18  0315      K 4.8   CL 97   CO2 31*   BUN 8   CREATININE 0.6      CALCIUM 8.6*   MG 1.9   PHOS 4.4   *   *   ALKPHOS 343*   BILITOT 0.2   PROT 5.3*   ALBUMIN 1.8*   No results for input(s): PH, PCO2, PO2, HCO3 in the last 24 hours.  Microbiology Results (last 7 days)     Procedure Component Value Units Date/Time    Culture, Respiratory with Gram Stain [[id number]] Collected:  11/08/18 1258    Order Status:  Completed Specimen:  Respiratory from Bronchial Wash Updated:   11/12/18 0742     Respiratory Culture Normal respiratory christianne     Gram Stain (Respiratory) Few WBC's     Gram Stain (Respiratory) Rare Gram positive cocci    Culture, Respiratory with Gram Stain [[id number]] Collected:  11/10/18 0815    Order Status:  Completed Specimen:  Respiratory from Bronchial Wash Updated:  11/12/18 0742     Respiratory Culture Normal respiratory christianne     Gram Stain (Respiratory) >10 epithelial cells per low power field     Gram Stain (Respiratory) Many WBC's     Gram Stain (Respiratory) Few Gram positive cocci        Impression       1. There is no intracranial hemorrhage or mass.  There is no pathologic enhancement in the brain.  2. Brain volume is normal.  There is no hydrocephalus or midline shift.  3. There is mild nonspecific and age indeterminate periventricular and subcortical white matter disease.  These findings likely reflect sequelae of chronic small vessel ischemic change.  Additionally, there are few small regions of high signal intensity on the diffusion sequence in the left frontal and parietal white matter.  These regions are also FLAIR and T2 hyperintense which is somewhat nonspecific but may reflect some degree of T2 shine through and/or subacute ischemic change.  There is no large vascular territory infarction.  This report was flagged in Epic as abnormal.      Electronically signed by: Tuan Finn MD  Date: 11/09/2018  Time: 11:13       Impression:  Active Hospital Problems    Diagnosis  POA    *Pleural effusion [J90]  Yes    Atelectasis [J98.11]  Yes    Abnormal CXR [R93.89]  Yes    Moderate malnutrition [E44.0]  Yes    Wheeze [R06.2]  Yes    Anorexia [R63.0]  Yes    SOB (shortness of breath) [R06.02]  Yes    Pneumonia of left lower lobe due to infectious organism [J18.1]  Yes    Hypothyroidism due to Hashimoto's thyroiditis [E03.8, E06.3]  Yes    Essential hypertension [I10]  Yes    Generalized anxiety disorder [F41.1]  Yes    Acute respiratory failure  with hypoxia [J96.01]  Yes    Tobacco abuse [Z72.0]  Yes      Resolved Hospital Problems   No resolved problems to display.               Plan:     Oct 30, fluid was lymph predominate exudate.  Cancer concerning.  F/u cxr am - if fluid building up would recommend thoracoscopic procedure with Dr Jones.  Cytology pending.  procalcitonin was very low.  Check d dimer and bnp am.  Discussed with pt and family.  Oct 31, bnp up, cytospin cells not felt malignant - cytology pending.  D dimer good.  cxr left effusion increasing. Dose lasix, check echo.  May need Dr Jones for procedure?  F/u cxr am to decide.    Nov 1, echo na, cxr worsening - fluid rapidly returning.  S. Maltophilia in sputum but procalcitonin was low.  Cytology na.  Discussed with Dr Suarez.      Pt maybe a little better but is non ambulatory and frail ( too frail to do therapy).  Will dose lasix 20, ask Dr Jones to see.  Pt wanting to go home????    lft sl up and co2 rising.    Nov 5, cytology with adenoca c/w lung. Post pleurodysis now.     Consider oncology consult to facilitate disposition.  Pt not thriving.    Nov 6, min confused, looks to be progressing, needs mobilize.      Nov 7, lft up post op with ast 449, otherwise same.  Looks frail.  Nov 8 left chest opacified with vol loss, bronch asap to clear out.  Pt not thriving.  Nov 9,   cxr with left lung aerated, pt not thriving. Discussed with .  [place] not good.     Nov 10, needed another bronch , cultures negative from last, will do nt suction, dose prednisone x1 (decreases pleurodesis success), nt suction and f/u cxr.  If pt cannot clear secretions , she will not survive into future.    Mri neg for mets but has ischemic dz.    Nov 11, cough ineffective, pt refusing nt suction, and non ambulatory.  Discussed situation looks acutely terminal - just matter of time if this is best she can do.  Expect will get pneumonia soon or resp failure or just failure to thrive.   in room.      Will repeat steroid low dose.  Encouraged pt/ to consider code status.  Will ask speech to see - she cannot cough?    November 12th, patient is dramatically diminished breath sounds in the left chest even after chest tube removed.  Her cough is ineffective.  Plan to continue on low-dose prednisone.  It appears as though she may be going home with hospice  Nov 13, pt breathing better, wants resp rx at home.  Would stop inderal and use lopressor with copd problems.      Would keep on prednisone 10 bid after dc.  duoneb qid prn,  metoprotol 50 bid

## 2018-11-13 NOTE — PLAN OF CARE
Problem: Patient Care Overview  Goal: Plan of Care Review  Outcome: Ongoing (interventions implemented as appropriate)  Pt on 3L NC with Q6 duoneb treatments, CPT with jennifer and IS.

## 2018-11-14 VITALS
TEMPERATURE: 98 F | BODY MASS INDEX: 19.69 KG/M2 | WEIGHT: 111.13 LBS | DIASTOLIC BLOOD PRESSURE: 66 MMHG | OXYGEN SATURATION: 96 % | HEIGHT: 63 IN | RESPIRATION RATE: 26 BRPM | SYSTOLIC BLOOD PRESSURE: 146 MMHG | HEART RATE: 69 BPM

## 2018-11-14 PROCEDURE — 63600175 PHARM REV CODE 636 W HCPCS: Performed by: INTERNAL MEDICINE

## 2018-11-14 PROCEDURE — 94761 N-INVAS EAR/PLS OXIMETRY MLT: CPT

## 2018-11-14 PROCEDURE — 25000003 PHARM REV CODE 250: Performed by: NURSE PRACTITIONER

## 2018-11-14 PROCEDURE — 99239 HOSP IP/OBS DSCHRG MGMT >30: CPT | Mod: ,,, | Performed by: INTERNAL MEDICINE

## 2018-11-14 PROCEDURE — 27000221 HC OXYGEN, UP TO 24 HOURS

## 2018-11-14 PROCEDURE — 99900035 HC TECH TIME PER 15 MIN (STAT)

## 2018-11-14 PROCEDURE — 25000003 PHARM REV CODE 250: Performed by: INTERNAL MEDICINE

## 2018-11-14 PROCEDURE — S4991 NICOTINE PATCH NONLEGEND: HCPCS | Performed by: NURSE PRACTITIONER

## 2018-11-14 PROCEDURE — 97803 MED NUTRITION INDIV SUBSEQ: CPT

## 2018-11-14 RX ORDER — POLYETHYLENE GLYCOL 3350 17 G/17G
17 POWDER, FOR SOLUTION ORAL 2 TIMES DAILY
Qty: 60 EACH | Refills: 0 | Status: SHIPPED | OUTPATIENT
Start: 2018-11-14

## 2018-11-14 RX ORDER — IBUPROFEN 200 MG
1 TABLET ORAL DAILY
Refills: 0 | COMMUNITY
Start: 2018-11-14

## 2018-11-14 RX ORDER — METOPROLOL TARTRATE 50 MG/1
50 TABLET ORAL 2 TIMES DAILY
Qty: 60 TABLET | Refills: 0 | Status: SHIPPED | OUTPATIENT
Start: 2018-11-14 | End: 2019-11-14

## 2018-11-14 RX ORDER — IPRATROPIUM BROMIDE AND ALBUTEROL SULFATE 2.5; .5 MG/3ML; MG/3ML
3 SOLUTION RESPIRATORY (INHALATION) EVERY 6 HOURS PRN
Qty: 120 VIAL | Refills: 5 | Status: SHIPPED | OUTPATIENT
Start: 2018-11-14 | End: 2019-11-14

## 2018-11-14 RX ORDER — FUROSEMIDE 20 MG/1
20 TABLET ORAL DAILY
Qty: 30 TABLET | Refills: 0 | Status: SHIPPED | OUTPATIENT
Start: 2018-11-14 | End: 2019-11-14

## 2018-11-14 RX ORDER — OXYCODONE AND ACETAMINOPHEN 7.5; 325 MG/1; MG/1
1 TABLET ORAL EVERY 6 HOURS PRN
Refills: 0
Start: 2018-11-14

## 2018-11-14 RX ORDER — PREDNISONE 10 MG/1
10 TABLET ORAL 2 TIMES DAILY
Qty: 20 TABLET | Refills: 0 | Status: SHIPPED | OUTPATIENT
Start: 2018-11-14 | End: 2018-11-24

## 2018-11-14 RX ADMIN — PREDNISONE 10 MG: 5 TABLET ORAL at 08:11

## 2018-11-14 RX ADMIN — LEVOTHYROXINE SODIUM 50 MCG: 50 TABLET ORAL at 06:11

## 2018-11-14 RX ADMIN — OXYCODONE HYDROCHLORIDE AND ACETAMINOPHEN 1 TABLET: 7.5; 325 TABLET ORAL at 06:11

## 2018-11-14 RX ADMIN — ESCITALOPRAM 20 MG: 10 TABLET, FILM COATED ORAL at 08:11

## 2018-11-14 RX ADMIN — OXYCODONE HYDROCHLORIDE AND ACETAMINOPHEN 1 TABLET: 7.5; 325 TABLET ORAL at 12:11

## 2018-11-14 RX ADMIN — FUROSEMIDE 20 MG: 20 TABLET ORAL at 08:11

## 2018-11-14 RX ADMIN — METOPROLOL TARTRATE 50 MG: 50 TABLET ORAL at 08:11

## 2018-11-14 RX ADMIN — NICOTINE 1 PATCH: 14 PATCH, EXTENDED RELEASE TRANSDERMAL at 08:11

## 2018-11-14 RX ADMIN — PANTOPRAZOLE SODIUM 40 MG: 40 TABLET, DELAYED RELEASE ORAL at 08:11

## 2018-11-14 RX ADMIN — LORAZEPAM 0.5 MG: 0.5 TABLET ORAL at 10:11

## 2018-11-14 RX ADMIN — LORAZEPAM 0.5 MG: 0.5 TABLET ORAL at 04:11

## 2018-11-14 NOTE — PLAN OF CARE
Problem: Patient Care Overview  Goal: Plan of Care Review  Outcome: Ongoing (interventions implemented as appropriate)  Ensured patient safety with frequent checks, assessing pain and old chest tube site drainage (dressing changed twice this shift.  Remains on 3 Liters NC and patient remains in a pleasantly confused. Patient remains free of falls and skin intact. Remains with piv intact , sliv. A lot of education done on hospice, spoke at length in detail to spouse of hospice, patient belongings packed and some info about medication given to the spouse. Patient in somber spirits with of grim diagnosis and supportive care given. Will continue to monitor.

## 2018-11-14 NOTE — PROGRESS NOTES
11/13/18 2106   Patient Assessment/Suction   Level of Consciousness (AVPU) alert   Respiratory Effort Unlabored   Expansion/Accessory Muscles/Retractions no use of accessory muscles   PRE-TX-O2-ETCO2   O2 Device (Oxygen Therapy) nasal cannula w/ humidification   $ Is the patient on Low Flow Oxygen? Yes   Flow (L/min) 3   SpO2 96 %   Pulse Oximetry Type Intermittent   $ Pulse Oximetry - Multiple Charge Pulse Oximetry - Multiple   Pulse 72   Resp 20   Aerosol Therapy   $ Aerosol Therapy Charges PRN treatment not required   Respiratory Treatment Status PRN treatment not required   Pt tolerates NC well. Pt does not want a PRN treatment at this time. Pt does not want to be disturbed tonight since she is leaving for hospice in am.

## 2018-11-14 NOTE — PLAN OF CARE
Problem: Patient Care Overview  Goal: Plan of Care Review  Outcome: Revised  Plans for discharge home with hospice tomorrow.

## 2018-11-14 NOTE — PLAN OF CARE
Problem: Nutrition, Imbalanced: Inadequate Oral Intake (Adult)  Goal: Improved Oral Intake  Patient will demonstrate the desired outcomes by discharge/transition of care.  Intervention: Liberalize diet     Recommendation:   1) Pt going home on hospice, rec. diet per pt preference   2) Continue supplements per pt preference     Goals: 1) Pt will consume >=50% EEN by f/u   Nutrition Goal Status: Not Met (home on hospice)  Communication of RD Recs: (POC, sticky note)

## 2018-11-14 NOTE — PLAN OF CARE
"I updated Rolanda with [facility] [phone] that the pt will need portable home O2 delivered to the hospital prior to discharge because the pt does not qualify for an ambulance and does not want to transport via ambulance. Ms. Orantes stated," well that holds everything up, ok let me get to working on it." . Justine Campbell LMSW     11/14/18 1047   Discharge Assessment   Assessment Type Discharge Planning Reassessment      "

## 2018-11-14 NOTE — DISCHARGE SUMMARY
Discharge Summary  Hospital Medicine    Admit Date: 10/26/2018    Date and Time: 11/14/20187:59 AM    Discharge Attending Physician: Vicki Suarez MD    Primary Care Physician: James Colon MD    Diagnoses:  Active Hospital Problems    Diagnosis  POA    *Pleural effusion, re current, malignant [J90]  Poorly differentiated adenocarcinoma of lung CA  Yes    Atelectasis [J98.11]  Yes    Abnormal CXR [R93.89]  Yes    Moderate malnutrition [E44.0]  Yes    Wheeze [R06.2]  Yes    Anorexia [R63.0]  Yes    SOB (shortness of breath) [R06.02]  Yes    Pneumonia of left lower lobe due to infectious organism [J18.1]  Yes    Hypothyroidism due to Hashimoto's thyroiditis [E03.8, E06.3]  Yes    Essential hypertension [I10]  Yes    Generalized anxiety disorder [F41.1]  Yes    Acute respiratory failure with hypoxia [J96.01]  Yes    Tobacco abuse [Z72.0]  Yes     Discharged Condition: Fair    Hospital Course:   Bertha Kelly is a 67 y/o female with a PMHx of HTN and thyroid disease who presented to the ED with c/o SOB which began 3 weeks ago and has progressively gotten worse.  Pt stated that she became SOB with activity.  She reported that she received a pneumonia vaccine at her doctor's office about a month ago and began having some chest congestion and cough a few days later.  She is a current smoker of about 1/2 pack daily.  Reported increased fatigue and chills, but denied fever, chest pain, N/V, and dysuria.  Reported weight loss of a few pounds in the past 3 weeks due to poor appetite, but stated that prior to this illness, weight was stable and she had a very good appetite.  Pt's CXR reveals a LLL pneumonia and was noted to be mildly hypoxic initially requiring supplemental oxygen.  She will be admitted to the service of hospital medicine for further treatment. Patient was admitted to Hospitalist medicine service. Patient was evaluated by Dr. Abdi. Patient was treated with IV antibiotics, IV Solumedrol therapy  and beta 2 agonist nebulization treatments. Patient underwent thoracentesis confirming malignant effusion and it re-occured next day. Dr. borjas consulted who performed VATS, left pleural biopsy, lung biopsy and left pleurodesis. Patient continue to fail to thrive. Patient and family requested hospice care arrangement for home. Patient too frail and weak to undergo any further aggressive care. Patient was also evaluated by Dr. Knox from oncology as well. Patient was discharged to home with hospice in stable condition with following discharge plan of care. Total time with the patient was 30 minutes and greater than 50% was spent in counseling and coordination of care. The assessment and plan have been discussed at length. Physicians' notes reviewed. Labs and procedure reviewed.     Consults: Dr. Abdi, Dr. Borjas    Significant Diagnostic Studies:   MRI Brain  Impression:       1. There is no intracranial hemorrhage or mass.  There is no pathologic enhancement in the brain.  2. Brain volume is normal.  There is no hydrocephalus or midline shift.  3. There is mild nonspecific and age indeterminate periventricular and subcortical white matter disease.  These findings likely reflect sequelae of chronic small vessel ischemic change.  Additionally, there are few small regions of high signal intensity on the diffusion sequence in the left frontal and parietal white matter.  These regions are also FLAIR and T2 hyperintense which is somewhat nonspecific but may reflect some degree of T2 shine through and/or subacute ischemic change.  There is no large vascular territory infarction.      CXR: The cardiomediastinal silhouette is normal in appearance.  No pulmonary vascular congestion appreciated. There is dense airspace consolidative change present at the left lung base and there is patchy segmental airspace opacity in the left mid and upper lung zones.  Differential considerations include pneumonia and aspiration.   Underlying pleural fluid cannot be excluded.  An underlying mass would be difficult to exclude.  Serial radiography will be necessary to ensure resolution and exclude an underlying lesion.  There is an age-indeterminate, possibly remote, right posterior 10th rib fracture.     CXR:  Unchanged small left apical pneumothorax. Unchanged moderate left and small right pleural effusions and multifocal airspace disease.     CT chest:  Small left pneumothorax status post preceding left thoracentesis.  Less than 10%.  Moderate left and small right pleural effusions and moderate left lung and mild right lower lobe consolidation/atelectasis.  Enlarged prevascular mediastinal lymph node.  Additional nodular opacities at the cardiac apex which may represent additional prominent lymph nodes, or possibly pleural based nodules.  Moderate emphysema.  A distinct lung mass is not visible however follow-up to resolution of the pulmonary consolidation is recommended to evaluate for on the underlying lung mass, considering the enlarged mediastinal lymph node.  Further evaluation with CT chest with contrast may also be useful if the patient can receive IV contrast. 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     CXR: Unchanged small left apical pneumothorax. Unchanged moderate left and small right pleural effusions and multifocal airspace disease.     CXR: Continued dense infiltrate in the left mid and lower lung field with atelectasis and moderate to large left pleural effusion which obscures the heart size.  Small infiltrate at the right lung base and small right pleural effusion as well.     ECHO:  · Left ventricle ejection fraction is at 60%  · No wall motion abnormalities.  · Grade I (mild) left ventricular diastolic dysfunction consistent with impaired relaxation.  · LA pressure is  normal.  · Mitral valve is mildly sclerotic  · There is a moderate left pleural effusion.  · Technically very difficult study.     CXR: Increased opacification left hemithorax suggesting atelectasis.     CT chest abdomen and pelvis with and without contrast:  1. Left hilar masslike opacity concerning for primary neoplasm.  Concern for pleural and mediastinal lymph node metastases.  Local anatomic considerations as described.  2. Small complex left pleural effusion with chest tube in place.  Moderate volume right pleural effusion.  3. Emphysema.  4. Porcelain gallbladder.  Associated increased risk of gallbladder malignancy.     RUQ abdominal US:  Calcified gallbladder wall (porcelain gallbladder), which reportedly has a small increased risk of development of gallbladder cancer. Moderately dilated common bile duct, which is nonspecific.  Correlation with obstructive symptomatology and laboratory values recommended.     CXR: Significant improvement in aeration of the left lung.  Left chest tube in place and small bilateral pleural effusions.     Bronchoscopy: After informed consent and time out and sedation by anesthesia- pt had bronchoscopy.  ebl 0 and no complications.  Copious thick secretions noted in left airways.  Washes done to clear completely.  cutlure submitted.     MRI brain:   1. There is no intracranial hemorrhage or mass.  There is no pathologic enhancement in the brain.  2. Brain volume is normal.  There is no hydrocephalus or midline shift.  3. There is mild nonspecific and age indeterminate periventricular and subcortical white matter disease.  These findings likely reflect sequelae of chronic small vessel ischemic change.  Additionally, there are few small regions of high signal intensity on the diffusion sequence in the left frontal and parietal white matter.  These regions are also FLAIR and T2 hyperintense which is somewhat nonspecific but may reflect some degree of T2 shine through and/or  subacute ischemic change.  There is no large vascular territory infarction.     CXR: Left chest tube in place without residual pneumothorax or pleural effusion.  Continued infiltrate atelectasis of the left lower lobe however.  Right base atelectasis and infiltrate with moderate size right pleural effusion the     CXR: Left chest tube removed without recurrent pneumothorax or pleural effusion seen.  There remains however bilateral lower lobe infiltrates and a moderate right pleural effusion.    Microbiology Results (last 7 days)     Procedure Component Value Units Date/Time    Culture, Respiratory with Gram Stain [[id number]] Collected:  11/08/18 1258    Order Status:  Completed Specimen:  Respiratory from Bronchial Wash Updated:  11/12/18 0742     Respiratory Culture Normal respiratory christianne     Gram Stain (Respiratory) Few WBC's     Gram Stain (Respiratory) Rare Gram positive cocci    Culture, Respiratory with Gram Stain [[id number]] Collected:  11/10/18 0815    Order Status:  Completed Specimen:  Respiratory from Bronchial Wash Updated:  11/12/18 0742     Respiratory Culture Normal respiratory christianne     Gram Stain (Respiratory) >10 epithelial cells per low power field     Gram Stain (Respiratory) Many WBC's     Gram Stain (Respiratory) Few Gram positive cocci        Special Treatments/Procedures: as above  Disposition: Home Hospice    Medications:  Reconciled Home Medications:   Current Discharge Medication List      START taking these medications    Details   albuterol-ipratropium (DUO-NEB) 2.5 mg-0.5 mg/3 mL nebulizer solution Take 3 mLs by nebulization every 6 (six) hours as needed for Wheezing. Rescue  Qty: 120 vial, Refills: 5      furosemide (LASIX) 20 MG tablet Take 1 tablet (20 mg total) by mouth once daily.  Qty: 30 tablet, Refills: 0      metoprolol tartrate (LOPRESSOR) 50 MG tablet Take 1 tablet (50 mg total) by mouth 2 (two) times daily.  Qty: 60 tablet, Refills: 0      nicotine (NICODERM CQ) 14 mg/24  hr Place 1 patch onto the skin once daily.  Refills: 0      oxyCODONE-acetaminophen (PERCOCET) 7.5-325 mg per tablet Take 1 tablet by mouth every 6 (six) hours as needed.  Refills: 0      polyethylene glycol (GLYCOLAX) 17 gram PwPk Take 17 g by mouth 2 (two) times daily.  Qty: 60 each, Refills: 0      predniSONE (DELTASONE) 10 MG tablet Take 1 tablet (10 mg total) by mouth 2 (two) times daily. for 10 days  Qty: 20 tablet, Refills: 0         CONTINUE these medications which have NOT CHANGED    Details   amitriptyline (ELAVIL) 25 MG tablet Take 25 mg by mouth every evening.      escitalopram oxalate (LEXAPRO) 20 MG tablet Take 20 mg by mouth once daily.      levothyroxine (SYNTHROID) 50 MCG tablet Take 50 mcg by mouth before breakfast.      LORazepam (ATIVAN) 1 MG tablet Take 1 mg by mouth every 12 (twelve) hours as needed for Anxiety.         STOP taking these medications       propranolol (INDERAL) 80 MG tablet Comments:   Reason for Stopping:             Discharge Procedure Orders   Diet Cardiac   Order Comments: Boost plus can with meals     Other restrictions (specify):   Order Comments: PLEASE OBSERVE FALL PRECAUTIONS     Call MD for:   Order Comments: For worsening symptoms, chest pain, shortness of breath, increased abdominal pain, high grade fever, stroke or stroke like symptoms, immediately go to the nearest Emergency Room or call 911 as soon as possible.     Follow-up Information     James Colon MD In 1 week.    Specialty:  Family Medicine  Contact information:  [address]  [facility] [id number] [phone]             Liu Abdi MD.    Specialty:  Pulmonary Disease  Why:  As needed  Contact information:  [address]  [address]  [place] [address] [phone]             Marsha Knox MD.    Specialty:  Hematology and Oncology  Why:  As needed  Contact information:  [address] [id number] [phone]             Please follow up.     Contact information:  Continue supplemental oxygen as needed.

## 2018-11-14 NOTE — PROGRESS NOTES
Spoke with Rolanda with [facility]. She states ok to let patient leave and hospice nurse will call later for report. Equipment and medications are set up for patient.

## 2018-11-14 NOTE — PROGRESS NOTES
11/14/18 0718   Patient Assessment/Suction   Level of Consciousness (AVPU) alert   PRE-TX-O2-ETCO2   O2 Device (Oxygen Therapy) nasal cannula   $ Is the patient on Low Flow Oxygen? Yes   Flow (L/min) 3   SpO2 100 %   Pulse Oximetry Type Continuous   $ Pulse Oximetry - Multiple Charge Pulse Oximetry - Multiple   Pulse 71   Resp (!) 25   Aerosol Therapy   $ Aerosol Therapy Charges PRN treatment not required   Respiratory Treatment Status PRN treatment not required

## 2018-11-14 NOTE — PROGRESS NOTES
Progress Note  Cardiothoracic Surgery    Admit Date: 10/26/2018  Post-operative Day: 3 Days Post-Op  Hospital Day: 19    SUBJECTIVE:Comfortable, no specific complaint     Follow-up For: Procedure(s) (LRB):  BRONCHOSCOPY (Left)    Scheduled Meds:   amitriptyline  25 mg Oral QHS    enoxaparin  40 mg Subcutaneous Daily    escitalopram oxalate  20 mg Oral Daily    furosemide  20 mg Oral Daily    levothyroxine  50 mcg Oral Before breakfast    lidocaine (PF) 10 mg/ml (1%)        lidocaine (PF) 10 mg/ml (1%)        lidocaine (PF)        lidocaine-EPINEPHrine (PF) 1%-1:200,000        metoprolol tartrate  50 mg Oral BID    nicotine  1 patch Transdermal Daily    oxymetazoline        oxymetazoline        pantoprazole  40 mg Oral Daily    polyethylene glycol  17 g Oral BID    potassium, sodium phosphates  1 packet Oral QID (AC & HS)    predniSONE  10 mg Oral BID     Infusions/Drips:  PRN Meds: acetaminophen, albuterol-ipratropium, dextrose 50%, dextrose 50%, glucagon (human recombinant), glucose, glucose, LORazepam, ondansetron, oxyCODONE-acetaminophen, sodium chloride 0.9%    Review of patient's allergies indicates:   Allergen Reactions    Codeine Hives    Penicillins Hives       OBJECTIVE:     Vital Signs (Most Recent)  Temp: 98.3 °F (36.8 °C) (11/13/18 1620)  Pulse: 82 (11/13/18 1800)  Resp: (!) 22 (11/13/18 1800)  BP: (!) 143/65 (11/13/18 1800)  SpO2: 97 % (11/13/18 1800)    Admission Weight: 54.4 kg (120 lb) (10/26/18 2105)   Most Recent Weight: 50.4 kg (111 lb 1.8 oz) (11/10/18 0446)    Vital Signs Range (Last 24H):  Temp:  [97.9 °F (36.6 °C)-98.7 °F (37.1 °C)]   Pulse:  [56-82]   Resp:  [15-26]   BP: ()/(51-96)   SpO2:  [93 %-100 %]     I & O (Last 24H):    Intake/Output Summary (Last 24 hours) at 11/13/2018 1839  Last data filed at 11/13/2018 1829  Gross per 24 hour   Intake 3378.33 ml   Output 3550 ml   Net -171.67 ml     Physical Exam:  NAD  BS decreased on left (not unusual secondary to scar,  pleural thickening, numerous implants, chest wall/pulmonary edema    Wound/Incision:  Dressing intact and dry    Laboratory:  CBC:   Recent Labs   Lab 11/13/18 0315   WBC 14.80*   RBC 4.07   HGB 12.0   HCT 36.9*   *   MCV 91   MCH 29.5   MCHC 32.5     BMP:   Recent Labs   Lab 11/13/18 0315         K 4.8   CL 97   CO2 31*   BUN 8   CREATININE 0.6   CALCIUM 8.6*   MG 1.9     Microbiology Results (last 7 days)     Procedure Component Value Units Date/Time    Culture, Respiratory with Gram Stain [[id number]] Collected:  11/08/18 1258    Order Status:  Completed Specimen:  Respiratory from Bronchial Wash Updated:  11/12/18 0742     Respiratory Culture Normal respiratory christianne     Gram Stain (Respiratory) Few WBC's     Gram Stain (Respiratory) Rare Gram positive cocci    Culture, Respiratory with Gram Stain [[id number]] Collected:  11/10/18 0815    Order Status:  Completed Specimen:  Respiratory from Bronchial Wash Updated:  11/12/18 0742     Respiratory Culture Normal respiratory christianne     Gram Stain (Respiratory) >10 epithelial cells per low power field     Gram Stain (Respiratory) Many WBC's     Gram Stain (Respiratory) Few Gram positive cocci        Specimen (12h ago, onward)    None          Diagnostic Results:  X-Ray: Reviewed    ASSESSMENT/PLAN:     Assessment: s/p VATS with pleural/lung biopsy and pleurodesis    Plan: Arrangements being made for Hospice

## 2018-11-14 NOTE — PROGRESS NOTES
Called and spoke with Lavern Gregory NP about the patient going home on hospice tomorrow and currently has am labs ordered and b/p cuff set at q 1 hour. TORB to discontinue b/p except once a shift and to cancel am labs. Will continue to monitor.

## 2018-11-14 NOTE — PROGRESS NOTES
" [facility]  Adult Nutrition  Progress Note    SUMMARY   Intervention: Liberalize diet     Recommendation:   1) Pt going home on hospice, rec. diet per pt preference   2) Continue supplements per pt preference     Goals: 1) Pt will consume >=50% EEN by f/u   Nutrition Goal Status: Not Met (home on hospice)  Communication of RD Recs: (POC, sticky note)     Reason for Assessment     Reason for Assessment: length of stay  Diagnosis: pulmonary disease  Relevant Medical History: HTN, pneumonia  Interdisciplinary Rounds: did not attend     General Information Comments: Admitted with respiratory failure, SOB, coughing x 3 days. Fatigue x 1 week.  Pt just out of recovery, able to speak. Reports poor appetite pta. NFPE reveals moderate fat loss with sunken eyes, cheeks and mild in upper arm. Mild muscle loss noted in clavicle, hands, temples.   11/8/18 Pt diet upgraded from clear-> full-> cardiac 11/5-11/7 pt was tolerating 50% of meals yesterday. NPO today for Bronch. Denies GI symptoms.   11/14/18 Plan for home on hospice today. Pt's appetite remains fair, eating 50-75% of the grain and fruit/vegetable portion of her trays but dislikes the protein. Drinking one boost plus daily. Will adjust supplements per pt preference. Educated pt concerning 5-6 small frequent meals daily and nutrition supplements for home if she prefers as pt c/o early satiety.      Nutrition Discharge Planning: To be determined- Liberalized diet + Boost Plus and Boost pudding per pt preference     Nutrition Risk Screen     Nutrition Risk Screen: no indicators present     Nutrition/Diet History     Patient Reported Diet/Restrictions/Preferences: general  Do you have any cultural, spiritual, Restoration conflicts, given your current situation?: no  Supplemental Drinks or Food Habits: (none)  Food Allergies: NKFA(No intolerances noted. )     Anthropometrics     Temp: 99.8 °F (37.7 °C)  Height Method: Stated  Height: 5' 3"  Height " (inches): 63 in  Weight Method: Bed Scale  Weight: 50.4 kg (11/10, stable)  Weight (lb): 111 lb  Ideal Body Weight (IBW), Female: 115 lb  % Ideal Body Weight, Female (lb): 111.3 lb  BMI (Calculated): 22.7 Admission  Usual Body Weight (UBW), k.64 kg(per chart review 18)  % Usual Body Weight: 99.22  % Weight Change From Usual Weight: -0.99 %     Lab/Procedures/Meds     Pertinent Labs Reviewed: reviewed  BMP  Lab Results   Component Value Date     2018    K 4.8 2018    CL 97 2018    CO2 31 (H) 2018    BUN 8 2018    CREATININE 0.6 2018    CALCIUM 8.6 (L) 2018    ANIONGAP 9 2018    ESTGFRAFRICA >60 2018    EGFRNONAA >60 2018     No results found for: CRP       Pertinent Medications Reviewed       Physical Findings/Assessment     Overall Physical Appearance: advanced age, loss of muscle mass, loss of subcutaneous fat, weak  Oral/Mouth Cavity: tooth/teeth missing  Skin: (Karsten score 16, chest incision and knee abrasion)     Estimated/Assessed Needs  Admission  Weight Used For Calorie Calculations: 58.1 kg (128 lb 1.4 oz)  Energy Calorie Requirements (kcal): 6780-9644 (AF 1.3-1.5)  Energy Need Method: Bucky Guerar  Protein Requirements:  (1.2-2.0 gm/kg/day per critical care guide)  Weight Used For Protein Calculations: 58.1 kg (128 lb 1.4 oz)  Fluid Need Method: (or per MD)  RDA Method (mL): 1605  CHO Requirement: n/a        Nutrition Prescription Ordered     Current Diet Order: Cardiac  Boost Plus with meals     Evaluation of Received Nutrient/Fluid Intake     IV Fluid (mL): 100(mls/hr)  Energy Calories Required: not meeting needs  Protein Required: not meeting needs  Fluid Required: meeting needs  % Intake of Estimated Energy Needs: 60-70%  % Meal Intake: 50%+ Boost daily      Nutrition Risk     Level of Risk/Frequency of Follow-up: (2 x wkly)      Assessment and Plan     Moderate malnutrition    Contributing Nutrition  Diagnosis  Moderate malnutrition in context of acute illness    Related to (etiology):   Decreased oral intake    Signs and Symptoms (as evidenced by):   1) <75% estimated intake >7 days  2) Moderate fat loss with sunken eyes, cheeks and mild in upper arm. Mild muscle loss noted in clavicle, hands, temples.       Interventions/Recommendations (treatment strategy):  1) ADAT to cardiac with ONS 2) monitor weight and intake    Nutrition Diagnosis Status:   Continues              Monitor and Evaluation     Food and Nutrient Intake: energy intake  Food and Nutrient Adminstration: diet order  Physical Activity and Function: nutrition-related ADLs and IADLs  Biochemical Data, Medical Tests and Procedures: inflammatory profile, lytes  Nutrition-Focused Physical Findings: skin, overall appearance      Nutrition Follow-Up     RD Follow-up?: Yes

## 2018-11-14 NOTE — PROGRESS NOTES
Pt given discharge instructions and prescriptions. O2 tank delivered to room. Peripheral IV removed.  at bedside. Pt connected to O2 tank on 3 L per . Transferred to private vehicle via wheelchair and left with  to home.

## 2018-11-14 NOTE — PLAN OF CARE
I added [facility] to the pts AVS. I sent the pts AVS and discharge orders to [facility] via [employer]. I also manually faxed to [phone] per the request of Rolanda.     I completed Dr. Abdi's consult for a home nebulizer due to hospice covering all of the pts medical equipment. Justine Campbell, ANUEL     Per Rolanda [phone]- she is on the way to the hospital now to meet with the pts spouse about a few things. Once equipment and medications are delivered she will let me know that they are ready. Justine Campbell         11/14/18 0905   Post-Acute Status   Post-Acute Authorization Home Health/Hospice   Home Health/Hospice Status Authorization Obtained

## 2018-11-15 NOTE — PLAN OF CARE
11/15/18 0842   Final Note   Assessment Type Final Discharge Note   Anticipated Discharge Disposition HospiceHome

## 2018-12-31 NOTE — OP NOTE
DATE OF PROCEDURE:  11/05/2018.    SURGEON:  Mp Jones M.D.    ASSISTANT:  None.    PREOPERATIVE DIAGNOSIS:  Recurrent pleural effusion.    POSTOPERATIVE DIAGNOSIS:  Recurrent pleural effusion.    TITLE OF OPERATION:  Left video-assisted thoracoscopy with pleural biopsy, lung   biopsy and pleurodesis.    PROCEDURE IN DETAIL:  The patient was taken to the Operating Room, placed in   supine position and after adequate induction of general anesthesia, utilizing a   double lumen endotracheal tube.  The patient was repositioned with the left side   up and prepped and draped in usual sterile fashion.  Small incision was made in   the mid axillary line, taken down to the subcutaneous tissue.  Hemostasis was   maintained with electrocautery.  Lung was deflated using a hemostat over the rib   and entered the pleural space approximately the fifth intercostal space and the   Thoracoport was then inserted followed by the camera.  Multiple implants were   noted on the chest wall particularly laterally the lung and the surface of the   diaphragm.  Biopsies were taken of both the pleura and after making a second   working port in the posterior axillary line noted to place a stapling device, a   biopsy was also taken of the lower lobe in the usual fashion.  Once this was   done, the stapler was removed.  Staple line was inspected and found to have good   hemostasis.  We then proceeded to distribute the talc evenly throughout the   pleural space in the usual fashion and once this was done, Anesthesia was asked   to re-inflate the lung after a 28-Italian chest tube was positioned posteriorly   and brought out through a separate stab incision and secured to the skin using   heavy silk suture.  Once the lung was fully inflated, the scope were removed and   the port sites were closed in the usual fashion using 2-0 Vicryl running suture   in the subcutaneous layer followed by 3-0 Vicryl running subcuticular stitch to    reapproximate the skin edge.  Wound was clean.  Sterile dressing was applied.    Chest tubes were attached to Pleur-evac suction.  The patient tolerated the   procedure well and was transported to the Recovery Room in stable condition.      PAM  dd: 12/31/2018 01:22:18 (CST)  td: 12/31/2018 07:39:36 (CST)  Doc ID   #[id number]  Job ID #[id number]    CC:

## 2019-02-11 NOTE — ANESTHESIA POSTPROCEDURE EVALUATION
"Anesthesia Post Evaluation    Patient: Bertha Kelly    Procedure(s) Performed: Procedure(s) (LRB):  VATS, WITH PLEURA BIOPSY (Left)  BIOPSY, LUNG, THORACOSCOPIC (Left)  VATS, WITH PLEURODESIS (Left)    Final Anesthesia Type: general  Patient location during evaluation: PACU  Patient participation: Yes- Able to Participate  Level of consciousness: awake and alert and oriented  Post-procedure vital signs: reviewed and stable  Pain management: adequate  Airway patency: patent  PONV status at discharge: No PONV  Anesthetic complications: no      Cardiovascular status: blood pressure returned to baseline and stable  Respiratory status: unassisted and spontaneous ventilation  Hydration status: euvolemic  Follow-up not needed.        Visit Vitals  /60   Pulse 82   Temp 36.8 °C (98.2 °F) (Oral)   Resp (!) 23   Ht 5' 3" (1.6 m)   Wt 58.1 kg (128 lb)   LMP  (LMP Unknown)   SpO2 (!) 94%   Breastfeeding? No   BMI 22.67 kg/m²       Pain/Heather Score: Pain Assessment Performed: Yes (11/5/2018 11:50 AM)  Presence of Pain: denies (11/5/2018 12:00 PM)  Pain Rating Prior to Med Admin: 4 (11/5/2018 12:19 PM)  Heather Score: 8 (11/5/2018 12:00 PM)        "
patient

## 2021-02-25 NOTE — NURSING
Patient: Jose E Johnson Date: 2021   : 11/10/1934 Attending: Davonte Betancourt DO   86 year old male        Chief Complaint: Pneumonia    Subjective: Karl is feeling better, cough improved over the past couple of days.  Still feeling weak in the legs.     Pertinent Reviewed: Allergies, Medical History, Surgical History, Social History, Family History and Medications    Vital 24 Hour Range Most Recent Value   Temperature Temp  Min: 98 °F (36.7 °C)  Max: 98.7 °F (37.1 °C) 98.7 °F (37.1 °C) (21 0850)   Pulse Pulse  Min: 69  Max: 98 98 (21 0850)   Respiratory Resp  Min: 16  Max: 22 (!) 22 (21 0850)   Non-Invasive  Blood Pressure BP  Min: 120/62  Max: 156/86 124/74 (21 0850)   Pulse Oximetry SpO2  Min: 93 %  Max: 94 % 93 % (21 0850)   Arterial  Blood Pressure No data recorded     O2 No data recorded       Vital Most Recent Value First Value   Weight 90.8 kg (21 0100) Weight: 96.2 kg (21 1209)   Height 6' (182.9 cm) (21 1450) Height: 6' (182.9 cm) (21 1209)   BMI 27.15 (21 0100) N/A     Weight over the past 48 Hours:   Patient Vitals for the past 48 hrs:   Weight   21 0600 89.4 kg   21 0100 90.8 kg       Medications/Infusions:  Scheduled:   • iron sucrose (VENOFER) IVPB  200 mg Intravenous Daily   • folic acid  1 mg Oral Daily   • cyanocobalamin  1,000 mcg Intramuscular Daily   • mupirocin  1 application Topical Daily   • docusate sodium  100 mg Oral Daily   • enoxaparin  40 mg Subcutaneous Q Evening   • aspirin  81 mg Oral Daily   • atorvastatin  20 mg Oral Daily   • clopidogrel  75 mg Oral Daily   • finasteride  5 mg Oral Daily   • furosemide  20 mg Oral Daily   • gabapentin  600 mg Oral Nightly   • metoPROLOL tartrate  50 mg Oral 2 times per day   • pantoprazole  40 mg Oral QAM AC   • Potassium Standard Replacement Protocol   Does not apply See Admin Instructions   • Magnesium Standard Replacement Protocol   Does not apply See Admin  Dr Abdi is at bedside. Notified MD of cxr results this am. MD scheduled bronch for 1200 today. Patient is sitting up in bed watching TV. NADN. Spo2 95% on 3.5L per NC. VSS. Confusion remains but is calm and cooperative. Alert to self and situation. Easily reoriented. CT to L lateral chest to -20 suction. Minimal serosang drainage noted. Site is intact. Call light is with in reach. Will monitor.     Instructions       Last Stool Occurrence: 1 (02/23/21 1215)    Intake/Output:      Intake/Output Summary (Last 24 hours) at 2/25/2021 1142  Last data filed at 2/25/2021 0000  Gross per 24 hour   Intake --   Output 1450 ml   Net -1450 ml     Review of Systems:    Pertinent items are noted in HPI (history of present illness).    Physical Exam:   General appearance:  alert, no acute distress, well-developed and well-nourished, Head:  atraumatic, normocephalic and without obvious abnormality, Eyes:   extraocular movements intact, pupils equal, round, reactive to light and accomodation and sclera white and nonicteric, Lungs:  rales at the left base, Heart:  no click, no gallop, no heaves, no murmur, no rub, no thrills, regular rate and rhythm, S1 normal and S2 normal, Abdomen:  bowel sounds normal, no hernias, no masses, no organomegaly, not tender and soft and Extremities:  no clubbing, no cyanosis and no edema    Laboratory Results:    Lab Results   Component Value Date    SODIUM 134 (L) 02/25/2021    POTASSIUM 3.9 02/25/2021    BUN 16 02/25/2021    CREATININE 0.81 02/25/2021    WBC 12.4 (H) 02/25/2021    HCT 37.6 (L) 02/25/2021    HGB 13.1 02/25/2021     02/25/2021    INR 1.1 02/16/2018    RAPDTR 0.10 (HH) 02/17/2018    GLUCOSE 139 (H) 02/25/2021    TSH 1.474 03/03/2020     (H) 02/16/2018    CHOLESTEROL 144 07/25/2019    HDL 49 07/25/2019    CALCLDL 55 07/25/2019    TRIGLYCERIDE 198 (H) 07/25/2019    MG 1.8 02/24/2021     ECHO reviewed    Diagnosis/Comorbidities/Complications:  LLL Pneumonia  Moderate pulmonary HTN  Moderate aortic stenosis  Leukocytosis, up slightly  Elevated BNP  Acute kidney injury, resolved  Chronic back pain with Lumbar stenosis, MRI done, shows Lumbar stenosis  Fall at home  Paroxysmal Afib, no anticoag with h/o falls  Diabetes Mellitus with  PAD  Iron deficiency  Hypertension      Plans/Recommendations:  Diet per speech  Pulmonary and ID following, completed abx  GI on the case,  pt refusing endoscopy, monitor H/H  PT/OT  SW for DC planning, may need DANISH at time of DC in a few days  CBC BMP in the am    Quality:    VTE Pharmacologic Prophylaxis: Yes  VTE Mechanical Prophylaxis: Yes    Discussed with or notes reviewed:  RN and Patient, Tamar NGUYEN

## 2025-04-10 NOTE — PROGRESS NOTES
Problem: Pain - Adult  Goal: Verbalizes/displays adequate comfort level or baseline comfort level  Outcome: Progressing     Problem: Safety - Adult  Goal: Free from fall injury  Outcome: Progressing     Problem: Discharge Planning  Goal: Discharge to home or other facility with appropriate resources  Outcome: Progressing     Problem: Chronic Conditions and Co-morbidities  Goal: Patient's chronic conditions and co-morbidity symptoms are monitored and maintained or improved  Outcome: Progressing     Problem: Nutrition  Goal: Nutrient intake appropriate for maintaining nutritional needs  Outcome: Progressing     Problem: Pain  Goal: Takes deep breaths with improved pain control throughout the shift  Outcome: Progressing  Goal: Turns in bed with improved pain control throughout the shift  Outcome: Progressing  Goal: Walks with improved pain control throughout the shift  Outcome: Progressing  Goal: Performs ADL's with improved pain control throughout shift  Outcome: Progressing  Goal: Participates in PT with improved pain control throughout the shift  Outcome: Progressing  Goal: Free from opioid side effects throughout the shift  Outcome: Progressing  Goal: Free from acute confusion related to pain meds throughout the shift  Outcome: Progressing     Problem: Fall/Injury  Goal: Not fall by end of shift  Outcome: Progressing  Goal: Be free from injury by end of the shift  Outcome: Progressing  Goal: Verbalize understanding of personal risk factors for fall in the hospital  Outcome: Progressing  Goal: Verbalize understanding of risk factor reduction measures to prevent injury from fall in the home  Outcome: Progressing  Goal: Use assistive devices by end of the shift  Outcome: Progressing  Goal: Pace activities to prevent fatigue by end of the shift  Outcome: Progressing   The patient's goals for the shift include      The clinical goals for the shift include safety, pain control    Over the shift, the patient did not  Progress Note  Hospital Medicine  Patient Name:Bertha Kelly  MRN:  [id number]  Patient Class: IP- Inpatient  Admit Date: 10/26/2018  Length of Stay: 12 days  Expected Discharge Date:   Attending Physician: Vicki Suarez MD  Primary Care Provider:  James Colon MD    SUBJECTIVE:     Principal Problem: Pleural effusion  Initial history of present illness: Bertha Kelly is a 69 y/o female with a PMHx of HTN and thyroid disease who presented to the ED today with c/o SOB which began 3 weeks ago and has progressively gotten worse.  Pt states that she becomes SOB with activity.  She reports that she received a pneumonia vaccine at her doctor's office about a month ago and began having some chest congestion and cough a few days later.  She is a current smoker of about 1/2 pack daily.  Reports increased fatigue and chills, but denies fever, chest pain, N/V, and dysuria.  Reports weight loss of a few pounds in the past 3 weeks due to poor appetite, but states that prior to this illness, weight was stable and she had a very good appetite.  Pt's CXR reveals a LLL pneumonia and was noted to be mildly hypoxic initially requiring supplemental oxygen.  She will be admitted to the service of hospital medicine for further treatment.    PMH/PSH/SH/FH/Meds: reviewed.    Symptoms/Review of Systems: In ICU s/p VATS, left pleural biopsy, lung biopsy and left pleurodesis. On 2 L/min via NC. CXR showing worsening, bronchoscopy is planned.  Diet: Cardiac  Activity level: Up with assistance  Pain:  Patient reports no pain.       OBJECTIVE:   Vital Signs (Most Recent):      Temp: 99.1 °F (37.3 °C) (11/08/18 0800)  Pulse: 109 (11/08/18 0828)  Resp: 20 (11/08/18 0800)  BP: (!) 119/57 (11/08/18 0828)  SpO2: (!) 93 % (11/08/18 0800)       Vital Signs Range (Last 24H):  Temp:  [97.7 °F (36.5 °C)-99.1 °F (37.3 °C)]   Pulse:  []   Resp:  [12-33]   BP: ()/(49-81)   SpO2:  [89 %-100 %]     Weight: 50.2 kg (110 lb 10.7 oz)  Body mass  index is 19.6 kg/m².    Intake/Output Summary (Last 24 hours) at 11/8/2018 0926  Last data filed at 11/8/2018 0800  Gross per 24 hour   Intake 3717.67 ml   Output 2985 ml   Net 732.67 ml     Physical Examination:  Constitutional: She is oriented to person, place, and time. She appears well-developed and well-nourished. No distress.   HENT:   Head: Normocephalic and atraumatic.   Eyes: Pupils are equal, round, and reactive to light.   Neck: Neck supple. No thyromegaly present.   Cardiovascular: Normal rate and regular rhythm. Exam reveals no gallop and no friction rub.   No murmur heard.  Pulmonary/Chest: No respiratory distress. She has wheezes. Left chest tube is in place  Abdominal: Soft. Bowel sounds are normal. She exhibits no distension. There is no tenderness. There is no guarding.   Musculoskeletal: Normal range of motion. She exhibits no edema.   Neurological: She is alert and oriented to person, place, and time.   Skin: Skin is warm and dry. No erythema.   Psychiatric: She has a normal mood and affect.     CBC:  Recent Labs   Lab 11/06/18 0311 11/07/18 0316 11/08/18  0401   WBC 19.10* 13.20* 9.20   RBC 4.11 3.63* 3.81*   HGB 12.1 10.9* 11.4*   HCT 36.6* 32.7* 34.5*    275 309   MCV 89 90 90   MCH 29.5 30.0 30.0   MCHC 33.1 33.3 33.2   BMP  Recent Labs   Lab 11/06/18 0311 11/07/18 0316 11/08/18  0401   * 86 87   * 134* 134*   K 4.4 4.1 3.8   CL 93* 95 92*   CO2 30* 32* 32*   BUN 11 10 6*   CREATININE 0.7 0.7 0.6   CALCIUM 9.1 8.6* 8.7   MG 1.8 1.7 1.6      Diagnostic Results:  Microbiology Results (last 7 days)     Procedure Component Value Units Date/Time    Culture, Body Fluid (Aerobic) w/ GS [[id number]] Collected:  10/29/18 1815    Order Status:  Completed Specimen:  Body Fluid from Pleural Fluid Updated:  11/03/18 1039     AEROBIC CULTURE - FLUID No growth     Gram Stain Result Cytospin indicates:      Many WBC's      No organisms seen    Culture, Respiratory with Gram Stain  make progress toward the following goals.      [[id number]]  (Susceptibility) Collected:  10/29/18 1401    Order Status:  Completed Specimen:  Respiratory from Sputum, Expectorated Updated:  11/01/18 1313     Respiratory Culture --     CANDIDA ALBICANS  Moderate       Respiratory Culture --     STENOTROPHOMONAS (X.) MALTOPHILIA  Rare       Gram Stain (Respiratory) <10 epithelial cells per low power field.     Gram Stain (Respiratory) Few WBC's     Gram Stain (Respiratory) Moderate yeast     Gram Stain (Respiratory) Rare Gram positive rods    Blood culture x two cultures. Draw prior to antibiotics. [[id number]] Collected:  10/26/18 2348    Order Status:  Completed Specimen:  Blood from Peripheral, Left  Arm Updated:  11/01/18 1212     Blood Culture, Routine No growth after 5 days.    Narrative:       Aerobic and anaerobic    Blood culture x two cultures. Draw prior to antibiotics. [[id number]] Collected:  10/26/18 2348    Order Status:  Completed Specimen:  Blood from Peripheral, Right  Wrist Updated:  11/01/18 1212     Blood Culture, Routine No growth after 5 days.    Narrative:       Aerobic and anaerobic         CXR: The cardiomediastinal silhouette is normal in appearance.  No pulmonary vascular congestion appreciated. There is dense airspace consolidative change present at the left lung base and there is patchy segmental airspace opacity in the left mid and upper lung zones.  Differential considerations include pneumonia and aspiration.  Underlying pleural fluid cannot be excluded.  An underlying mass would be difficult to exclude.  Serial radiography will be necessary to ensure resolution and exclude an underlying lesion.  There is an age-indeterminate, possibly remote, right posterior 10th rib fracture.    CXR:  Unchanged small left apical pneumothorax. Unchanged moderate left and small right pleural effusions and multifocal airspace disease.    CT chest:  Small left pneumothorax status post preceding left thoracentesis.  Less than 10%.  Moderate left and  small right pleural effusions and moderate left lung and mild right lower lobe consolidation/atelectasis.  Enlarged prevascular mediastinal lymph node.  Additional nodular opacities at the cardiac apex which may represent additional prominent lymph nodes, or possibly pleural based nodules.  Moderate emphysema.  A distinct lung mass is not visible however follow-up to resolution of the pulmonary consolidation is recommended to evaluate for on the underlying lung mass, considering the enlarged mediastinal lymph node.  Further evaluation with CT chest with contrast may also be useful if the patient can receive IV contrast. 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    CXR: Unchanged small left apical pneumothorax. Unchanged moderate left and small right pleural effusions and multifocal airspace disease.    CXR: Continued dense infiltrate in the left mid and lower lung field with atelectasis and moderate to large left pleural effusion which obscures the heart size.  Small infiltrate at the right lung base and small right pleural effusion as well.    ECHO:  · Left ventricle ejection fraction is at 60%  · No wall motion abnormalities.  · Grade I (mild) left ventricular diastolic dysfunction consistent with impaired relaxation.  · LA pressure is normal.  · Mitral valve is mildly sclerotic  · There is a moderate left pleural effusion.  · Technically very difficult study.    CXR: Increased opacification left hemithorax suggesting atelectasis.    CT chest abdomen and pelvis with and without contrast:  1. Left hilar masslike opacity concerning for primary neoplasm.  Concern for pleural and mediastinal lymph node metastases.  Local anatomic considerations as described.  2. Small complex left pleural effusion with chest tube in place.  Moderate volume right pleural effusion.  3.  Emphysema.  4. Porcelain gallbladder.  Associated increased risk of gallbladder malignancy.    Assessment/Plan:     * Acute respiratory failure with hypoxia  Large left para-pneumonic effusion s/p thoracentesis  Lung Adenocarcinoma s/p VATS, left pleural biopsy, lung biopsy and left pleurodesis     Close monitoring in ICU. Follow pulmonary and CTS recommendations.   Continue supplemental oxygen as needed.  Nebulizer treatments   Monitor and treat as clinically indicated.  Follow CT chest abdomen and pelvis results. MRI brain tomorrow. Dr. Knox is following.       Tobacco abuse     Smoking cessation counseling performed. Dangers of cigarette smoking were reviewed with patient in detail and patient was encouraged to quit.      Generalized anxiety disorder     Chronic; currently controlled.  Continue home regimen.      Essential hypertension     Chronic; stable.  Continue chronic meds.      Hypothyroidism due to Hashimoto's thyroiditis     Chronic; pt states controlled.  Continue levothyroxine.      Pneumonia of left lower lobe due to infectious organism     Presents with SOB; LLL pneumonia identified on CXR.    Initiate treatment regimen for CAP, which includes a beta lactam and macrolide.   IV Rocephin and IV azithromycin.  Nebulizer treatments     Worsening Liver enzymes  DC IV Rocephin. Trend LFTs. Follow hepatitis panel and RUQ abdominal US.    Discussed with patient's sister.    DVT prophylaxis: Lovenox 40 mg Sq q day.    Vicki Suarez MD  Department of Hospital Medicine   [facility]
